# Patient Record
Sex: FEMALE | Race: WHITE | NOT HISPANIC OR LATINO | Employment: OTHER | URBAN - METROPOLITAN AREA
[De-identification: names, ages, dates, MRNs, and addresses within clinical notes are randomized per-mention and may not be internally consistent; named-entity substitution may affect disease eponyms.]

---

## 2017-01-03 ENCOUNTER — APPOINTMENT (OUTPATIENT)
Dept: RADIATION ONCOLOGY | Facility: CLINIC | Age: 67
End: 2017-01-03
Attending: RADIOLOGY
Payer: COMMERCIAL

## 2017-01-03 PROCEDURE — 77386 HB NTSTY MODUL RAD TX DLVR CPLX: CPT | Performed by: RADIOLOGY

## 2017-01-03 RX ORDER — SODIUM CHLORIDE 9 MG/ML
20 INJECTION, SOLUTION INTRAVENOUS CONTINUOUS
Status: DISCONTINUED | OUTPATIENT
Start: 2017-01-04 | End: 2017-01-07 | Stop reason: HOSPADM

## 2017-01-04 ENCOUNTER — HOSPITAL ENCOUNTER (OUTPATIENT)
Dept: INFUSION CENTER | Facility: CLINIC | Age: 67
Discharge: HOME | End: 2017-01-04
Payer: COMMERCIAL

## 2017-01-04 VITALS
HEART RATE: 84 BPM | HEIGHT: 66 IN | TEMPERATURE: 98.5 F | DIASTOLIC BLOOD PRESSURE: 69 MMHG | SYSTOLIC BLOOD PRESSURE: 146 MMHG | BODY MASS INDEX: 24.25 KG/M2 | RESPIRATION RATE: 20 BRPM | WEIGHT: 150.91 LBS | OXYGEN SATURATION: 99 %

## 2017-01-04 LAB
ALBUMIN SERPL BCP-MCNC: 3.8 G/DL (ref 3.5–5)
ALP SERPL-CCNC: 63 U/L (ref 46–116)
ALT SERPL W P-5'-P-CCNC: <6 U/L (ref 12–78)
ANION GAP SERPL CALCULATED.3IONS-SCNC: 8 MMOL/L (ref 4–13)
ANISOCYTOSIS BLD QL SMEAR: PRESENT
AST SERPL W P-5'-P-CCNC: <5 U/L (ref 5–45)
BASOPHILS # BLD AUTO: 0.05 THOUSAND/UL (ref 0–0.1)
BASOPHILS NFR MAR MANUAL: 1 % (ref 0–1)
BILIRUB SERPL-MCNC: 0.6 MG/DL (ref 0.2–1)
BUN SERPL-MCNC: 12 MG/DL (ref 5–25)
CALCIUM SERPL-MCNC: 9.9 MG/DL (ref 8.3–10.1)
CHLORIDE SERPL-SCNC: 106 MMOL/L (ref 100–108)
CO2 SERPL-SCNC: 25 MMOL/L (ref 21–32)
CREAT SERPL-MCNC: 1.17 MG/DL (ref 0.6–1.3)
DACRYOCYTES BLD QL SMEAR: PRESENT
EOSINOPHIL # BLD AUTO: 0.18 THOUSAND/UL (ref 0–0.61)
EOSINOPHIL NFR BLD MANUAL: 4 % (ref 0–6)
ERYTHROCYTE [DISTWIDTH] IN BLOOD BY AUTOMATED COUNT: 17.6 % (ref 11.6–15.1)
GFR SERPL CREATININE-BSD FRML MDRD: 46.6 ML/MIN/1.73SQ M
GLUCOSE SERPL-MCNC: 106 MG/DL (ref 65–140)
HCT VFR BLD AUTO: 30.4 % (ref 34.8–46.1)
HGB BLD-MCNC: 10 G/DL (ref 11.5–15.4)
LYMPHOCYTES # BLD AUTO: 0.32 THOUSAND/UL (ref 0.6–4.47)
LYMPHOCYTES # BLD AUTO: 7 % (ref 14–44)
MCH RBC QN AUTO: 26.9 PG (ref 26.8–34.3)
MCHC RBC AUTO-ENTMCNC: 33 G/DL (ref 31.4–37.4)
MCV RBC AUTO: 82 FL (ref 82–98)
MONOCYTES # BLD AUTO: 0.09 THOUSAND/UL (ref 0–1.22)
MONOCYTES NFR BLD AUTO: 2 % (ref 4–12)
NEUTS BAND NFR BLD MANUAL: 8 % (ref 0–8)
NEUTS SEG # BLD: 3.96 THOUSAND/UL (ref 1.81–6.82)
NEUTS SEG NFR BLD AUTO: 78 % (ref 43–75)
OVALOCYTES BLD QL SMEAR: PRESENT
PLATELET # BLD AUTO: 151 THOUSANDS/UL (ref 149–390)
PLATELET BLD QL SMEAR: ADEQUATE
PMV BLD AUTO: 6.6 FL (ref 8.9–12.7)
POTASSIUM SERPL-SCNC: 4.4 MMOL/L (ref 3.5–5.3)
PROT SERPL-MCNC: 7.8 G/DL (ref 6.4–8.2)
RBC # BLD AUTO: 3.72 MILLION/UL (ref 3.81–5.12)
SODIUM SERPL-SCNC: 139 MMOL/L (ref 136–145)
TOTAL CELLS COUNTED SPEC: 100
WBC # BLD AUTO: 4.6 THOUSAND/UL (ref 4.31–10.16)
WBC NRBC COR # BLD: 4.6 THOUSAND/UL (ref 4.31–10.16)

## 2017-01-04 PROCEDURE — 77336 RADIATION PHYSICS CONSULT: CPT | Performed by: RADIOLOGY

## 2017-01-04 PROCEDURE — 96361 HYDRATE IV INFUSION ADD-ON: CPT

## 2017-01-04 PROCEDURE — 77386 HB NTSTY MODUL RAD TX DLVR CPLX: CPT | Performed by: RADIOLOGY

## 2017-01-04 PROCEDURE — 96413 CHEMO IV INFUSION 1 HR: CPT

## 2017-01-04 PROCEDURE — 96375 TX/PRO/DX INJ NEW DRUG ADDON: CPT

## 2017-01-04 PROCEDURE — 80053 COMPREHEN METABOLIC PANEL: CPT | Performed by: INTERNAL MEDICINE

## 2017-01-04 PROCEDURE — 85007 BL SMEAR W/DIFF WBC COUNT: CPT | Performed by: INTERNAL MEDICINE

## 2017-01-04 PROCEDURE — 96367 TX/PROPH/DG ADDL SEQ IV INF: CPT

## 2017-01-04 PROCEDURE — 96417 CHEMO IV INFUS EACH ADDL SEQ: CPT

## 2017-01-04 PROCEDURE — 85027 COMPLETE CBC AUTOMATED: CPT | Performed by: INTERNAL MEDICINE

## 2017-01-04 RX ORDER — DIPHENHYDRAMINE HYDROCHLORIDE 50 MG/ML
25 INJECTION INTRAMUSCULAR; INTRAVENOUS ONCE
Status: COMPLETED | OUTPATIENT
Start: 2017-01-04 | End: 2017-01-04

## 2017-01-04 RX ORDER — DIPHENHYDRAMINE HYDROCHLORIDE 50 MG/ML
25 INJECTION INTRAMUSCULAR; INTRAVENOUS EVERY 6 HOURS PRN
Status: DISCONTINUED | OUTPATIENT
Start: 2017-01-04 | End: 2017-01-07 | Stop reason: HOSPADM

## 2017-01-04 RX ADMIN — ONDANSETRON HYDROCHLORIDE: 2 INJECTION, SOLUTION INTRAMUSCULAR; INTRAVENOUS at 09:12

## 2017-01-04 RX ADMIN — HYDROCORTISONE SODIUM SUCCINATE 50 MG: 100 INJECTION, POWDER, FOR SOLUTION INTRAMUSCULAR; INTRAVENOUS at 09:50

## 2017-01-04 RX ADMIN — DIPHENHYDRAMINE HYDROCHLORIDE 25 MG: 50 INJECTION, SOLUTION INTRAMUSCULAR; INTRAVENOUS at 09:48

## 2017-01-04 RX ADMIN — SODIUM CHLORIDE 500 ML: 0.9 INJECTION, SOLUTION INTRAVENOUS at 10:05

## 2017-01-04 RX ADMIN — DOCETAXEL 45 MG: 20 INJECTION, SOLUTION, CONCENTRATE INTRAVENOUS at 09:39

## 2017-01-04 RX ADMIN — CARBOPLATIN 117 MG: 10 INJECTION, SOLUTION INTRAVENOUS at 12:46

## 2017-01-04 RX ADMIN — RANITIDINE HYDROCHLORIDE 50 MG: 25 INJECTION, SOLUTION INTRAMUSCULAR; INTRAVENOUS at 09:55

## 2017-01-04 RX ADMIN — SODIUM CHLORIDE 20 ML/HR: 0.9 INJECTION, SOLUTION INTRAVENOUS at 09:12

## 2017-01-04 NOTE — PROGRESS NOTES
Eight minutes into the patient's taxotere infusion she  Began to cough  She c/o a tickle in her throat, chest tightness and sudden back pain  The chemotherapy reaction protocol was followed  Patient's symptoms resolved quickly  Within 15 minutes she denied back pain and chest pain  Dr Hayden Baumann was made aware  Orders were sent to re-challenge the taxotere

## 2017-01-04 NOTE — PLAN OF CARE
Problem: SAFETY ADULT  Goal: Patient will remain free of falls  INTERVENTIONS:  - Assess patient frequently for physical needs  - Identify cognitive and physical deficits and behaviors that affect risk of falls    - [place] fall precautions as indicated by assessment   - Educate patient/family on patient safety including physical limitations  - Instruct patient to call for assistance with activity based on assessment  - Modify environment to reduce risk of injury  - Consider OT/PT consult to assist with strengthening/mobility  Outcome: Progressing

## 2017-01-05 PROCEDURE — 77417 THER RADIOLOGY PORT IMAGE(S): CPT | Performed by: RADIOLOGY

## 2017-01-05 PROCEDURE — 77386 HB NTSTY MODUL RAD TX DLVR CPLX: CPT | Performed by: RADIOLOGY

## 2017-01-06 PROCEDURE — 77386 HB NTSTY MODUL RAD TX DLVR CPLX: CPT | Performed by: RADIOLOGY

## 2017-01-09 ENCOUNTER — APPOINTMENT (OUTPATIENT)
Dept: LAB | Facility: CLINIC | Age: 67
End: 2017-01-09
Payer: COMMERCIAL

## 2017-01-09 DIAGNOSIS — C34.90 MALIGNANT NEOPLASM OF UNSPECIFIED PART OF UNSPECIFIED BRONCHUS OR LUNG (HCC): ICD-10-CM

## 2017-01-09 LAB
ALBUMIN SERPL BCP-MCNC: 3.6 G/DL (ref 3.5–5)
ALP SERPL-CCNC: 61 U/L (ref 46–116)
ALT SERPL W P-5'-P-CCNC: <6 U/L (ref 12–78)
ANION GAP SERPL CALCULATED.3IONS-SCNC: 8 MMOL/L (ref 4–13)
ANISOCYTOSIS BLD QL SMEAR: PRESENT
AST SERPL W P-5'-P-CCNC: <5 U/L (ref 5–45)
BASOPHILS # BLD AUTO: 0 THOUSAND/UL (ref 0–0.1)
BASOPHILS NFR MAR MANUAL: 0 % (ref 0–1)
BILIRUB SERPL-MCNC: 0.5 MG/DL (ref 0.2–1)
BUN SERPL-MCNC: 13 MG/DL (ref 5–25)
CALCIUM SERPL-MCNC: 9.8 MG/DL (ref 8.3–10.1)
CHLORIDE SERPL-SCNC: 107 MMOL/L (ref 100–108)
CO2 SERPL-SCNC: 28 MMOL/L (ref 21–32)
CREAT SERPL-MCNC: 1.08 MG/DL (ref 0.6–1.3)
EOSINOPHIL # BLD AUTO: 0.18 THOUSAND/UL (ref 0–0.61)
EOSINOPHIL NFR BLD MANUAL: 3 % (ref 0–6)
ERYTHROCYTE [DISTWIDTH] IN BLOOD BY AUTOMATED COUNT: 18.4 % (ref 11.6–15.1)
GFR SERPL CREATININE-BSD FRML MDRD: 51.1 ML/MIN/1.73SQ M
GLUCOSE SERPL-MCNC: 115 MG/DL (ref 65–140)
HCT VFR BLD AUTO: 30.7 % (ref 34.8–46.1)
HGB BLD-MCNC: 10 G/DL (ref 11.5–15.4)
LG PLATELETS BLD QL SMEAR: PRESENT
LYMPHOCYTES # BLD AUTO: 0.41 THOUSAND/UL (ref 0.6–4.47)
LYMPHOCYTES # BLD AUTO: 7 % (ref 14–44)
MCH RBC QN AUTO: 26.7 PG (ref 26.8–34.3)
MCHC RBC AUTO-ENTMCNC: 32.6 G/DL (ref 31.4–37.4)
MCV RBC AUTO: 82 FL (ref 82–98)
MONOCYTES # BLD AUTO: 0.18 THOUSAND/UL (ref 0–1.22)
MONOCYTES NFR BLD AUTO: 3 % (ref 4–12)
NEUTS BAND NFR BLD MANUAL: 6 % (ref 0–8)
NEUTS SEG # BLD: 5.13 THOUSAND/UL (ref 1.81–6.82)
NEUTS SEG NFR BLD AUTO: 81 % (ref 43–75)
OVALOCYTES BLD QL SMEAR: PRESENT
PLATELET # BLD AUTO: 147 THOUSANDS/UL (ref 149–390)
PLATELET BLD QL SMEAR: ABNORMAL
PMV BLD AUTO: 6.5 FL (ref 8.9–12.7)
POTASSIUM SERPL-SCNC: 4.4 MMOL/L (ref 3.5–5.3)
PROT SERPL-MCNC: 7.5 G/DL (ref 6.4–8.2)
RBC # BLD AUTO: 3.74 MILLION/UL (ref 3.81–5.12)
SODIUM SERPL-SCNC: 143 MMOL/L (ref 136–145)
TOTAL CELLS COUNTED SPEC: 100
WBC # BLD AUTO: 5.9 THOUSAND/UL (ref 4.31–10.16)
WBC NRBC COR # BLD: 5.9 THOUSAND/UL (ref 4.31–10.16)

## 2017-01-09 PROCEDURE — 77386 HB NTSTY MODUL RAD TX DLVR CPLX: CPT | Performed by: RADIOLOGY

## 2017-01-09 PROCEDURE — 36415 COLL VENOUS BLD VENIPUNCTURE: CPT

## 2017-01-09 PROCEDURE — 85007 BL SMEAR W/DIFF WBC COUNT: CPT

## 2017-01-09 PROCEDURE — 80053 COMPREHEN METABOLIC PANEL: CPT

## 2017-01-09 PROCEDURE — 85027 COMPLETE CBC AUTOMATED: CPT

## 2017-01-10 ENCOUNTER — ALLSCRIPTS OFFICE VISIT (OUTPATIENT)
Dept: OTHER | Facility: OTHER | Age: 67
End: 2017-01-10

## 2017-01-10 PROCEDURE — 77386 HB NTSTY MODUL RAD TX DLVR CPLX: CPT | Performed by: RADIOLOGY

## 2017-01-10 RX ORDER — SODIUM CHLORIDE 9 MG/ML
20 INJECTION, SOLUTION INTRAVENOUS CONTINUOUS
Status: DISCONTINUED | OUTPATIENT
Start: 2017-01-11 | End: 2017-01-14 | Stop reason: HOSPADM

## 2017-01-11 ENCOUNTER — HOSPITAL ENCOUNTER (OUTPATIENT)
Dept: INFUSION CENTER | Facility: CLINIC | Age: 67
Discharge: HOME | End: 2017-01-11
Payer: COMMERCIAL

## 2017-01-11 VITALS
TEMPERATURE: 97.3 F | DIASTOLIC BLOOD PRESSURE: 61 MMHG | WEIGHT: 149.91 LBS | HEIGHT: 66 IN | HEART RATE: 87 BPM | BODY MASS INDEX: 24.09 KG/M2 | SYSTOLIC BLOOD PRESSURE: 126 MMHG | RESPIRATION RATE: 16 BRPM

## 2017-01-11 PROCEDURE — 96413 CHEMO IV INFUSION 1 HR: CPT

## 2017-01-11 PROCEDURE — 96367 TX/PROPH/DG ADDL SEQ IV INF: CPT

## 2017-01-11 PROCEDURE — 96375 TX/PRO/DX INJ NEW DRUG ADDON: CPT

## 2017-01-11 PROCEDURE — 96417 CHEMO IV INFUS EACH ADDL SEQ: CPT

## 2017-01-11 PROCEDURE — 77386 HB NTSTY MODUL RAD TX DLVR CPLX: CPT | Performed by: RADIOLOGY

## 2017-01-11 PROCEDURE — 77336 RADIATION PHYSICS CONSULT: CPT | Performed by: RADIOLOGY

## 2017-01-11 RX ADMIN — DIPHENHYDRAMINE HYDROCHLORIDE 25 MG: 50 INJECTION, SOLUTION INTRAMUSCULAR; INTRAVENOUS at 09:06

## 2017-01-11 RX ADMIN — CARBOPLATIN 117 MG: 10 INJECTION, SOLUTION INTRAVENOUS at 11:23

## 2017-01-11 RX ADMIN — HYDROCORTISONE SODIUM SUCCINATE 100 MG: 100 INJECTION, POWDER, FOR SOLUTION INTRAMUSCULAR; INTRAVENOUS at 09:03

## 2017-01-11 RX ADMIN — SODIUM CHLORIDE 20 ML/HR: 0.9 INJECTION, SOLUTION INTRAVENOUS at 08:45

## 2017-01-11 RX ADMIN — DOCETAXEL 45 MG: 20 INJECTION, SOLUTION, CONCENTRATE INTRAVENOUS at 09:53

## 2017-01-11 RX ADMIN — ONDANSETRON HYDROCHLORIDE: 2 INJECTION, SOLUTION INTRAMUSCULAR; INTRAVENOUS at 08:46

## 2017-01-11 NOTE — PLAN OF CARE
Problem: Knowledge Deficit  Goal: Patient/family/caregiver demonstrates understanding of disease process, treatment plan, medications, and discharge instructions  Complete learning assessment and assess knowledge base    Interventions:  - Provide teaching at level of understanding  - Provide teaching via preferred learning methods   Outcome: Progressing

## 2017-01-11 NOTE — PROGRESS NOTES
Pt tolerated Taxotere and Carboplatin infusions today without any adverse reaction  Taxotere was administered using titration guidelines for Taxane infusions

## 2017-01-12 PROCEDURE — 77417 THER RADIOLOGY PORT IMAGE(S): CPT | Performed by: RADIOLOGY

## 2017-01-12 PROCEDURE — 77386 HB NTSTY MODUL RAD TX DLVR CPLX: CPT | Performed by: RADIOLOGY

## 2017-01-13 PROCEDURE — 77386 HB NTSTY MODUL RAD TX DLVR CPLX: CPT | Performed by: RADIOLOGY

## 2017-01-16 ENCOUNTER — APPOINTMENT (OUTPATIENT)
Dept: LAB | Facility: CLINIC | Age: 67
End: 2017-01-16
Payer: COMMERCIAL

## 2017-01-16 DIAGNOSIS — C34.90 MALIGNANT NEOPLASM OF UNSPECIFIED PART OF UNSPECIFIED BRONCHUS OR LUNG (HCC): ICD-10-CM

## 2017-01-16 LAB
ALBUMIN SERPL BCP-MCNC: 3.8 G/DL (ref 3.5–5)
ALP SERPL-CCNC: 62 U/L (ref 46–116)
ALT SERPL W P-5'-P-CCNC: <6 U/L (ref 12–78)
ANION GAP SERPL CALCULATED.3IONS-SCNC: 9 MMOL/L (ref 4–13)
ANISOCYTOSIS BLD QL SMEAR: PRESENT
AST SERPL W P-5'-P-CCNC: <5 U/L (ref 5–45)
BASOPHILS # BLD AUTO: 0.04 THOUSAND/UL (ref 0–0.1)
BASOPHILS NFR MAR MANUAL: 1 % (ref 0–1)
BILIRUB SERPL-MCNC: 0.4 MG/DL (ref 0.2–1)
BUN SERPL-MCNC: 14 MG/DL (ref 5–25)
CALCIUM SERPL-MCNC: 10 MG/DL (ref 8.3–10.1)
CHLORIDE SERPL-SCNC: 105 MMOL/L (ref 100–108)
CO2 SERPL-SCNC: 26 MMOL/L (ref 21–32)
CREAT SERPL-MCNC: 1.14 MG/DL (ref 0.6–1.3)
EOSINOPHIL # BLD AUTO: 0.04 THOUSAND/UL (ref 0–0.61)
EOSINOPHIL NFR BLD MANUAL: 1 % (ref 0–6)
ERYTHROCYTE [DISTWIDTH] IN BLOOD BY AUTOMATED COUNT: 18.3 % (ref 11.6–15.1)
GFR SERPL CREATININE-BSD FRML MDRD: 48 ML/MIN/1.73SQ M
GLUCOSE SERPL-MCNC: 116 MG/DL (ref 65–140)
HCT VFR BLD AUTO: 30.1 % (ref 34.8–46.1)
HGB BLD-MCNC: 9.9 G/DL (ref 11.5–15.4)
LYMPHOCYTES # BLD AUTO: 0.24 THOUSAND/UL (ref 0.6–4.47)
LYMPHOCYTES # BLD AUTO: 6 % (ref 14–44)
MCH RBC QN AUTO: 27 PG (ref 26.8–34.3)
MCHC RBC AUTO-ENTMCNC: 32.9 G/DL (ref 31.4–37.4)
MCV RBC AUTO: 82 FL (ref 82–98)
MONOCYTES # BLD AUTO: 0.16 THOUSAND/UL (ref 0–1.22)
MONOCYTES NFR BLD AUTO: 4 % (ref 4–12)
NEUTS BAND NFR BLD MANUAL: 2 % (ref 0–8)
NEUTS SEG # BLD: 3.52 THOUSAND/UL (ref 1.81–6.82)
NEUTS SEG NFR BLD AUTO: 86 % (ref 43–75)
OVALOCYTES BLD QL SMEAR: PRESENT
PLATELET # BLD AUTO: 109 THOUSANDS/UL (ref 149–390)
PLATELET BLD QL SMEAR: ABNORMAL
PMV BLD AUTO: 6.9 FL (ref 8.9–12.7)
POTASSIUM SERPL-SCNC: 4.7 MMOL/L (ref 3.5–5.3)
PROT SERPL-MCNC: 7.5 G/DL (ref 6.4–8.2)
RBC # BLD AUTO: 3.67 MILLION/UL (ref 3.81–5.12)
SODIUM SERPL-SCNC: 140 MMOL/L (ref 136–145)
TOTAL CELLS COUNTED SPEC: 100
WBC # BLD AUTO: 4 THOUSAND/UL (ref 4.31–10.16)
WBC NRBC COR # BLD: 4 THOUSAND/UL (ref 4.31–10.16)

## 2017-01-16 PROCEDURE — 85007 BL SMEAR W/DIFF WBC COUNT: CPT

## 2017-01-16 PROCEDURE — 80053 COMPREHEN METABOLIC PANEL: CPT

## 2017-01-16 PROCEDURE — 36415 COLL VENOUS BLD VENIPUNCTURE: CPT

## 2017-01-16 PROCEDURE — 85027 COMPLETE CBC AUTOMATED: CPT

## 2017-01-16 PROCEDURE — 77386 HB NTSTY MODUL RAD TX DLVR CPLX: CPT | Performed by: RADIOLOGY

## 2017-01-17 PROCEDURE — 77386 HB NTSTY MODUL RAD TX DLVR CPLX: CPT | Performed by: RADIOLOGY

## 2017-01-17 PROCEDURE — 77417 THER RADIOLOGY PORT IMAGE(S): CPT | Performed by: RADIOLOGY

## 2017-01-17 RX ORDER — SODIUM CHLORIDE 9 MG/ML
20 INJECTION, SOLUTION INTRAVENOUS CONTINUOUS
Status: DISCONTINUED | OUTPATIENT
Start: 2017-01-18 | End: 2017-01-21 | Stop reason: HOSPADM

## 2017-01-17 RX ORDER — SODIUM CHLORIDE 9 MG/ML
20 INJECTION, SOLUTION INTRAVENOUS CONTINUOUS
Status: DISCONTINUED | OUTPATIENT
Start: 2017-01-18 | End: 2017-01-17 | Stop reason: SDUPTHER

## 2017-01-18 ENCOUNTER — HOSPITAL ENCOUNTER (OUTPATIENT)
Dept: INFUSION CENTER | Facility: CLINIC | Age: 67
Discharge: HOME | End: 2017-01-18
Payer: COMMERCIAL

## 2017-01-18 VITALS
HEIGHT: 67 IN | BODY MASS INDEX: 23.53 KG/M2 | TEMPERATURE: 96.3 F | DIASTOLIC BLOOD PRESSURE: 67 MMHG | WEIGHT: 149.91 LBS | RESPIRATION RATE: 16 BRPM | SYSTOLIC BLOOD PRESSURE: 127 MMHG | HEART RATE: 106 BPM

## 2017-01-18 PROCEDURE — 96413 CHEMO IV INFUSION 1 HR: CPT

## 2017-01-18 PROCEDURE — 77336 RADIATION PHYSICS CONSULT: CPT | Performed by: RADIOLOGY

## 2017-01-18 PROCEDURE — 96367 TX/PROPH/DG ADDL SEQ IV INF: CPT

## 2017-01-18 PROCEDURE — 96417 CHEMO IV INFUS EACH ADDL SEQ: CPT

## 2017-01-18 PROCEDURE — 96375 TX/PRO/DX INJ NEW DRUG ADDON: CPT

## 2017-01-18 PROCEDURE — 77386 HB NTSTY MODUL RAD TX DLVR CPLX: CPT | Performed by: RADIOLOGY

## 2017-01-18 RX ADMIN — DIPHENHYDRAMINE HYDROCHLORIDE 25 MG: 50 INJECTION, SOLUTION INTRAMUSCULAR; INTRAVENOUS at 08:35

## 2017-01-18 RX ADMIN — DOCETAXEL 45 MG: 20 INJECTION, SOLUTION, CONCENTRATE INTRAVENOUS at 09:54

## 2017-01-18 RX ADMIN — SODIUM CHLORIDE 20 ML/HR: 0.9 INJECTION, SOLUTION INTRAVENOUS at 08:35

## 2017-01-18 RX ADMIN — ONDANSETRON HYDROCHLORIDE: 2 INJECTION, SOLUTION INTRAMUSCULAR; INTRAVENOUS at 08:57

## 2017-01-18 RX ADMIN — CARBOPLATIN 119 MG: 10 INJECTION, SOLUTION INTRAVENOUS at 11:18

## 2017-01-18 RX ADMIN — HYDROCORTISONE SODIUM SUCCINATE 100 MG: 100 INJECTION, POWDER, FOR SOLUTION INTRAMUSCULAR; INTRAVENOUS at 09:20

## 2017-01-18 NOTE — PLAN OF CARE
Problem: Potential for Falls  Goal: Patient will remain free of falls  INTERVENTIONS:  - Assess patient frequently for physical needs  - Identify cognitive and physical deficits and behaviors that affect risk of falls    - [place] fall precautions as indicated by assessment   - Educate patient/family on patient safety including physical limitations  - Instruct patient to call for assistance with activity based on assessment  - Modify environment to reduce risk of injury  - Consider OT/PT consult to assist with strengthening/mobility   Outcome: Progressing

## 2017-01-19 PROCEDURE — 77386 HB NTSTY MODUL RAD TX DLVR CPLX: CPT | Performed by: RADIOLOGY

## 2017-01-20 PROCEDURE — 77386 HB NTSTY MODUL RAD TX DLVR CPLX: CPT | Performed by: RADIOLOGY

## 2017-01-23 ENCOUNTER — TRANSCRIBE ORDERS (OUTPATIENT)
Dept: LAB | Facility: CLINIC | Age: 67
End: 2017-01-23

## 2017-01-23 ENCOUNTER — APPOINTMENT (OUTPATIENT)
Dept: LAB | Facility: CLINIC | Age: 67
End: 2017-01-23
Payer: COMMERCIAL

## 2017-01-23 DIAGNOSIS — C34.90 MALIGNANT NEOPLASM OF UNSPECIFIED PART OF UNSPECIFIED BRONCHUS OR LUNG (HCC): ICD-10-CM

## 2017-01-23 LAB
ALBUMIN SERPL BCP-MCNC: 3.1 G/DL (ref 3.5–5)
ALP SERPL-CCNC: 54 U/L (ref 46–116)
ALT SERPL W P-5'-P-CCNC: 14 U/L (ref 12–78)
ANION GAP SERPL CALCULATED.3IONS-SCNC: 10 MMOL/L (ref 4–13)
ANISOCYTOSIS BLD QL SMEAR: PRESENT
AST SERPL W P-5'-P-CCNC: 13 U/L (ref 5–45)
BASOPHILS # BLD AUTO: 0 THOUSAND/UL (ref 0–0.1)
BASOPHILS NFR MAR MANUAL: 0 % (ref 0–1)
BILIRUB SERPL-MCNC: 0.32 MG/DL (ref 0.2–1)
BUN SERPL-MCNC: 14 MG/DL (ref 5–25)
CALCIUM SERPL-MCNC: 9.2 MG/DL (ref 8.3–10.1)
CHLORIDE SERPL-SCNC: 106 MMOL/L (ref 100–108)
CO2 SERPL-SCNC: 26 MMOL/L (ref 21–32)
CREAT SERPL-MCNC: 1.11 MG/DL (ref 0.6–1.3)
EOSINOPHIL # BLD AUTO: 0 THOUSAND/UL (ref 0–0.61)
EOSINOPHIL NFR BLD MANUAL: 0 % (ref 0–6)
ERYTHROCYTE [DISTWIDTH] IN BLOOD BY AUTOMATED COUNT: 19.4 % (ref 11.6–15.1)
GFR SERPL CREATININE-BSD FRML MDRD: 49.5 ML/MIN/1.73SQ M
GLUCOSE SERPL-MCNC: 111 MG/DL (ref 65–140)
HCT VFR BLD AUTO: 26.8 % (ref 34.8–46.1)
HGB BLD-MCNC: 8.7 G/DL (ref 11.5–15.4)
LG PLATELETS BLD QL SMEAR: PRESENT
LYMPHOCYTES # BLD AUTO: 0.17 THOUSAND/UL (ref 0.6–4.47)
LYMPHOCYTES # BLD AUTO: 7 % (ref 14–44)
MCH RBC QN AUTO: 26.8 PG (ref 26.8–34.3)
MCHC RBC AUTO-ENTMCNC: 32.5 G/DL (ref 31.4–37.4)
MCV RBC AUTO: 83 FL (ref 82–98)
MONOCYTES # BLD AUTO: 0.02 THOUSAND/UL (ref 0–1.22)
MONOCYTES NFR BLD AUTO: 1 % (ref 4–12)
NEUTS BAND NFR BLD MANUAL: 3 % (ref 0–8)
NEUTS SEG # BLD: 2.21 THOUSAND/UL (ref 1.81–6.82)
NEUTS SEG NFR BLD AUTO: 89 % (ref 43–75)
OVALOCYTES BLD QL SMEAR: PRESENT
PLATELET # BLD AUTO: 85 THOUSANDS/UL (ref 149–390)
PMV BLD AUTO: 6.8 FL (ref 8.9–12.7)
POTASSIUM SERPL-SCNC: 4.7 MMOL/L (ref 3.5–5.3)
PROT SERPL-MCNC: 6.9 G/DL (ref 6.4–8.2)
RBC # BLD AUTO: 3.24 MILLION/UL (ref 3.81–5.12)
SODIUM SERPL-SCNC: 142 MMOL/L (ref 136–145)
TOTAL CELLS COUNTED SPEC: 100
WBC # BLD AUTO: 2.4 THOUSAND/UL (ref 4.31–10.16)
WBC NRBC COR # BLD: 2.4 THOUSAND/UL (ref 4.31–10.16)

## 2017-01-23 PROCEDURE — 80053 COMPREHEN METABOLIC PANEL: CPT

## 2017-01-23 PROCEDURE — 85007 BL SMEAR W/DIFF WBC COUNT: CPT

## 2017-01-23 PROCEDURE — 85027 COMPLETE CBC AUTOMATED: CPT

## 2017-01-23 PROCEDURE — 77386 HB NTSTY MODUL RAD TX DLVR CPLX: CPT | Performed by: RADIOLOGY

## 2017-01-23 PROCEDURE — 36415 COLL VENOUS BLD VENIPUNCTURE: CPT

## 2017-01-24 ENCOUNTER — ALLSCRIPTS OFFICE VISIT (OUTPATIENT)
Dept: OTHER | Facility: OTHER | Age: 67
End: 2017-01-24

## 2017-01-24 PROCEDURE — 77417 THER RADIOLOGY PORT IMAGE(S): CPT | Performed by: RADIOLOGY

## 2017-01-24 PROCEDURE — 77386 HB NTSTY MODUL RAD TX DLVR CPLX: CPT | Performed by: RADIOLOGY

## 2017-01-24 RX ORDER — SODIUM CHLORIDE 9 MG/ML
20 INJECTION, SOLUTION INTRAVENOUS CONTINUOUS
Status: DISCONTINUED | OUTPATIENT
Start: 2017-01-25 | End: 2017-01-24 | Stop reason: SDUPTHER

## 2017-01-24 RX ORDER — SODIUM CHLORIDE 9 MG/ML
20 INJECTION, SOLUTION INTRAVENOUS CONTINUOUS
Status: DISCONTINUED | OUTPATIENT
Start: 2017-01-25 | End: 2017-01-28 | Stop reason: HOSPADM

## 2017-01-25 ENCOUNTER — HOSPITAL ENCOUNTER (OUTPATIENT)
Dept: INFUSION CENTER | Facility: CLINIC | Age: 67
Discharge: HOME | End: 2017-01-25
Payer: COMMERCIAL

## 2017-01-25 VITALS
RESPIRATION RATE: 16 BRPM | BODY MASS INDEX: 24.15 KG/M2 | TEMPERATURE: 98.2 F | WEIGHT: 150.24 LBS | DIASTOLIC BLOOD PRESSURE: 60 MMHG | HEART RATE: 83 BPM | SYSTOLIC BLOOD PRESSURE: 125 MMHG | HEIGHT: 66 IN

## 2017-01-25 PROCEDURE — 96367 TX/PROPH/DG ADDL SEQ IV INF: CPT

## 2017-01-25 PROCEDURE — 96417 CHEMO IV INFUS EACH ADDL SEQ: CPT

## 2017-01-25 PROCEDURE — 96413 CHEMO IV INFUSION 1 HR: CPT

## 2017-01-25 PROCEDURE — 77386 HB NTSTY MODUL RAD TX DLVR CPLX: CPT | Performed by: RADIOLOGY

## 2017-01-25 PROCEDURE — 96375 TX/PRO/DX INJ NEW DRUG ADDON: CPT

## 2017-01-25 PROCEDURE — 77336 RADIATION PHYSICS CONSULT: CPT | Performed by: RADIOLOGY

## 2017-01-25 RX ADMIN — SODIUM CHLORIDE 20 ML/HR: 0.9 INJECTION, SOLUTION INTRAVENOUS at 09:00

## 2017-01-25 RX ADMIN — DEXAMETHASONE SODIUM PHOSPHATE: 10 INJECTION, SOLUTION INTRAMUSCULAR; INTRAVENOUS at 09:15

## 2017-01-25 RX ADMIN — HYDROCORTISONE SODIUM SUCCINATE 100 MG: 100 INJECTION, POWDER, FOR SOLUTION INTRAMUSCULAR; INTRAVENOUS at 09:55

## 2017-01-25 RX ADMIN — DOCETAXEL 44 MG: 20 INJECTION, SOLUTION, CONCENTRATE INTRAVENOUS at 10:30

## 2017-01-25 RX ADMIN — DIPHENHYDRAMINE HYDROCHLORIDE 25 MG: 50 INJECTION, SOLUTION INTRAMUSCULAR; INTRAVENOUS at 09:39

## 2017-01-25 RX ADMIN — CARBOPLATIN 119 MG: 10 INJECTION, SOLUTION INTRAVENOUS at 11:39

## 2017-01-25 NOTE — PROGRESS NOTES
Pt tolerated both Taxotere and Carboplatin without adverse event  Appointments reviewed  AVS provided

## 2017-01-25 NOTE — PLAN OF CARE
Problem: PAIN - ADULT  Goal: Verbalizes/displays adequate comfort level or baseline comfort level  Interventions:  - Encourage patient to monitor pain and request assistance  - Assess pain using appropriate pain scale  - Administer analgesics based on type and severity of pain and evaluate response  - Implement non-pharmacological measures as appropriate and evaluate response  - Consider cultural and social influences on pain and pain management  - Notify physician/advanced practitioner if interventions unsuccessful or patient reports new pain  Outcome: Progressing    Problem: INFECTION - ADULT  Goal: Absence or prevention of progression during hospitalization  INTERVENTIONS:  - Assess and monitor for signs and symptoms of infection  - Monitor lab/diagnostic results  - Monitor all insertion sites, i e  indwelling lines, tubes, and drains  - Monitor endotracheal (as able) and nasal secretions for changes in amount and color  - Hood appropriate cooling/warming therapies per order  - Administer medications as ordered  - Instruct and encourage patient and family to use good hand hygiene technique  - Identify and instruct in appropriate isolation precautions for identified infection/condition  Outcome: Progressing  Goal: Absence of fever/infection during neutropenic period  INTERVENTIONS:  - Monitor WBC  - Implement neutropenic guidelines  Outcome: Progressing    Problem: Knowledge Deficit  Goal: Patient/family/caregiver demonstrates understanding of disease process, treatment plan, medications, and discharge instructions  Complete learning assessment and assess knowledge base    Interventions:  - Provide teaching at level of understanding  - Provide teaching via preferred learning methods  Outcome: Progressing

## 2017-01-25 NOTE — PROGRESS NOTES
Labs reviewed and order on chart, "Ok for Chemotherapy with Platelets of 50,213 per Dr Cehry Stauffer  Pt  With laryngitis; both Oncologist and Radiation Oncologist aware

## 2017-01-26 PROCEDURE — 77386 HB NTSTY MODUL RAD TX DLVR CPLX: CPT | Performed by: RADIOLOGY

## 2017-01-27 PROCEDURE — 77386 HB NTSTY MODUL RAD TX DLVR CPLX: CPT | Performed by: RADIOLOGY

## 2017-01-30 ENCOUNTER — APPOINTMENT (OUTPATIENT)
Dept: LAB | Facility: CLINIC | Age: 67
End: 2017-01-30
Payer: COMMERCIAL

## 2017-01-30 DIAGNOSIS — C34.90 MALIGNANT NEOPLASM OF UNSPECIFIED PART OF UNSPECIFIED BRONCHUS OR LUNG (HCC): ICD-10-CM

## 2017-01-30 LAB
ALBUMIN SERPL BCP-MCNC: 3.5 G/DL (ref 3.5–5)
ALP SERPL-CCNC: 41 U/L (ref 46–116)
ALT SERPL W P-5'-P-CCNC: <6 U/L (ref 12–78)
ANION GAP SERPL CALCULATED.3IONS-SCNC: 10 MMOL/L (ref 4–13)
ANISOCYTOSIS BLD QL SMEAR: PRESENT
AST SERPL W P-5'-P-CCNC: <5 U/L (ref 5–45)
BASOPHILS # BLD AUTO: 0 THOUSAND/UL (ref 0–0.1)
BASOPHILS NFR MAR MANUAL: 0 % (ref 0–1)
BILIRUB SERPL-MCNC: 0.6 MG/DL (ref 0.2–1)
BUN SERPL-MCNC: 12 MG/DL (ref 5–25)
CALCIUM SERPL-MCNC: 9.1 MG/DL (ref 8.3–10.1)
CHLORIDE SERPL-SCNC: 108 MMOL/L (ref 100–108)
CO2 SERPL-SCNC: 23 MMOL/L (ref 21–32)
CREAT SERPL-MCNC: 1 MG/DL (ref 0.6–1.3)
EOSINOPHIL # BLD AUTO: 0 THOUSAND/UL (ref 0–0.61)
EOSINOPHIL NFR BLD MANUAL: 0 % (ref 0–6)
ERYTHROCYTE [DISTWIDTH] IN BLOOD BY AUTOMATED COUNT: 21.1 % (ref 11.6–15.1)
GFR SERPL CREATININE-BSD FRML MDRD: 55.8 ML/MIN/1.73SQ M
GLUCOSE SERPL-MCNC: 105 MG/DL (ref 65–140)
HCT VFR BLD AUTO: 24.5 % (ref 34.8–46.1)
HGB BLD-MCNC: 7.9 G/DL (ref 11.5–15.4)
LYMPHOCYTES # BLD AUTO: 0.16 THOUSAND/UL (ref 0.6–4.47)
LYMPHOCYTES # BLD AUTO: 10 % (ref 14–44)
MCH RBC QN AUTO: 26.7 PG (ref 26.8–34.3)
MCHC RBC AUTO-ENTMCNC: 32.2 G/DL (ref 31.4–37.4)
MCV RBC AUTO: 83 FL (ref 82–98)
MONOCYTES # BLD AUTO: 0.06 THOUSAND/UL (ref 0–1.22)
MONOCYTES NFR BLD AUTO: 4 % (ref 4–12)
NEUTS BAND NFR BLD MANUAL: 0 % (ref 0–8)
NEUTS SEG # BLD: 1.38 THOUSAND/UL (ref 1.81–6.82)
NEUTS SEG NFR BLD AUTO: 86 % (ref 43–75)
OVALOCYTES BLD QL SMEAR: PRESENT
PLATELET # BLD AUTO: 55 THOUSANDS/UL (ref 149–390)
PLATELET BLD QL SMEAR: ABNORMAL
PMV BLD AUTO: 7.3 FL (ref 8.9–12.7)
POTASSIUM SERPL-SCNC: 4.3 MMOL/L (ref 3.5–5.3)
PROT SERPL-MCNC: 6.4 G/DL (ref 6.4–8.2)
RBC # BLD AUTO: 2.95 MILLION/UL (ref 3.81–5.12)
SODIUM SERPL-SCNC: 141 MMOL/L (ref 136–145)
TOTAL CELLS COUNTED SPEC: 100
WBC # BLD AUTO: 1.6 THOUSAND/UL (ref 4.31–10.16)
WBC NRBC COR # BLD: 1.6 THOUSAND/UL (ref 4.31–10.16)

## 2017-01-30 PROCEDURE — 36415 COLL VENOUS BLD VENIPUNCTURE: CPT

## 2017-01-30 PROCEDURE — 80053 COMPREHEN METABOLIC PANEL: CPT

## 2017-01-30 PROCEDURE — 85007 BL SMEAR W/DIFF WBC COUNT: CPT

## 2017-01-30 PROCEDURE — 77386 HB NTSTY MODUL RAD TX DLVR CPLX: CPT | Performed by: RADIOLOGY

## 2017-01-30 PROCEDURE — 85027 COMPLETE CBC AUTOMATED: CPT

## 2017-01-31 DIAGNOSIS — C34.90 MALIGNANT NEOPLASM OF UNSPECIFIED PART OF UNSPECIFIED BRONCHUS OR LUNG (HCC): ICD-10-CM

## 2017-01-31 DIAGNOSIS — C34.32 MALIGNANT NEOPLASM OF LOWER LOBE, LEFT BRONCHUS OR LUNG (HCC): ICD-10-CM

## 2017-01-31 RX ORDER — SODIUM CHLORIDE 9 MG/ML
20 INJECTION, SOLUTION INTRAVENOUS CONTINUOUS
Status: DISCONTINUED | OUTPATIENT
Start: 2017-02-01 | End: 2017-02-04 | Stop reason: HOSPADM

## 2017-02-01 ENCOUNTER — APPOINTMENT (OUTPATIENT)
Dept: RADIATION ONCOLOGY | Facility: CLINIC | Age: 67
End: 2017-02-01
Attending: RADIOLOGY
Payer: COMMERCIAL

## 2017-02-01 ENCOUNTER — HOSPITAL ENCOUNTER (OUTPATIENT)
Dept: INFUSION CENTER | Facility: CLINIC | Age: 67
Discharge: HOME | End: 2017-02-01
Payer: COMMERCIAL

## 2017-02-01 VITALS
TEMPERATURE: 98.2 F | WEIGHT: 149.91 LBS | HEIGHT: 66 IN | HEART RATE: 102 BPM | SYSTOLIC BLOOD PRESSURE: 139 MMHG | BODY MASS INDEX: 24.09 KG/M2 | DIASTOLIC BLOOD PRESSURE: 79 MMHG | RESPIRATION RATE: 18 BRPM

## 2017-02-01 PROCEDURE — 96375 TX/PRO/DX INJ NEW DRUG ADDON: CPT

## 2017-02-01 PROCEDURE — 96413 CHEMO IV INFUSION 1 HR: CPT

## 2017-02-01 PROCEDURE — 96417 CHEMO IV INFUS EACH ADDL SEQ: CPT

## 2017-02-01 PROCEDURE — 96367 TX/PROPH/DG ADDL SEQ IV INF: CPT

## 2017-02-01 RX ADMIN — HYDROCORTISONE SODIUM SUCCINATE 100 MG: 100 INJECTION, POWDER, FOR SOLUTION INTRAMUSCULAR; INTRAVENOUS at 09:44

## 2017-02-01 RX ADMIN — CARBOPLATIN 87 MG: 10 INJECTION, SOLUTION INTRAVENOUS at 11:24

## 2017-02-01 RX ADMIN — DEXAMETHASONE SODIUM PHOSPHATE: 10 INJECTION, SOLUTION INTRAMUSCULAR; INTRAVENOUS at 08:57

## 2017-02-01 RX ADMIN — DIPHENHYDRAMINE HYDROCHLORIDE 25 MG: 50 INJECTION, SOLUTION INTRAMUSCULAR; INTRAVENOUS at 09:24

## 2017-02-01 RX ADMIN — SODIUM CHLORIDE 20 ML/HR: 0.9 INJECTION, SOLUTION INTRAVENOUS at 08:46

## 2017-02-01 RX ADMIN — DOCETAXEL 44 MG: 20 INJECTION, SOLUTION, CONCENTRATE INTRAVENOUS at 10:11

## 2017-02-02 ENCOUNTER — TRANSCRIBE ORDERS (OUTPATIENT)
Dept: LAB | Facility: CLINIC | Age: 67
End: 2017-02-02

## 2017-02-02 ENCOUNTER — APPOINTMENT (OUTPATIENT)
Dept: LAB | Facility: CLINIC | Age: 67
End: 2017-02-02
Payer: COMMERCIAL

## 2017-02-02 DIAGNOSIS — C34.90 MALIGNANT NEOPLASM OF LUNG, UNSPECIFIED LATERALITY, UNSPECIFIED PART OF LUNG (HCC): ICD-10-CM

## 2017-02-02 DIAGNOSIS — C34.90 MALIGNANT NEOPLASM OF LUNG, UNSPECIFIED LATERALITY, UNSPECIFIED PART OF LUNG (HCC): Primary | ICD-10-CM

## 2017-02-02 LAB
ALBUMIN SERPL BCP-MCNC: 3.5 G/DL (ref 3.5–5)
ALP SERPL-CCNC: 46 U/L (ref 46–116)
ALT SERPL W P-5'-P-CCNC: <6 U/L (ref 12–78)
ANION GAP SERPL CALCULATED.3IONS-SCNC: 5 MMOL/L (ref 4–13)
ANISOCYTOSIS BLD QL SMEAR: PRESENT
AST SERPL W P-5'-P-CCNC: <5 U/L (ref 5–45)
BASOPHILS # BLD AUTO: 0.01 THOUSAND/UL (ref 0–0.1)
BASOPHILS NFR MAR MANUAL: 1 % (ref 0–1)
BILIRUB SERPL-MCNC: 0.5 MG/DL (ref 0.2–1)
BUN SERPL-MCNC: 8 MG/DL (ref 5–25)
CALCIUM SERPL-MCNC: 8.5 MG/DL (ref 8.3–10.1)
CHLORIDE SERPL-SCNC: 110 MMOL/L (ref 100–108)
CO2 SERPL-SCNC: 27 MMOL/L (ref 21–32)
CREAT SERPL-MCNC: 0.98 MG/DL (ref 0.6–1.3)
EOSINOPHIL # BLD AUTO: 0.01 THOUSAND/UL (ref 0–0.61)
EOSINOPHIL NFR BLD MANUAL: 1 % (ref 0–6)
ERYTHROCYTE [DISTWIDTH] IN BLOOD BY AUTOMATED COUNT: 21.5 % (ref 11.6–15.1)
GFR SERPL CREATININE-BSD FRML MDRD: 57.1 ML/MIN/1.73SQ M
GLUCOSE SERPL-MCNC: 96 MG/DL (ref 65–140)
HCT VFR BLD AUTO: 22.4 % (ref 34.8–46.1)
HGB BLD-MCNC: 7.4 G/DL (ref 11.5–15.4)
LYMPHOCYTES # BLD AUTO: 0.07 THOUSAND/UL (ref 0.6–4.47)
LYMPHOCYTES # BLD AUTO: 5 % (ref 14–44)
MCH RBC QN AUTO: 27.8 PG (ref 26.8–34.3)
MCHC RBC AUTO-ENTMCNC: 33.2 G/DL (ref 31.4–37.4)
MCV RBC AUTO: 84 FL (ref 82–98)
MONOCYTES # BLD AUTO: 0 THOUSAND/UL (ref 0–1.22)
MONOCYTES NFR BLD AUTO: 0 % (ref 4–12)
NEUTS BAND NFR BLD MANUAL: 10 % (ref 0–8)
NEUTS SEG # BLD: 1.3 THOUSAND/UL (ref 1.81–6.82)
NEUTS SEG NFR BLD AUTO: 83 % (ref 43–75)
OVALOCYTES BLD QL SMEAR: PRESENT
PLATELET # BLD AUTO: 46 THOUSANDS/UL (ref 149–390)
PMV BLD AUTO: 6.9 FL (ref 8.9–12.7)
POTASSIUM SERPL-SCNC: 3.9 MMOL/L (ref 3.5–5.3)
PROT SERPL-MCNC: 6.3 G/DL (ref 6.4–8.2)
RBC # BLD AUTO: 2.68 MILLION/UL (ref 3.81–5.12)
SODIUM SERPL-SCNC: 142 MMOL/L (ref 136–145)
TOTAL CELLS COUNTED SPEC: 100
WBC # BLD AUTO: 1.4 THOUSAND/UL (ref 4.31–10.16)
WBC NRBC COR # BLD: 1.4 THOUSAND/UL (ref 4.31–10.16)

## 2017-02-02 PROCEDURE — 85007 BL SMEAR W/DIFF WBC COUNT: CPT

## 2017-02-02 PROCEDURE — 80053 COMPREHEN METABOLIC PANEL: CPT

## 2017-02-02 PROCEDURE — 85027 COMPLETE CBC AUTOMATED: CPT

## 2017-02-02 PROCEDURE — 36415 COLL VENOUS BLD VENIPUNCTURE: CPT

## 2017-02-06 ENCOUNTER — TRANSCRIBE ORDERS (OUTPATIENT)
Dept: RADIATION ONCOLOGY | Facility: CLINIC | Age: 67
End: 2017-02-06

## 2017-02-06 ENCOUNTER — APPOINTMENT (OUTPATIENT)
Dept: LAB | Facility: CLINIC | Age: 67
End: 2017-02-06
Payer: COMMERCIAL

## 2017-02-06 DIAGNOSIS — C34.90 MALIGNANT NEOPLASM OF UNSPECIFIED PART OF UNSPECIFIED BRONCHUS OR LUNG (HCC): ICD-10-CM

## 2017-02-06 LAB
ALBUMIN SERPL BCP-MCNC: 3.5 G/DL (ref 3.5–5)
ALP SERPL-CCNC: 50 U/L (ref 46–116)
ALT SERPL W P-5'-P-CCNC: <6 U/L (ref 12–78)
ANION GAP SERPL CALCULATED.3IONS-SCNC: 8 MMOL/L (ref 4–13)
ANISOCYTOSIS BLD QL SMEAR: PRESENT
AST SERPL W P-5'-P-CCNC: <5 U/L (ref 5–45)
BASOPHILS # BLD AUTO: 0 THOUSAND/UL (ref 0–0.1)
BASOPHILS NFR MAR MANUAL: 0 % (ref 0–1)
BILIRUB SERPL-MCNC: 0.9 MG/DL (ref 0.2–1)
BUN SERPL-MCNC: 9 MG/DL (ref 5–25)
CALCIUM SERPL-MCNC: 9.1 MG/DL (ref 8.3–10.1)
CHLORIDE SERPL-SCNC: 107 MMOL/L (ref 100–108)
CO2 SERPL-SCNC: 24 MMOL/L (ref 21–32)
CREAT SERPL-MCNC: 1.02 MG/DL (ref 0.6–1.3)
EOSINOPHIL # BLD AUTO: 0.02 THOUSAND/UL (ref 0–0.61)
EOSINOPHIL NFR BLD MANUAL: 1 % (ref 0–6)
ERYTHROCYTE [DISTWIDTH] IN BLOOD BY AUTOMATED COUNT: 22 % (ref 11.6–15.1)
GFR SERPL CREATININE-BSD FRML MDRD: 54.6 ML/MIN/1.73SQ M
GLUCOSE SERPL-MCNC: 94 MG/DL (ref 65–140)
HCT VFR BLD AUTO: 26.6 % (ref 34.8–46.1)
HGB BLD-MCNC: 8.8 G/DL (ref 11.5–15.4)
LG PLATELETS BLD QL SMEAR: PRESENT
LYMPHOCYTES # BLD AUTO: 0.23 THOUSAND/UL (ref 0.6–4.47)
LYMPHOCYTES # BLD AUTO: 10 % (ref 14–44)
MCH RBC QN AUTO: 27.6 PG (ref 26.8–34.3)
MCHC RBC AUTO-ENTMCNC: 33 G/DL (ref 31.4–37.4)
MCV RBC AUTO: 84 FL (ref 82–98)
METAMYELOCYTES NFR BLD MANUAL: 1 % (ref 0–1)
MONOCYTES # BLD AUTO: 0.05 THOUSAND/UL (ref 0–1.22)
MONOCYTES NFR BLD AUTO: 2 % (ref 4–12)
NEUTS BAND NFR BLD MANUAL: 6 % (ref 0–8)
NEUTS SEG # BLD: 1.98 THOUSAND/UL (ref 1.81–6.82)
NEUTS SEG NFR BLD AUTO: 80 % (ref 43–75)
OVALOCYTES BLD QL SMEAR: PRESENT
PLATELET # BLD AUTO: 64 THOUSANDS/UL (ref 149–390)
PLATELET BLD QL SMEAR: ABNORMAL
PMV BLD AUTO: 6.9 FL (ref 8.9–12.7)
POIKILOCYTOSIS BLD QL SMEAR: PRESENT
POTASSIUM SERPL-SCNC: 4 MMOL/L (ref 3.5–5.3)
PROT SERPL-MCNC: 6.7 G/DL (ref 6.4–8.2)
RBC # BLD AUTO: 3.19 MILLION/UL (ref 3.81–5.12)
SODIUM SERPL-SCNC: 139 MMOL/L (ref 136–145)
TOTAL CELLS COUNTED SPEC: 100
WBC # BLD AUTO: 2.3 THOUSAND/UL (ref 4.31–10.16)
WBC NRBC COR # BLD: 2.3 THOUSAND/UL (ref 4.31–10.16)

## 2017-02-06 PROCEDURE — 80053 COMPREHEN METABOLIC PANEL: CPT

## 2017-02-06 PROCEDURE — 85027 COMPLETE CBC AUTOMATED: CPT

## 2017-02-06 PROCEDURE — 85007 BL SMEAR W/DIFF WBC COUNT: CPT

## 2017-02-06 PROCEDURE — 77386 HB NTSTY MODUL RAD TX DLVR CPLX: CPT | Performed by: RADIOLOGY

## 2017-02-06 PROCEDURE — 36415 COLL VENOUS BLD VENIPUNCTURE: CPT

## 2017-02-07 PROCEDURE — 77386 HB NTSTY MODUL RAD TX DLVR CPLX: CPT | Performed by: RADIOLOGY

## 2017-02-07 PROCEDURE — 77336 RADIATION PHYSICS CONSULT: CPT | Performed by: RADIOLOGY

## 2017-02-07 RX ORDER — SODIUM CHLORIDE 9 MG/ML
20 INJECTION, SOLUTION INTRAVENOUS CONTINUOUS
Status: DISCONTINUED | OUTPATIENT
Start: 2017-02-08 | End: 2017-02-11 | Stop reason: HOSPADM

## 2017-02-08 ENCOUNTER — HOSPITAL ENCOUNTER (OUTPATIENT)
Dept: INFUSION CENTER | Facility: CLINIC | Age: 67
Discharge: HOME | End: 2017-02-08
Payer: COMMERCIAL

## 2017-02-08 VITALS
TEMPERATURE: 99.8 F | BODY MASS INDEX: 23.38 KG/M2 | HEIGHT: 66 IN | HEART RATE: 109 BPM | WEIGHT: 145.5 LBS | SYSTOLIC BLOOD PRESSURE: 110 MMHG | RESPIRATION RATE: 16 BRPM | DIASTOLIC BLOOD PRESSURE: 69 MMHG

## 2017-02-08 PROCEDURE — 77386 HB NTSTY MODUL RAD TX DLVR CPLX: CPT | Performed by: RADIOLOGY

## 2017-02-08 PROCEDURE — 96375 TX/PRO/DX INJ NEW DRUG ADDON: CPT

## 2017-02-08 PROCEDURE — 96413 CHEMO IV INFUSION 1 HR: CPT

## 2017-02-08 PROCEDURE — 96367 TX/PROPH/DG ADDL SEQ IV INF: CPT

## 2017-02-08 PROCEDURE — 96417 CHEMO IV INFUS EACH ADDL SEQ: CPT

## 2017-02-08 RX ADMIN — DEXAMETHASONE SODIUM PHOSPHATE: 10 INJECTION, SOLUTION INTRAMUSCULAR; INTRAVENOUS at 08:55

## 2017-02-08 RX ADMIN — SODIUM CHLORIDE 20 ML/HR: 0.9 INJECTION, SOLUTION INTRAVENOUS at 08:55

## 2017-02-08 RX ADMIN — DOCETAXEL 44 MG: 20 INJECTION, SOLUTION, CONCENTRATE INTRAVENOUS at 09:55

## 2017-02-08 RX ADMIN — HYDROCORTISONE SODIUM SUCCINATE 100 MG: 100 INJECTION, POWDER, FOR SOLUTION INTRAMUSCULAR; INTRAVENOUS at 09:24

## 2017-02-08 RX ADMIN — DIPHENHYDRAMINE HYDROCHLORIDE 25 MG: 50 INJECTION, SOLUTION INTRAMUSCULAR; INTRAVENOUS at 09:27

## 2017-02-08 RX ADMIN — CARBOPLATIN 85 MG: 10 INJECTION, SOLUTION INTRAVENOUS at 11:13

## 2017-02-08 NOTE — PROGRESS NOTES
Pt arrived to unit this AM without complaint other than continued on and off laryngitis and also some difficulty with swallowing  Pt states she can tolerate soft foods and liquids  Pt states Dr Brianna Vazquez (rad onc) and Mari Sanchez PA-C are both aware of these symptoms  Pt's CBCD/CMP reviewed and within parameters on pt's orders for chemo today  Pt tolerating Taxotere presently without adverse reaction

## 2017-02-10 PROCEDURE — 77386 HB NTSTY MODUL RAD TX DLVR CPLX: CPT | Performed by: RADIOLOGY

## 2017-02-13 PROCEDURE — 77386 HB NTSTY MODUL RAD TX DLVR CPLX: CPT | Performed by: RADIOLOGY

## 2017-02-14 PROCEDURE — 77386 HB NTSTY MODUL RAD TX DLVR CPLX: CPT | Performed by: RADIOLOGY

## 2017-02-15 PROCEDURE — 77386 HB NTSTY MODUL RAD TX DLVR CPLX: CPT | Performed by: RADIOLOGY

## 2017-02-15 PROCEDURE — 77336 RADIATION PHYSICS CONSULT: CPT | Performed by: RADIOLOGY

## 2017-02-21 ENCOUNTER — TRANSCRIBE ORDERS (OUTPATIENT)
Dept: ADMINISTRATIVE | Facility: HOSPITAL | Age: 67
End: 2017-02-21

## 2017-02-21 ENCOUNTER — ALLSCRIPTS OFFICE VISIT (OUTPATIENT)
Dept: OTHER | Facility: OTHER | Age: 67
End: 2017-02-21

## 2017-02-21 DIAGNOSIS — C34.90 MALIGNANT NEOPLASM OF LUNG, UNSPECIFIED LATERALITY, UNSPECIFIED PART OF LUNG (HCC): Primary | ICD-10-CM

## 2017-02-27 ENCOUNTER — HOSPITAL ENCOUNTER (OUTPATIENT)
Dept: NON INVASIVE DIAGNOSTICS | Facility: CLINIC | Age: 67
Discharge: HOME | End: 2017-02-27
Payer: COMMERCIAL

## 2017-02-27 DIAGNOSIS — I71.4 ABDOMINAL AORTIC ANEURYSM WITHOUT RUPTURE (HCC): ICD-10-CM

## 2017-02-27 PROCEDURE — 93978 VASCULAR STUDY: CPT

## 2017-03-02 ENCOUNTER — ALLSCRIPTS OFFICE VISIT (OUTPATIENT)
Dept: OTHER | Facility: OTHER | Age: 67
End: 2017-03-02

## 2017-03-21 ENCOUNTER — APPOINTMENT (OUTPATIENT)
Dept: LAB | Facility: HOSPITAL | Age: 67
End: 2017-03-21
Payer: COMMERCIAL

## 2017-03-21 ENCOUNTER — TRANSCRIBE ORDERS (OUTPATIENT)
Dept: ADMINISTRATIVE | Facility: HOSPITAL | Age: 67
End: 2017-03-21

## 2017-03-21 ENCOUNTER — HOSPITAL ENCOUNTER (OUTPATIENT)
Dept: RADIOLOGY | Facility: HOSPITAL | Age: 67
Discharge: HOME | End: 2017-03-21
Payer: COMMERCIAL

## 2017-03-21 DIAGNOSIS — C34.90 MALIGNANT NEOPLASM OF LUNG, UNSPECIFIED LATERALITY, UNSPECIFIED PART OF LUNG (HCC): ICD-10-CM

## 2017-03-21 DIAGNOSIS — C34.90 MALIGNANT NEOPLASM OF LUNG, UNSPECIFIED LATERALITY, UNSPECIFIED PART OF LUNG (HCC): Primary | ICD-10-CM

## 2017-03-21 DIAGNOSIS — D64.9 ANEMIA, UNSPECIFIED: ICD-10-CM

## 2017-03-21 LAB
ALBUMIN SERPL BCP-MCNC: 2.3 G/DL (ref 3.5–5)
ALP SERPL-CCNC: 65 U/L (ref 46–116)
ALT SERPL W P-5'-P-CCNC: 12 U/L (ref 12–78)
ANION GAP SERPL CALCULATED.3IONS-SCNC: 9 MMOL/L (ref 4–13)
AST SERPL W P-5'-P-CCNC: 16 U/L (ref 5–45)
BASOPHILS # BLD AUTO: 0.01 THOUSANDS/ΜL (ref 0–0.1)
BASOPHILS NFR BLD AUTO: 0 % (ref 0–1)
BILIRUB SERPL-MCNC: 0.36 MG/DL (ref 0.2–1)
BUN SERPL-MCNC: 10 MG/DL (ref 5–25)
CALCIUM SERPL-MCNC: 8.7 MG/DL (ref 8.3–10.1)
CHLORIDE SERPL-SCNC: 102 MMOL/L (ref 100–108)
CO2 SERPL-SCNC: 27 MMOL/L (ref 21–32)
CREAT SERPL-MCNC: 1.19 MG/DL (ref 0.6–1.3)
EOSINOPHIL # BLD AUTO: 0.13 THOUSAND/ΜL (ref 0–0.61)
EOSINOPHIL NFR BLD AUTO: 3 % (ref 0–6)
ERYTHROCYTE [DISTWIDTH] IN BLOOD BY AUTOMATED COUNT: 22.4 % (ref 11.6–15.1)
GFR SERPL CREATININE-BSD FRML MDRD: 45.7 ML/MIN/1.73SQ M
GLUCOSE P FAST SERPL-MCNC: 96 MG/DL (ref 65–99)
HCT VFR BLD AUTO: 25 % (ref 34.8–46.1)
HGB BLD-MCNC: 7.5 G/DL (ref 11.5–15.4)
LYMPHOCYTES # BLD AUTO: 1.03 THOUSANDS/ΜL (ref 0.6–4.47)
LYMPHOCYTES NFR BLD AUTO: 22 % (ref 14–44)
MCH RBC QN AUTO: 30.5 PG (ref 26.8–34.3)
MCHC RBC AUTO-ENTMCNC: 30 G/DL (ref 31.4–37.4)
MCV RBC AUTO: 102 FL (ref 82–98)
MONOCYTES # BLD AUTO: 0.3 THOUSAND/ΜL (ref 0.17–1.22)
MONOCYTES NFR BLD AUTO: 6 % (ref 4–12)
NEUTROPHILS # BLD AUTO: 3.28 THOUSANDS/ΜL (ref 1.85–7.62)
NEUTS SEG NFR BLD AUTO: 69 % (ref 43–75)
PLATELET # BLD AUTO: 171 THOUSANDS/UL (ref 149–390)
PMV BLD AUTO: 9 FL (ref 8.9–12.7)
POTASSIUM SERPL-SCNC: 3.6 MMOL/L (ref 3.5–5.3)
PROT SERPL-MCNC: 7.4 G/DL (ref 6.4–8.2)
RBC # BLD AUTO: 2.46 MILLION/UL (ref 3.81–5.12)
SODIUM SERPL-SCNC: 138 MMOL/L (ref 136–145)
WBC # BLD AUTO: 4.75 THOUSAND/UL (ref 4.31–10.16)

## 2017-03-21 PROCEDURE — 36415 COLL VENOUS BLD VENIPUNCTURE: CPT

## 2017-03-21 PROCEDURE — 71020 HB CHEST X-RAY 2VW FRONTAL&LATL: CPT

## 2017-03-21 PROCEDURE — 80053 COMPREHEN METABOLIC PANEL: CPT

## 2017-03-21 PROCEDURE — 85025 COMPLETE CBC W/AUTO DIFF WBC: CPT

## 2017-03-22 ENCOUNTER — GENERIC CONVERSION - ENCOUNTER (OUTPATIENT)
Dept: OTHER | Facility: OTHER | Age: 67
End: 2017-03-22

## 2017-03-22 ENCOUNTER — APPOINTMENT (OUTPATIENT)
Dept: RADIATION ONCOLOGY | Facility: CLINIC | Age: 67
End: 2017-03-22
Attending: RADIOLOGY
Payer: COMMERCIAL

## 2017-03-22 PROCEDURE — 99213 OFFICE O/P EST LOW 20 MIN: CPT | Performed by: RADIOLOGY

## 2017-03-25 ENCOUNTER — LAB (OUTPATIENT)
Dept: LAB | Facility: HOSPITAL | Age: 67
End: 2017-03-25
Attending: INTERNAL MEDICINE
Payer: COMMERCIAL

## 2017-03-25 DIAGNOSIS — D64.9 ANEMIA: ICD-10-CM

## 2017-03-25 LAB
ABO GROUP BLD: NORMAL
BLD GP AB SCN SERPL QL: NEGATIVE
RH BLD: POSITIVE

## 2017-03-25 PROCEDURE — 86920 COMPATIBILITY TEST SPIN: CPT

## 2017-03-25 PROCEDURE — 86850 RBC ANTIBODY SCREEN: CPT

## 2017-03-25 PROCEDURE — 86901 BLOOD TYPING SEROLOGIC RH(D): CPT

## 2017-03-25 PROCEDURE — 86900 BLOOD TYPING SEROLOGIC ABO: CPT

## 2017-03-27 ENCOUNTER — HOSPITAL ENCOUNTER (OUTPATIENT)
Dept: INFUSION CENTER | Facility: CLINIC | Age: 67
Discharge: HOME | End: 2017-03-27
Payer: COMMERCIAL

## 2017-03-27 VITALS
SYSTOLIC BLOOD PRESSURE: 120 MMHG | TEMPERATURE: 100.5 F | HEART RATE: 102 BPM | RESPIRATION RATE: 18 BRPM | DIASTOLIC BLOOD PRESSURE: 57 MMHG

## 2017-03-27 PROCEDURE — P9040 RBC LEUKOREDUCED IRRADIATED: HCPCS

## 2017-03-27 PROCEDURE — 36430 TRANSFUSION BLD/BLD COMPNT: CPT

## 2017-03-27 PROCEDURE — P9021 RED BLOOD CELLS UNIT: HCPCS

## 2017-03-27 NOTE — PROGRESS NOTES
Patient tolerated blood [id number] today without complications  Patient states she feels good  Instructed patient to check tempurature when she gets home and if she is above 100 4 to take a tylenol  Patient verbalized understanding

## 2017-03-28 ENCOUNTER — HOSPITAL ENCOUNTER (OUTPATIENT)
Dept: RADIOLOGY | Age: 67
Discharge: HOME | End: 2017-03-28
Payer: COMMERCIAL

## 2017-03-28 DIAGNOSIS — C34.90 MALIGNANT NEOPLASM OF LUNG, UNSPECIFIED LATERALITY, UNSPECIFIED PART OF LUNG (HCC): ICD-10-CM

## 2017-03-28 LAB
ABO GROUP BLD BPU: NORMAL
BPU ID: NORMAL
CROSSMATCH: NORMAL
GLUCOSE SERPL-MCNC: 107 MG/DL (ref 65–140)
UNIT DISPENSE STATUS: NORMAL
UNIT PRODUCT CODE: NORMAL
UNIT RH: NORMAL

## 2017-03-28 PROCEDURE — 78815 PET IMAGE W/CT SKULL-THIGH: CPT

## 2017-03-28 PROCEDURE — 82948 REAGENT STRIP/BLOOD GLUCOSE: CPT

## 2017-03-28 PROCEDURE — A9552 F18 FDG: HCPCS

## 2017-03-28 RX ADMIN — IOHEXOL 5 ML: 240 INJECTION, SOLUTION INTRATHECAL; INTRAVASCULAR; INTRAVENOUS; ORAL at 07:15

## 2017-04-04 ENCOUNTER — ALLSCRIPTS OFFICE VISIT (OUTPATIENT)
Dept: OTHER | Facility: OTHER | Age: 67
End: 2017-04-04

## 2017-04-04 DIAGNOSIS — C34.90 MALIGNANT NEOPLASM OF UNSPECIFIED PART OF UNSPECIFIED BRONCHUS OR LUNG (HCC): ICD-10-CM

## 2017-04-04 DIAGNOSIS — Z12.31 ENCOUNTER FOR SCREENING MAMMOGRAM FOR MALIGNANT NEOPLASM OF BREAST: ICD-10-CM

## 2017-04-05 ENCOUNTER — APPOINTMENT (OUTPATIENT)
Dept: LAB | Facility: HOSPITAL | Age: 67
End: 2017-04-05
Payer: COMMERCIAL

## 2017-04-05 ENCOUNTER — TRANSCRIBE ORDERS (OUTPATIENT)
Dept: ADMINISTRATIVE | Facility: HOSPITAL | Age: 67
End: 2017-04-05

## 2017-04-05 ENCOUNTER — HOSPITAL ENCOUNTER (OUTPATIENT)
Dept: RADIOLOGY | Facility: HOSPITAL | Age: 67
Discharge: HOME | End: 2017-04-05
Payer: COMMERCIAL

## 2017-04-05 DIAGNOSIS — Z85.118 HISTORY OF LUNG CANCER: ICD-10-CM

## 2017-04-05 DIAGNOSIS — C34.91 PRIMARY LUNG CANCER WITH METASTASIS FROM LUNG TO OTHER SITE, RIGHT (HCC): ICD-10-CM

## 2017-04-05 DIAGNOSIS — J18.9 PNEUMONIA, ORGANISM UNSPECIFIED(486): Primary | ICD-10-CM

## 2017-04-05 DIAGNOSIS — R60.9 EDEMA, UNSPECIFIED TYPE: Primary | ICD-10-CM

## 2017-04-05 DIAGNOSIS — R07.81 RIB PAIN ON RIGHT SIDE: ICD-10-CM

## 2017-04-05 DIAGNOSIS — R60.9 EDEMA, UNSPECIFIED TYPE: ICD-10-CM

## 2017-04-05 DIAGNOSIS — J18.9 PNEUMONIA, ORGANISM UNSPECIFIED(486): ICD-10-CM

## 2017-04-05 LAB
ANION GAP SERPL CALCULATED.3IONS-SCNC: 9 MMOL/L (ref 4–13)
BUN SERPL-MCNC: 18 MG/DL (ref 5–25)
CALCIUM SERPL-MCNC: 9 MG/DL (ref 8.3–10.1)
CHLORIDE SERPL-SCNC: 97 MMOL/L (ref 100–108)
CO2 SERPL-SCNC: 30 MMOL/L (ref 21–32)
CREAT SERPL-MCNC: 1.45 MG/DL (ref 0.6–1.3)
GFR SERPL CREATININE-BSD FRML MDRD: 36.4 ML/MIN/1.73SQ M
GLUCOSE SERPL-MCNC: 126 MG/DL (ref 65–140)
POTASSIUM SERPL-SCNC: 3.8 MMOL/L (ref 3.5–5.3)
SODIUM SERPL-SCNC: 136 MMOL/L (ref 136–145)

## 2017-04-05 PROCEDURE — 71100 X-RAY EXAM RIBS UNI 2 VIEWS: CPT

## 2017-04-05 PROCEDURE — 36415 COLL VENOUS BLD VENIPUNCTURE: CPT

## 2017-04-05 PROCEDURE — 71020 HB CHEST X-RAY 2VW FRONTAL&LATL: CPT

## 2017-04-05 PROCEDURE — 80048 BASIC METABOLIC PNL TOTAL CA: CPT

## 2017-04-10 ENCOUNTER — HOSPITAL ENCOUNTER (INPATIENT)
Facility: HOSPITAL | Age: 67
LOS: 3 days | Discharge: HOME | DRG: 206 | End: 2017-04-13
Attending: EMERGENCY MEDICINE | Admitting: INTERNAL MEDICINE
Payer: COMMERCIAL

## 2017-04-10 ENCOUNTER — APPOINTMENT (EMERGENCY)
Dept: CT IMAGING | Facility: HOSPITAL | Age: 67
DRG: 206 | End: 2017-04-10
Payer: COMMERCIAL

## 2017-04-10 ENCOUNTER — APPOINTMENT (EMERGENCY)
Dept: RADIOLOGY | Facility: HOSPITAL | Age: 67
DRG: 206 | End: 2017-04-10
Payer: COMMERCIAL

## 2017-04-10 DIAGNOSIS — C34.90 SQUAMOUS CELL LUNG CANCER (HCC): ICD-10-CM

## 2017-04-10 DIAGNOSIS — J18.9 PNEUMONIA: ICD-10-CM

## 2017-04-10 DIAGNOSIS — R06.89 RESPIRATORY INSUFFICIENCY: Primary | ICD-10-CM

## 2017-04-10 DIAGNOSIS — J96.01 ACUTE HYPOXEMIC RESPIRATORY FAILURE (HCC): ICD-10-CM

## 2017-04-10 LAB
ALBUMIN SERPL BCP-MCNC: 2 G/DL (ref 3.5–5)
ALP SERPL-CCNC: 77 U/L (ref 46–116)
ALT SERPL W P-5'-P-CCNC: 32 U/L (ref 12–78)
ANION GAP SERPL CALCULATED.3IONS-SCNC: 9 MMOL/L (ref 4–13)
AST SERPL W P-5'-P-CCNC: 38 U/L (ref 5–45)
ATRIAL RATE: 83 BPM
BASOPHILS # BLD AUTO: 0.01 THOUSANDS/ΜL (ref 0–0.1)
BASOPHILS NFR BLD AUTO: 0 % (ref 0–1)
BILIRUB SERPL-MCNC: 0.46 MG/DL (ref 0.2–1)
BUN SERPL-MCNC: 20 MG/DL (ref 5–25)
CALCIUM SERPL-MCNC: 9 MG/DL (ref 8.3–10.1)
CHLORIDE SERPL-SCNC: 99 MMOL/L (ref 100–108)
CO2 SERPL-SCNC: 30 MMOL/L (ref 21–32)
CREAT SERPL-MCNC: 1.32 MG/DL (ref 0.6–1.3)
EOSINOPHIL # BLD AUTO: 0.07 THOUSAND/ΜL (ref 0–0.61)
EOSINOPHIL NFR BLD AUTO: 2 % (ref 0–6)
ERYTHROCYTE [DISTWIDTH] IN BLOOD BY AUTOMATED COUNT: 17 % (ref 11.6–15.1)
GFR SERPL CREATININE-BSD FRML MDRD: 40.5 ML/MIN/1.73SQ M
GLUCOSE SERPL-MCNC: 110 MG/DL (ref 65–140)
HCT VFR BLD AUTO: 28.6 % (ref 34.8–46.1)
HGB BLD-MCNC: 8.8 G/DL (ref 11.5–15.4)
LYMPHOCYTES # BLD AUTO: 0.59 THOUSANDS/ΜL (ref 0.6–4.47)
LYMPHOCYTES NFR BLD AUTO: 15 % (ref 14–44)
MCH RBC QN AUTO: 31.3 PG (ref 26.8–34.3)
MCHC RBC AUTO-ENTMCNC: 30.8 G/DL (ref 31.4–37.4)
MCV RBC AUTO: 102 FL (ref 82–98)
MONOCYTES # BLD AUTO: 0.14 THOUSAND/ΜL (ref 0.17–1.22)
MONOCYTES NFR BLD AUTO: 4 % (ref 4–12)
NEUTROPHILS # BLD AUTO: 3.24 THOUSANDS/ΜL (ref 1.85–7.62)
NEUTS SEG NFR BLD AUTO: 79 % (ref 43–75)
NRBC BLD AUTO-RTO: 0 /100 WBCS
NT-PROBNP SERPL-MCNC: 1213 PG/ML
P AXIS: 69 DEGREES
PLATELET # BLD AUTO: 164 THOUSANDS/UL (ref 149–390)
PMV BLD AUTO: 9.6 FL (ref 8.9–12.7)
POTASSIUM SERPL-SCNC: 3 MMOL/L (ref 3.5–5.3)
PR INTERVAL: 130 MS
PROT SERPL-MCNC: 8.3 G/DL (ref 6.4–8.2)
QRS AXIS: 62 DEGREES
QRSD INTERVAL: 78 MS
QT INTERVAL: 344 MS
QTC INTERVAL: 404 MS
RBC # BLD AUTO: 2.81 MILLION/UL (ref 3.81–5.12)
SODIUM SERPL-SCNC: 138 MMOL/L (ref 136–145)
T WAVE AXIS: 42 DEGREES
TROPONIN I SERPL-MCNC: <0.02 NG/ML
VENTRICULAR RATE: 83 BPM
WBC # BLD AUTO: 4.05 THOUSAND/UL (ref 4.31–10.16)

## 2017-04-10 PROCEDURE — 71275 CT ANGIOGRAPHY CHEST: CPT

## 2017-04-10 PROCEDURE — 80053 COMPREHEN METABOLIC PANEL: CPT | Performed by: PODIATRIST

## 2017-04-10 PROCEDURE — 87040 BLOOD CULTURE FOR BACTERIA: CPT | Performed by: PHYSICIAN ASSISTANT

## 2017-04-10 PROCEDURE — 93005 ELECTROCARDIOGRAM TRACING: CPT

## 2017-04-10 PROCEDURE — 99285 EMERGENCY DEPT VISIT HI MDM: CPT

## 2017-04-10 PROCEDURE — 96360 HYDRATION IV INFUSION INIT: CPT

## 2017-04-10 PROCEDURE — 84484 ASSAY OF TROPONIN QUANT: CPT | Performed by: PODIATRIST

## 2017-04-10 PROCEDURE — 30233N1 TRANSFUSION OF NONAUTOLOGOUS RED BLOOD CELLS INTO PERIPHERAL VEIN, PERCUTANEOUS APPROACH: ICD-10-PCS | Performed by: INTERNAL MEDICINE

## 2017-04-10 PROCEDURE — 83880 ASSAY OF NATRIURETIC PEPTIDE: CPT | Performed by: PODIATRIST

## 2017-04-10 PROCEDURE — 36415 COLL VENOUS BLD VENIPUNCTURE: CPT | Performed by: PODIATRIST

## 2017-04-10 PROCEDURE — 71020 HB CHEST X-RAY 2VW FRONTAL&LATL: CPT

## 2017-04-10 PROCEDURE — 85025 COMPLETE CBC W/AUTO DIFF WBC: CPT | Performed by: PODIATRIST

## 2017-04-10 RX ORDER — MAGNESIUM HYDROXIDE/ALUMINUM HYDROXICE/SIMETHICONE 120; 1200; 1200 MG/30ML; MG/30ML; MG/30ML
30 SUSPENSION ORAL EVERY 6 HOURS PRN
Status: DISCONTINUED | OUTPATIENT
Start: 2017-04-10 | End: 2017-04-13 | Stop reason: HOSPADM

## 2017-04-10 RX ORDER — ECHINACEA 400 MG
1 CAPSULE ORAL DAILY
Status: DISCONTINUED | OUTPATIENT
Start: 2017-04-11 | End: 2017-04-10 | Stop reason: RX

## 2017-04-10 RX ORDER — FERROUS SULFATE 325(65) MG
325 TABLET ORAL
Status: DISCONTINUED | OUTPATIENT
Start: 2017-04-11 | End: 2017-04-13 | Stop reason: HOSPADM

## 2017-04-10 RX ORDER — AZITHROMYCIN 250 MG/1
500 TABLET, FILM COATED ORAL EVERY 24 HOURS
Status: DISCONTINUED | OUTPATIENT
Start: 2017-04-10 | End: 2017-04-11

## 2017-04-10 RX ORDER — SODIUM CHLORIDE 9 MG/ML
125 INJECTION, SOLUTION INTRAVENOUS CONTINUOUS
Status: DISCONTINUED | OUTPATIENT
Start: 2017-04-10 | End: 2017-04-10

## 2017-04-10 RX ORDER — PROPRANOLOL/HYDROCHLOROTHIAZID 40 MG-25MG
500 TABLET ORAL DAILY
Status: DISCONTINUED | OUTPATIENT
Start: 2017-04-11 | End: 2017-04-10 | Stop reason: RX

## 2017-04-10 RX ORDER — KRILL/OM-3/DHA/EPA/PHOSPHO/AST 500MG-86MG
500 CAPSULE ORAL DAILY
Status: DISCONTINUED | OUTPATIENT
Start: 2017-04-11 | End: 2017-04-10 | Stop reason: RX

## 2017-04-10 RX ORDER — ONDANSETRON 2 MG/ML
4 INJECTION INTRAMUSCULAR; INTRAVENOUS EVERY 6 HOURS PRN
Status: DISCONTINUED | OUTPATIENT
Start: 2017-04-10 | End: 2017-04-13 | Stop reason: HOSPADM

## 2017-04-10 RX ORDER — FERROUS SULFATE 325(65) MG
325 TABLET ORAL
COMMUNITY

## 2017-04-10 RX ORDER — HEPARIN SODIUM 5000 [USP'U]/ML
5000 INJECTION, SOLUTION INTRAVENOUS; SUBCUTANEOUS EVERY 8 HOURS SCHEDULED
Status: DISCONTINUED | OUTPATIENT
Start: 2017-04-10 | End: 2017-04-13 | Stop reason: HOSPADM

## 2017-04-10 RX ORDER — POTASSIUM CHLORIDE 20 MEQ/1
40 TABLET, EXTENDED RELEASE ORAL ONCE
Status: COMPLETED | OUTPATIENT
Start: 2017-04-10 | End: 2017-04-10

## 2017-04-10 RX ORDER — PERPHENAZINE/AMITRIPTYLINE HCL 4MG-10MG
1 TABLET ORAL DAILY
Status: DISCONTINUED | OUTPATIENT
Start: 2017-04-11 | End: 2017-04-10 | Stop reason: RX

## 2017-04-10 RX ORDER — CALCIUM CARBONATE 500(1250)
1 TABLET ORAL
Status: DISCONTINUED | OUTPATIENT
Start: 2017-04-11 | End: 2017-04-13 | Stop reason: HOSPADM

## 2017-04-10 RX ORDER — FUROSEMIDE 40 MG/1
40 TABLET ORAL DAILY
Status: DISCONTINUED | OUTPATIENT
Start: 2017-04-11 | End: 2017-04-13 | Stop reason: HOSPADM

## 2017-04-10 RX ORDER — LEVALBUTEROL 1.25 MG/.5ML
1.25 SOLUTION, CONCENTRATE RESPIRATORY (INHALATION) EVERY 6 HOURS PRN
Status: DISCONTINUED | OUTPATIENT
Start: 2017-04-10 | End: 2017-04-13 | Stop reason: HOSPADM

## 2017-04-10 RX ORDER — FUROSEMIDE 20 MG/1
40 TABLET ORAL DAILY
COMMUNITY
End: 2017-05-12 | Stop reason: ALTCHOICE

## 2017-04-10 RX ORDER — BUDESONIDE AND FORMOTEROL FUMARATE DIHYDRATE 160; 4.5 UG/1; UG/1
2 AEROSOL RESPIRATORY (INHALATION) 2 TIMES DAILY
Status: DISCONTINUED | OUTPATIENT
Start: 2017-04-10 | End: 2017-04-13 | Stop reason: HOSPADM

## 2017-04-10 RX ORDER — GUAIFENESIN 600 MG
600 TABLET, EXTENDED RELEASE 12 HR ORAL 2 TIMES DAILY
Status: DISCONTINUED | OUTPATIENT
Start: 2017-04-10 | End: 2017-04-13 | Stop reason: HOSPADM

## 2017-04-10 RX ADMIN — SODIUM CHLORIDE 125 ML/HR: 0.9 INJECTION, SOLUTION INTRAVENOUS at 16:03

## 2017-04-10 RX ADMIN — CEFEPIME HYDROCHLORIDE 2000 MG: 2 INJECTION, POWDER, FOR SOLUTION INTRAVENOUS at 21:31

## 2017-04-10 RX ADMIN — GUAIFENESIN 600 MG: 600 TABLET, EXTENDED RELEASE ORAL at 20:44

## 2017-04-10 RX ADMIN — HEPARIN SODIUM 5000 UNITS: 5000 INJECTION, SOLUTION INTRAVENOUS; SUBCUTANEOUS at 21:56

## 2017-04-10 RX ADMIN — BUDESONIDE AND FORMOTEROL FUMARATE DIHYDRATE 2 PUFF: 160; 4.5 AEROSOL RESPIRATORY (INHALATION) at 21:00

## 2017-04-10 RX ADMIN — AZITHROMYCIN MONOHYDRATE 500 MG: 250 TABLET ORAL at 20:45

## 2017-04-10 RX ADMIN — IODIXANOL 85 ML: 320 INJECTION, SOLUTION INTRAVASCULAR at 16:43

## 2017-04-10 RX ADMIN — POTASSIUM CHLORIDE 40 MEQ: 1500 TABLET, EXTENDED RELEASE ORAL at 20:44

## 2017-04-10 NOTE — H&P
History and Physical - [facility]    Patient Information: Jose Miguel Garzon 61 y o  female MRN: [id number]  Unit/Bed#: ED 14 Encounter: [id number]  Admitting Physician: Roslyn Farias PA-C  PCP: Dima Reina MD  Date of Admission:  04/10/17    Assessment/Plan  Patient is a very pleasant 72-year-old female with squamous cell lung cancer currently undergoing radiation and chemotherapy  The patient presents with worsening shortness of breath at ×3-4 days  The patient says that she has a pulse oximeter at home and she checked her oxygen levels and noted it was in the 70s and she came to the ER for evaluation  In the ER the patient was noted to be hypoxic with oxygen level at 86%  She was initially afebrile but she did spike a temperature to 100 5  The patient had a mildly decreased white blood cell count of 4 05, hemoglobin of 8 8  Other notable labs were a creatinine of 1 32 and a potassium of 3 0  Patient proceeded to get a chest x-ray showed similar extensive interstitial and aVL are densities in the right lung  The patient proceeded to get a CT of the chest given the hypoxia and history of lung cancer which fortunately did not show any pulmonary embolism but did show significant new areas of right lung consolidation representing pneumonia in the upper lobe  Of note there was also a slightly increased size as the right pleural effusion and decreased size in the cavitary lesion      Hospital Problem List:     Principal Problem:    Acute hypoxemic respiratory failure  Active Problems:    Pneumonia    Tobacco abuse    COPD (chronic obstructive pulmonary disease)    Abdominal aortic aneurysm (AAA) greater than 39 mm in diameter    Squamous cell lung cancer    Renal artery stenosis      Plan for the Primary Problem(s):  1   Acute hypoxic respiratory failure secondary to pneumonia/concern for pseudomonasupon further questioning the patient tells me that she did get an x-ray approximately 10 days ago by her primary care doctor  It was noted that there was worsening opacification of the right hemithorax and her primary care doctor did place her on an antibiotic  She does not know the name of it she claims that she was on it for 10 days  Presumably failed outpatient therapy and or concern for pseudomonas given her immunocompromised state and currently getting chemotherapy  Will check the usual respiratory antigens  Check blood cultures prior to the start of antibiotics of cefepime and Zithromax  Continue oxygen and initiate respiratory protocol and add Mucinex  The patient is known to Dr Leila Canales of [place] [address] [facility]  We will kindly asked pulmonology to see the patient in consultation  Plan for Additional Problems:   2  COPD/emphysemano acute exacerbation  The patient is on Symbicort  Please see #1   3  Squamous cell lung cancerwith decrease in the mass  Patient follows with Dr Asenath Claude as well as Dr Vasquez Green of radiation oncology  4  Renal insufficiencythe patient's creatinine is 1 3 her baseline is approximately one  The patient is on Lasix  Given the pleural effusion  , continue the home dose of Lasix  Give consideration to possible IV diuresis  Monitor creatinine  5  Hypokalemia3 0  We'll provide K Dur 40 mEq  6  Anemia likely secondary to cancer, patient is stable  She is also taking iron supplementation  Monitor    VTE Prophylaxis: Heparin   Code Status: Full code    Anticipated Length of Stay:  Patient will be admitted on an Inpatient basis with an anticipated length of stay of  Greater than 2 midnights  Total Time for Visit, including Counseling / Coordination of Care: 45 minutes  Greater than 50% of this total time spent on direct patient counseling and coordination of care  Chief Complaint:     Short of breath    History of Present Illness:    Millicent Aquino is a 61 y o  female who presents with worsening shortness of breath over the past 4 days   The patient checked her pulse oximeter and noted it was in the 70s  She has been coughing more frequently with mucus production  She denies any fevers or chills  She tells me approximately 10 days ago she was diagnosed with possible pneumonia and completed antibiotics  She him has not been feeling better  She has been compliant with her other medications and inhalers  She denies any substernal chest pain palpitations  No abdominal pain nausea vomiting diarrhea or constipation  She further denies any dysuria or urinary retention    Review of Systems:  A 12-point review of systems was done  Please see the HPI for the full details  All other systems negative  Past Medical and Surgical History:     Past Medical History:   Diagnosis Date    Asthma     COPD (chronic obstructive pulmonary disease)     Juarez's neuroma     Renal artery stenosis     Left    Squamous cell lung cancer     lung cancer    Tobacco abuse        Past Surgical History:   Procedure Laterality Date    BRONCHOSCOPY N/A 10/13/2016    Procedure: Isabella Betancur;  Surgeon: Jess Olmstead MD;  [facility]: [facility];  Service:     FOOT SURGERY Bilateral     MS AAA REPAIR,MODULR BIFUR PROSTH,2-DOCK N/A 10/26/2016    Procedure: REPAIR ANEURYSM ENDOVASCULAR ABDOMINAL AORTIC  (EVAR);   Surgeon: Ely Rebolledo MD;  [facility];  Service: Vascular    MS BRONCHOSCOPY NEEDLE BX TRACHEA MAIN STEM&/BRON N/A 10/13/2016    Procedure: EBUS; FROZEN SECTION ;  Surgeon: Jess Olmstead MD;  Location[facility];  Service: Thoracic    TUBAL LIGATION         Meds/Allergies:    Current Facility-Administered Medications   Medication Dose Route Frequency Provider Last Rate Last Dose    sodium chloride 0 9 % infusion  125 mL/hr Intravenous Continuous Payel Filiberto 125 mL/hr at 04/10/17 1603 125 mL/hr at 04/10/17 1603     Current Outpatient Prescriptions   Medication Sig Dispense Refill    budesonide-formoterol (SYMBICORT) 160-4 5 mcg/act inhaler Inhale 2 puffs 2 (two) times a day  CALCIUM-MAGNESIUM-ZINC PO Take 1 tablet by mouth daily   Cholecalciferol (VITAMIN D PO) Take 2 tablets by mouth daily   Cinnamon 500 MG TABS Take 1 capsule by mouth daily   Coconut Oil 1000 MG CAPS Take 1 capsule by mouth daily   ferrous sulfate 325 (65 Fe) mg tablet Take 325 mg by mouth daily with breakfast      Flaxseed, Linseed, (FLAXSEED OIL) 1000 MG CAPS Take 1 capsule by mouth daily   furosemide (LASIX) 20 mg tablet Take 40 mg by mouth daily      KRILL OIL PO Take 500 mg by mouth daily   Multiple Vitamin (MULTIVITAMIN) tablet Take 1 tablet by mouth daily   Multiple Vitamins-Minerals (OCUVITE PO) Take 1 tablet by mouth daily   Plant Sterols and Stanols (CHOLEST OFF PO) Take 450 mg by mouth daily        TURMERIC PO Take 500 mg by mouth daily           Allergies   Allergen Reactions    Paclitaxel Shortness Of Breath     Pt became flushed, experienced back pain, dizziness and chest tightness    Other Itching     Environmental ( trees, grass, flowers)     Codeine Hives, Itching and Rash     Other reaction(s): Hives, Itching, Rash       Social History:     Marital Status: /Civil Union     Substance Use History:   History   Alcohol Use    Yes     Comment: rarely     History   Smoking Status    Former Smoker    Packs/day: 2 00    Years: 35 00    Quit date: 12/3/2007   Smokeless Tobacco    Not on file     History   Drug Use No       Family History:    Family History   Problem Relation Age of Onset    Brain cancer Mother     Kidney failure Father        Physical Exam:   Vitals:   Blood Pressure: 108/56 (04/10/17 1713)  Pulse: 88 (04/10/17 1713)  Temperature: 97 9 °F (36 6 °C) (04/10/17 1216)  Temp Source: Temporal (04/10/17 1216)  Respirations: 20 (04/10/17 1713)  Weight - Scale: 58 3 kg (128 lb 9 6 oz) (04/10/17 1216)  SpO2: 99 % (04/10/17 1713)    General Appearance:    Alert, cooperative, no distress, appears older than stated age   [de-identified]: Atraumatic, EOMs intact, Mucous membranes moist without   exudates   Neck:   Supple, symmetrical, trachea midline, no adenopathy;     thyroid:  no enlargement/tenderness/nodules; no carotid    bruit or JVD   Lungs:     poor air exchange on the right hemithorax with think crackles throughout  Better breath sounds on the left but still coarse  No wheezes appreciated bilaterally  No respiratory distress    Chest Wall:    No tenderness or deformity    Heart:    Regular rate and rhythm, S1 and S2 normal, no murmur, rub    or gallop   Abdomen:     Soft, non-tender, bowel sounds active all four quadrants,     no masses, no organomegaly   Extremities:   Extremities normal, atraumatic, no cyanosis or edema   Pulses:   2+ and symmetric all extremities   Skin:   Skin color, texture, turgor normal, no rashes or lesions   Lymph nodes:   Cervical, supraclavicular, and axillary nodes normal   Neurologic:   CNII-XII intact, normal strength, sensation and reflexes     throughout       Lab Results: I have personally reviewed pertinent reports  Results from last 7 days  Lab Units 04/10/17  1441   WBC Thousand/uL 4 05*   HEMOGLOBIN g/dL 8 8*   HEMATOCRIT % 28 6*   PLATELETS Thousands/uL 164   NEUTROS PCT % 79*   LYMPHS PCT % 15   MONOS PCT % 4   EOS PCT % 2       Results from last 7 days  Lab Units 04/10/17  1441   SODIUM mmol/L 138   POTASSIUM mmol/L 3 0*   CHLORIDE mmol/L 99*   CO2 mmol/L 30   BUN mg/dL 20   CREATININE mg/dL 1 32*   CALCIUM mg/dL 9 0   TOTAL PROTEIN g/dL 8 3*   BILIRUBIN TOTAL mg/dL 0 46   ALK PHOS U/L 77   ALT U/L 32   AST U/L 38   GLUCOSE RANDOM mg/dL 110           Imaging: I have personally reviewed pertinent reports  Xr Chest 2 Views    Result Date: 4/10/2017  Narrative: CHEST - DUAL ENERGY INDICATION:  Shortness of breath  COMPARISON:  April 5, 2017 VIEWS:  PA (including soft tissue/bone algorithms) and lateral projections IMAGES:  4 FINDINGS:     Cardiomediastinal silhouette appears unremarkable  There is extensive consolidative opacity in the right lung with some areas of scarring  Cavitary lesion right lung base again noted  Visualized osseous structures appear within normal limits for the patient's age  Impression: Similar extensive interstitial and alveolar densities in the right lung particularly in the mid lung  There is a cavitary lesion at the right lung base again noted as well  Workstation performed: [id number]       Xr Ribs 2 Vw Right    Result Date: 4/6/2017  Narrative: RIGHT RIBS INDICATION:  Right mid and axillary rib pain with cough  No necrotic right basilar lung mass  COMPARISON:  None VIEWS:  3 views right hemithorax IMAGES:  4 FINDINGS: There is no fracture or pathologic bone lesion  Soft tissues are unremarkable  The visualized right hemithorax demonstrates a cystic mass of the right lung base similar to prior study with surrounding opacity for which infectious or inflammatory changes not excluded  Bronchoalveolar infiltration of tumor is also a possibility  Increased opacity right midlung zone laterally could reflect worsening metastatic disease versus underlying infectious/inflammatory process  Similar increased opacity noted at the right apex as well  Prominent interstitial markings left lung base as before  Small right basilar loculated pleural effusion  Aortic stent graft noted  There is no pneumothorax  Impression: 1  No fracture  2   Persistent cystic right basilar lung mass consistent with known necrotic tumor  Worsening right hemithorax opacification could reflect worsening metastases with underlying infectious or inflammatory process also a possibility  3   Stable right basilar pleural effusion  Workstation performed: [id number]       Cta Ed Chest Pe Study    Result Date: 4/10/2017  Narrative: CTA - CHEST WITH IV CONTRAST - PULMONARY ANGIOGRAM INDICATION: Chest pain  Shortness of breath    Lung cancer patient on chemotherapy COMPARISON: PET CT scan from 3/28/2017 TECHNIQUE: CTA examination of the chest was performed using angiographic technique according to a protocol specifically tailored to evaluate for pulmonary embolism  Reformatted images were created in axial, sagittal, and coronal planes  In addition, coronal 3D MIP postprocessing was performed on the acquisition scanner  Radiation dose length product (DLP) for this visit:  185 mGy-cm   This examination, like all CT scans performed in the [facility], was performed utilizing techniques to minimize radiation dose exposure, including the use of iterative reconstruction and automated exposure control  IV Contrast:  85 mL of iodixanol (VISIPAQUE)           FINDINGS: PULMONARY ARTERIAL TREE:  No pulmonary embolus is seen  LUNGS:  There is severe bullous centrilobular emphysema bilaterally right greater than left  There is also significant finding of cavitated lesion in the right lung base which is similar in appearance to prior studies  Diameter of the cavity is 3 6 cm on today's study, previously up to 4 8 cm  There is an air-fluid level within the cavity  This could be necrotic tumor or an infected bulla  There is surrounding parenchymal consolidation  There is also peripheral parenchymal consolidation in the lateral aspect of the right upper lobe which is moderately significant and is new since the previous exam   There are areas of right lower lobe bronchial obstruction which might be mucous plugging  The left-sided airways appear clear  No acute findings  in the left lung  There is scarring at the apex  PLEURA:  There is a small right pleural effusion which is larger than on the prior PET scan  No left-sided effusion  HEART/AORTA:  Coronary calcification noted  Heart size normal   Aorta intact  MEDIASTINUM AND TRIPP:  There is some right hilar adenopathy  There is also borderline prominent AP window lymph node    AP window lymph node appears slightly more prominent than on the previous PET scan  Current estimated size 10 x 15 mm  CHEST WALL AND LOWER NECK:  Unremarkable  VISUALIZED STRUCTURES IN THE UPPER ABDOMEN:  The included portion of the spleen seems large  No acute upper abdominal pathology visible  A portion of an aortic stent is seen  OSSEOUS STRUCTURES:  There is degenerative arthritis of the right shoulder joint  Impression: No evidence of pulmonary emboli  Slightly increased size of right pleural effusion compared with prior PET scan and significant new areas of right lung consolidation presumably representing pneumonia in the upper lobe  Slight interval decrease in the size of the cavitary lesion at the right base with an air-fluid level noted  Severe centrilobular bullous emphysema bilaterally Stable to perhaps slightly increased size of an AP window lymph node and mild stable right hilar adenopathy  The spleen appears large ##[id number]##[id number]    Workstation performed: [id number]       EKG, Pathology, and Other Studies Reviewed on Admission:   83bpm NSR nonspecific st changes    [employer] Records Reviewed:  Yes

## 2017-04-10 NOTE — ED PROVIDER NOTES
History  Chief Complaint   Patient presents with    Breathing Difficulty     pt states ongoing for the last 4-5 days  hx of lung cancer  states checks own oxygen level at home and it has been running low  recent appt with oncologist and states it was okay  denies CP  HPI Comments: 61year old female with significant PMH of Squamous cell carcinoma of the right lung, COPD and history of Pneumonia with ARF presents with difficulty breathing  She has been short of breath for approximately 4-5 days  She does have a pulse oximeter which she checks her oxygen saturation with at home and noted it to be 73%  She feels especially bad       History provided by:  Patient and spouse   used: No        Prior to Admission Medications   Prescriptions Last Dose Informant Patient Reported? Taking? CALCIUM-MAGNESIUM-ZINC PO   Yes Yes   Sig: Take 1 tablet by mouth daily  Cholecalciferol (VITAMIN D PO)   Yes Yes   Sig: Take 2 tablets by mouth daily  Cinnamon 500 MG TABS   Yes Yes   Sig: Take 1 capsule by mouth daily  Coconut Oil 1000 MG CAPS   Yes Yes   Sig: Take 1 capsule by mouth daily  Flaxseed, Linseed, (FLAXSEED OIL) 1000 MG CAPS   Yes Yes   Sig: Take 1 capsule by mouth daily  KRILL OIL PO   Yes Yes   Sig: Take 500 mg by mouth daily  Multiple Vitamin (MULTIVITAMIN) tablet   Yes Yes   Sig: Take 1 tablet by mouth daily  Multiple Vitamins-Minerals (OCUVITE PO)   Yes Yes   Sig: Take 1 tablet by mouth daily  Plant Sterols and Stanols (CHOLEST OFF PO)   Yes Yes   Sig: Take 450 mg by mouth daily     TURMERIC PO   Yes Yes   Sig: Take 500 mg by mouth daily       budesonide-formoterol (SYMBICORT) 160-4 5 mcg/act inhaler   Yes Yes   Sig: Inhale 2 puffs 2 (two) times a day   ferrous sulfate 325 (65 Fe) mg tablet   Yes Yes   Sig: Take 325 mg by mouth daily with breakfast   furosemide (LASIX) 20 mg tablet   Yes Yes   Sig: Take 40 mg by mouth daily      Facility-Administered Medications: None Past Medical History:   Diagnosis Date    COPD (chronic obstructive pulmonary disease)     Larry's neuroma     Renal artery stenosis     Left    Squamous cell lung cancer     Stage IIB-IIIa, Completed Taxotere and carboplatin with radiation therapy in February 2017       Past Surgical History:   Procedure Laterality Date    BRONCHOSCOPY N/A 10/13/2016    Procedure: Meryle Peacemaker;  Surgeon: Ariel Slater MD;  Location: [facility];  Service:    Reshma Diones FOOT SURGERY Bilateral     LA AAA REPAIR,MODULR SMITA FOSTER,2-DOCK N/A 10/26/2016    Procedure: REPAIR ANEURYSM ENDOVASCULAR ABDOMINAL AORTIC  (EVAR); Surgeon: Morteza Mays MD;  Location: [facility];  Service: Vascular    LA BRONCHOSCOPY NEEDLE BX TRACHEA MAIN STEM&/BRON N/A 10/13/2016    Procedure: EBUS; FROZEN SECTION ;  Surgeon: Ariel Slater MD;  Location: [facility];  Service: Thoracic    TUBAL LIGATION         Family History   Problem Relation Age of Onset    Brain cancer Mother     Kidney failure Father      I have reviewed and agree with the history as documented  Social History   Substance Use Topics    Smoking status: Former Smoker     Packs/day: 2 00     Years: 35 00     Types: Cigarettes     Quit date: 12/3/2007    Smokeless tobacco: None    Alcohol use Yes      Comment: rarely        Review of Systems   Constitutional: Negative for chills and fatigue  HENT: Negative for ear pain  Eyes: Negative for pain  Respiratory: Positive for chest tightness and shortness of breath  Cardiovascular: Positive for chest pain  Gastrointestinal: Negative for abdominal pain  Genitourinary: Negative for flank pain and pelvic pain  Musculoskeletal: Negative for myalgias  Neurological: Negative for seizures and numbness  Hematological: Negative for adenopathy         Physical Exam    ED Triage Vitals   Temperature Pulse Respirations Blood Pressure SpO2   04/10/17 1216 04/10/17 1216 04/10/17 1216 04/10/17 1216 04/10/17 1216   97 9 °F (36 6 °C) 107 18 105/57 86 %      Temp Source Heart Rate Source Patient Position BP Location FiO2 (%)   04/10/17 1216 04/10/17 1442 04/10/17 1442 04/10/17 1442 --   Temporal Monitor Lying Right arm       Pain Score       04/10/17 1216       No Pain           Physical Exam   Constitutional: She is oriented to person, place, and time  HENT:   Head: Normocephalic  Eyes: EOM are normal  Pupils are equal, round, and reactive to light  Neck: Normal range of motion  Neck supple  Cardiovascular: A regularly irregular rhythm present  Tachycardia present  Pulses:       Dorsalis pedis pulses are 1+ on the right side, and 1+ on the left side  Posterior tibial pulses are 1+ on the right side, and 1+ on the left side  Abdominal: Soft  Bowel sounds are normal  She exhibits no distension  There is no tenderness  Musculoskeletal: Normal range of motion  Neurological: She is alert and oriented to person, place, and time         ED Medications  Medications   multivitamin-minerals (CENTRUM) tablet 1 tablet (1 tablet Oral Given 4/11/17 0839)   budesonide-formoterol (SYMBICORT) 160-4 5 mcg/act inhaler 2 puff (2 puffs Inhalation Given 4/11/17 0839)   ferrous sulfate tablet 325 mg (325 mg Oral Given 4/11/17 0839)   calcium carbonate (OYSTER SHELL,OSCAL) 500 mg tablet 1 tablet (1 tablet Oral Given 4/11/17 0839)   furosemide (LASIX) tablet 40 mg (40 mg Oral Given 4/11/17 0839)   ondansetron (ZOFRAN) injection 4 mg (not administered)   aluminum-magnesium hydroxide-simethicone (MYLANTA) 200-200-20 mg/5 mL oral suspension 30 mL (not administered)   heparin (porcine) subcutaneous injection 5,000 Units (5,000 Units Subcutaneous Given 4/11/17 1352)   cefepime (MAXIPIME) 2,000 mg in dextrose 5 % 50 mL IVPB (2,000 mg Intravenous New Bag 4/11/17 0840)   guaiFENesin (MUCINEX) 12 hr tablet 600 mg (600 mg Oral Given 4/11/17 0839)   levalbuterol (XOPENEX) inhalation solution 1 25 mg (not administered)   sodium chloride 0 9 % inhalation solution 3 mL (not administered)   acetaminophen (TYLENOL) tablet 650 mg (650 mg Oral Given 4/11/17 1011)   predniSONE tablet 60 mg (60 mg Oral Given 4/11/17 1222)   iodixanol (VISIPAQUE) 320 MG/ML injection 85 mL (85 mL Intravenous Given 4/10/17 1643)   potassium chloride (K-DUR,KLOR-CON) CR tablet 40 mEq (40 mEq Oral Given 4/10/17 2044)       Diagnostic Studies  Labs Reviewed   NT-BNP PRO (BRAIN NATRIURETIC PEPTIDE) - Abnormal        Result Value Ref Range Status    NT-proBNP 1213 (*) <125 pg/mL Final   COMPREHENSIVE METABOLIC PANEL - Abnormal     Potassium 3 0 (*) 3 5 - 5 3 mmol/L Final    Chloride 99 (*) 100 - 108 mmol/L Final    Creatinine 1 32 (*) 0 60 - 1 30 mg/dL Final    Comment: Standardized to IDMS reference method    Total Protein 8 3 (*) 6 4 - 8 2 g/dL Final    Albumin 2 0 (*) 3 5 - 5 0 g/dL Final    Sodium 138  136 - 145 mmol/L Final    CO2 30  21 - 32 mmol/L Final    Anion Gap 9  4 - 13 mmol/L Final    BUN 20  5 - 25 mg/dL Final    Glucose 110  65 - 140 mg/dL Final    Comment: If the patient is fasting, the ADA then defines impaired fasting glucose as > 100 mg/dL and diabetes as > or equal to 123 mg/dL  Calcium 9 0  8 3 - 10 1 mg/dL Final    AST 38  5 - 45 U/L Final    ALT 32  12 - 78 U/L Final    Alkaline Phosphatase 77  46 - 116 U/L Final    Total Bilirubin 0 46  0 20 - 1 00 mg/dL Final    eGFR 40 5  ml/min/1 73sq m Final    Narrative:     National Kidney Disease Education Program recommendations are as follows:  GFR calculation is accurate only with a steady state creatinine  Chronic Kidney disease less than 60 ml/min/1 73 sq  meters  Kidney failure less than 15 ml/min/1 73 sq  meters     CBC AND DIFFERENTIAL - Abnormal     WBC 4 05 (*) 4 31 - 10 16 Thousand/uL Final    RBC 2 81 (*) 3 81 - 5 12 Million/uL Final    Hemoglobin 8 8 (*) 11 5 - 15 4 g/dL Final    Hematocrit 28 6 (*) 34 8 - 46 1 % Final     (*) 82 - 98 fL Final    MCHC 30 8 (*) 31 4 - 37 4 g/dL Final    RDW 17 0 (*) 11 6 - 15 1 % Final    Neutrophils Relative 79 (*) 43 - 75 % Final    Lymphocytes Absolute 0 59 (*) 0 60 - 4 47 Thousands/µL Final    Monocytes Absolute 0 14 (*) 0 17 - 1 22 Thousand/µL Final    MCH 31 3  26 8 - 34 3 pg Final    MPV 9 6  8 9 - 12 7 fL Final    Platelets 443  380 - 390 Thousands/uL Final    nRBC 0  /100 WBCs Final    Lymphocytes Relative 15  14 - 44 % Final    Monocytes Relative 4  4 - 12 % Final    Eosinophils Relative 2  0 - 6 % Final    Basophils Relative 0  0 - 1 % Final    Neutrophils Absolute 3 24  1 85 - 7 62 Thousands/µL Final    Eosinophils Absolute 0 07  0 00 - 0 61 Thousand/µL Final    Basophils Absolute 0 01  0 00 - 0 10 Thousands/µL Final   BASIC METABOLIC PANEL - Abnormal     Calcium 7 9 (*) 8 3 - 10 1 mg/dL Final    Sodium 140  136 - 145 mmol/L Final    Potassium 4 0  3 5 - 5 3 mmol/L Final    Chloride 105  100 - 108 mmol/L Final    CO2 27  21 - 32 mmol/L Final    Anion Gap 8  4 - 13 mmol/L Final    BUN 16  5 - 25 mg/dL Final    Creatinine 1 09  0 60 - 1 30 mg/dL Final    Comment: Standardized to IDMS reference method    Glucose 100  65 - 140 mg/dL Final    Comment: If the patient is fasting, the ADA then defines impaired fasting glucose as > 100 mg/dL and diabetes as > or equal to 123 mg/dL  eGFR 50 5  ml/min/1 73sq m Final    Narrative:     National Kidney Disease Education Program recommendations are as follows:  GFR calculation is accurate only with a steady state creatinine  Chronic Kidney disease less than 60 ml/min/1 73 sq  meters  Kidney failure less than 15 ml/min/1 73 sq  meters     CBC AND PLATELET - Abnormal     WBC 3 89 (*) 4 31 - 10 16 Thousand/uL Final    RBC 2 43 (*) 3 81 - 5 12 Million/uL Final    Hemoglobin 7 6 (*) 11 5 - 15 4 g/dL Final    Hematocrit 24 6 (*) 34 8 - 46 1 % Final     (*) 82 - 98 fL Final    MCHC 30 9 (*) 31 4 - 37 4 g/dL Final    RDW 17 2 (*) 11 6 - 15 1 % Final    Platelets 399 (*) 340 - 390 Thousands/uL Final    MCH 31 3  26 8 - 34 3 pg Final    MPV 9 5  8 9 - 12 7 fL Final   STREP PNEUMONIAE ANTIGEN,URINE - Normal    Strep pneumoniae antigen, urine Negative  Negative Final   TROPONIN I - Normal    Troponin I <0 02  <=0 04 ng/mL Final    Comment: 3Autovalidation override    Narrative:     [employer] Chemistry analyzer 99% cutoff is > 0 04 ng/mL in network labs    o cTnI 99% cutoff is useful only when applied to patients in the clinical setting of myocardial ischemia  o cTnI 99% cutoff should be interpreted in the context of clinical history, ECG findings and possibly cardiac imaging to establish correct diagnosis  o cTnI 99% cutoff may be suggestive but clearly not indicative of a coronary event without the clinical setting of myocardial ischemia  LEGIONELLA ANTIGEN, URINE - Normal    Legionella Urinary Antigen Negative  Negative Final   BLOOD CULTURE   BLOOD CULTURE   PLATELET COUNT   HEMOGLOBIN AND HEMATOCRIT, BLOOD   PREPARE RBC    Unit Product Code O1938I10       Unit Number [id number]       Unit ABO A       Unit RH POS       Unit Dispense Status Issued      TYPE AND SCREEN    ABO Grouping AB   Final    Rh Factor Positive   Final    Antibody Screen Negative   Final       RADIOLOGY RESULTS   Final Result      CTA ED chest PE study   Final Result      No evidence of pulmonary emboli  Slightly increased size of right pleural effusion compared with prior PET scan and significant new areas of right lung consolidation presumably representing pneumonia in the upper lobe  Slight interval decrease in the size of the cavitary lesion at the right base with an air-fluid level noted  Severe centrilobular bullous emphysema bilaterally      Stable to perhaps slightly increased size of an AP window lymph node and mild stable right hilar adenopathy        The spleen appears large         ##[email or web address]##[email or web address]                        Workstation performed: [id number]         XR chest 2 views   Final Result      Similar extensive interstitial and alveolar densities in the right lung particularly in the mid lung  There is a cavitary lesion at the right lung base again noted as well  Workstation performed: [id number]             Procedures  Procedures      Phone Consults  ED Phone Contact    ED Course  ED Course         Labs ordered, reviewed  EKG reviewed  Of note elevated BNP, elevated creatinine  CTA showed no evidence of PE  CXR unchanged from prior reviewed with attending  Admit for Acute hypoxic respiratory failure to [facility]IM, discussed with medicine team for further workup  MDM  Number of Diagnoses or Management Options  Respiratory insufficiency:     CritCare Time    Disposition  Final diagnoses:   Respiratory insufficiency     ED Disposition     ED Disposition Condition Comment    Admit  Case was discussed with [facility] and the patient's admission status was agreed to be Admission Status: inpatient status to the service of Dr Hussein Michel   Follow-up Information     None        Current Discharge Medication List      CONTINUE these medications which have NOT CHANGED    Details   budesonide-formoterol (SYMBICORT) 160-4 5 mcg/act inhaler Inhale 2 puffs 2 (two) times a day      CALCIUM-MAGNESIUM-ZINC PO Take 1 tablet by mouth daily  Cholecalciferol (VITAMIN D PO) Take 2 tablets by mouth daily  Cinnamon 500 MG TABS Take 1 capsule by mouth daily  Coconut Oil 1000 MG CAPS Take 1 capsule by mouth daily  ferrous sulfate 325 (65 Fe) mg tablet Take 325 mg by mouth daily with breakfast      Flaxseed, Linseed, (FLAXSEED OIL) 1000 MG CAPS Take 1 capsule by mouth daily  furosemide (LASIX) 20 mg tablet Take 40 mg by mouth daily      KRILL OIL PO Take 500 mg by mouth daily  Multiple Vitamin (MULTIVITAMIN) tablet Take 1 tablet by mouth daily  Multiple Vitamins-Minerals (OCUVITE PO) Take 1 tablet by mouth daily          Plant Sterols and Stanols (CHOLEST OFF PO) Take 450 mg by mouth daily TURMERIC PO Take 500 mg by mouth daily  No discharge procedures on file  ED Provider  Attending physically available and evaluated Paul Tam I managed the patient along with the ED Attending      Electronically Signed by       Bianka Gambino  Resident  04/11/17 3913

## 2017-04-10 NOTE — IP AVS SNAPSHOT
[address] [place] [address]  [place], [address] [place] [address]  [phone]            After Visit Summary for:             Rupa Johns   61 yrs Female (1950)    MRN:  [id number]           About your hospitalization     You were admitted on:  April 10, 2017 You last received care in the:  [address]    You were discharged on:  April 13, 2017 Unit phone number:  [phone]         Medications     It is important that you maintain an up-to-date list of medications (prescribed and over-the-counter, as well as vitamins and mineral supplements) that you are taking  Bring your list of current medications whenever you seek treatment at a hospital or other healthcare setting  If you have any questions or concerns, contact your primary care physician's office  Your Medications      TAKE these medications     budesonide-formoterol 160-4 5 mcg/act inhaler   Inhale 2 puffs 2 (two) times a day   Last time this was given:  2 puffs on 4/13/2017  8:34 AM   Refills:  0   Commonly known as:  SYMBICORT   Next Dose Due:  4/13 at 6pm           CALCIUM-MAGNESIUM-ZINC PO   Take 1 tablet by mouth daily  Refills:  0   Next Dose Due:  4/14 at [address] OFF PO   Take 450 mg by mouth daily   Refills:  0   Next Dose Due:  4/14 at 8am           Cinnamon 500 MG Tabs   Take 1 capsule by mouth daily  Refills:  0   Next Dose Due:  4/14 at 8am           Coconut Oil 1000 MG Caps   Take 1 capsule by mouth daily  Refills:  0   Next Dose Due:  4/14 at 8am           ferrous sulfate 325 (65 Fe) mg tablet   Take 325 mg by mouth daily with breakfast   Last time this was given:  325 mg on 4/13/2017  8:34 AM   Refills:  0   Next Dose Due:  4/14 at 8am           Flaxseed Oil 1000 MG Caps   Take 1 capsule by mouth daily     Refills:  0   Next Dose Due:  4/14 at 8am           furosemide 20 mg tablet   Take 40 mg by mouth daily   Last time this was given:  40 mg on 4/13/2017  8:34 AM   Refills:  0   Commonly known as:  LASIX   Next Dose Due:  4/14 at [facility]   Take 500 mg by mouth daily  Refills:  0   Next Dose Due:  4/14 at 8am           multivitamin tablet   Take 1 tablet by mouth daily  Refills:  0   Next Dose Due:  4/14 at [facility]   Take 1 tablet by mouth daily  Last time this was given:  1 tablet on 4/13/2017  8:34 AM   Refills:  0   Next Dose Due:  4/14 at 8am           TURMERIC PO   Take 500 mg by mouth daily  Refills:  0   Next Dose Due:  4/14 at 8am           VITAMIN D PO   Take 2 tablets by mouth daily  Refills:  0   Next Dose Due:  4/14 at 8am                      Your Medications      Your Medication List           Morning Noon Evening Bedtime As Needed    budesonide-formoterol 160-4 5 mcg/act inhaler   Inhale 2 puffs 2 (two) times a day   Last time this was given:  2 puffs on 4/13/2017  8:34 AM   Commonly known as:  SYMBICORT   Next Dose Due:  4/13 at 6pm                                      CALCIUM-MAGNESIUM-ZINC PO   Take 1 tablet by mouth daily  Next Dose Due:  4/14 at [address] [place] [address] PO   Take 450 mg by mouth daily   Next Dose Due:  4/14 at 8am                                   Cinnamon 500 MG Tabs   Take 1 capsule by mouth daily  Next Dose Due:  4/14 at 8am                                   Coconut Oil 1000 MG Caps   Take 1 capsule by mouth daily  Next Dose Due:  4/14 at 8am                                   ferrous sulfate 325 (65 Fe) mg tablet   Take 325 mg by mouth daily with breakfast   Last time this was given:  325 mg on 4/13/2017  8:34 AM   Next Dose Due:  4/14 at 8am                                   Flaxseed Oil 1000 MG Caps   Take 1 capsule by mouth daily     Next Dose Due:  4/14 at 8am                                   furosemide 20 mg tablet   Take 40 mg by mouth daily   Last time this was given:  40 mg on 4/13/2017  8:34 AM   Commonly known as:  LASIX   Next Dose Due:  4/14 at [address]   Take 500 mg by mouth daily  Next Dose Due:  4/14 at 8am                                   multivitamin tablet   Take 1 tablet by mouth daily  Next Dose Due:  4/14 at [address]   Take 1 tablet by mouth daily  Last time this was given:  1 tablet on 4/13/2017  8:34 AM   Next Dose Due:  4/14 at 8am                                   TURMERIC PO   Take 500 mg by mouth daily  Next Dose Due:  4/14 at 8am                                   VITAMIN D PO   Take 2 tablets by mouth daily  Next Dose Due:  4/14 at [address] for After Discharge        Follow-up Information     Follow up with Galen Seth MD  Schedule an appointment as soon as possible for a visit in 2 week(s)      Specialties:  Pulmonary Disease, Pulmonology    Contact information:    [address]  [address]  [phone]        Pending Labs     Order Current Status    Blood culture Preliminary result    Blood culture Preliminary result      Your Allergies  Date Reviewed: 4/10/2017    Allergen Reactions    Paclitaxel Shortness Of Breath    Pt became flushed, experienced back pain, dizziness and chest tightness         Other Itching    Environmental ( trees, grass, flowers)          Codeine Hives  Itching  Rash    Other reaction(s): Hives, Itching, Rash      POLST/Adv Directive          Most Recent Value    POLST  Patient does not have POLST - would not like information    Advance Directive  Patient does not have advance directive - would not like information          Instructions for After Discharge           Summary of Your Hospitalization        Reason for Hospitalization     Your primary diagnosis was:  Acute Hypoxemic Respiratory Failure    Your diagnoses also included:  Pneumonia, Tobacco Abuse, Copd (Chronic Obstructive Pulmonary Disease), Squamous Cell Lung Cancer, Abdominal Aortic Aneurysm (Aaa) Greater Than 44 Mm In Diameter, Renal Artery Stenosis      Chief Complaint     Breathing Difficulty      Attending/Consulting Providers     Provider Service Role Specialty Primary office phone    Dena Gutierrez MD -- Attending Provider Internal Medicine [phone]    Roslyn Farias PA-C -- Advanced Practitioner  Internal Medicine  [phone]    Nicole Eddy MD Pulmonology Consulting Physician  Pulmonary Disease [phone]         Last Resulted Labs     Date/Time Component Value Reference Range Units Lab Status    04/13/17 0521 WBC 6 19 4 31 - 10 16 Thousand/uL Thousand/uL Final result    04/13/17 0521 HGB 8 3 11 5 - 15 4 g/dL g/dL Final result    04/13/17 0521 HCT 26 9 34 8 - 46 1 % % Final result    04/13/17 0521  149 - 390 Thousands/uL Thousands/uL Final result    02/06/17 1040 BANDSPCT 6 0 - 8 % % Final result    04/12/17 0458  136 - 145 mmol/L mmol/L Final result    04/12/17 0458 K 4 2 3 5 - 5 3 mmol/L mmol/L Final result    04/12/17 0458  100 - 108 mmol/L mmol/L Final result    04/12/17 0458 CO2 29 21 - 32 mmol/L mmol/L Final result    04/12/17 0458 BUN 17 5 - 25 mg/dL mg/dL Final result    04/12/17 0458 CREATININE 0 99 0 60 - 1 30 mg/dL mg/dL Final result    04/12/17 0458 GLUCOSE 146 65 - 140 mg/dL mg/dL Final result    04/10/17 1441 ALKPHOS 77 46 - 116 U/L U/L Final result    04/10/17 1441 ALT 32 12 - 78 U/L U/L Final result    04/10/17 1441 AST 38 5 - 45 U/L U/L Final result    04/10/17 1441 ALBUMIN 2 0 3 5 - 5 0 g/dL g/dL Final result    04/10/17 1441 BILITOT 0 46 0 20 - 1 00 mg/dL mg/dL Final result    04/10/17 1441 TROPONINI <0 02 <=0 04 ng/mL ng/mL Final result    12/13/16 0805 INR 1 07 0 86 - 1 16 - Final result      [employer]     To access this document and other health information online, please visit www  [employer] to login or create a patient portal account     For questions about any pending test results, you may contact the ordering physician or your primary care provider  Discharge Weight          Most Recent Value    Weight  58 5 kg (128 lb 15 5 oz) filed at 04/10/2017 1930      Information on COPD     You have Chronic Obstructive Lung Disease  Please review the following to reduce your risk of suffering from an exacerbation (flare up) of COPD, which may contribute to a quicker decline in your lung function  Discharge Medications: Follow the instructions given to you regarding discharge medications  Discontinuing steroids or other medications before recommended may lead to a re-hospitalization  Maintenance inhalers (long term) will help reduce exacerbations  Take these every day, regardless of your respiratory symptoms  Rescue inhalers (quick relief) should be used when needed for breathing difficulty, but no more than prescribed  Risk factors for  COPD exacerbations:    Tobacco cessation: Discontinuing use of ALL tobacco products and other inhaled substances is the most important aspect of reducing your risk for COPD exacerbations and other serious pulmonary & cardiovascular illnesses  If you are a tobacco user: STOP  Talk with your physician or other healthcare provider about cessation aides to help you quit  Counseling is available through the [address] [place] [address] ([phone]) or the [facility] hotline (1-204-XSDM-SYD) and is free of charge  Infection:  Receive the influenza vaccination every year  Discuss with your doctor the best time to receive the pneumococcal (pneumonia) vaccination  Avoid large crowds, especially during flu season  Wash your hands as often as possible to prevent the spread of bacteria which is more likely to cause a serious respiratory infection in patients with COPD  Inhaled irritants: Avoid strong fumes that come from cleaning products, cooking and perfumes   These may trigger tightness in the airways, causing respiratory distress  * Recovering from an exacerbation of COPD takes time--healing continues at home after discharge  Do not rush yourself or plan return to work or other daily activities too quickly, or you may suffer a relapse  Conserve energy and plan tasks so they are spaced out throughout the day  COPD Zone Tool:    Stay in the GREEN zone, where you are able to maintain normal activity   TAKE ACTION in the YELLOW zone (call your doctor or COPD coordinator)  Yellow Zone symptoms may include: Increasing shortness of breath with or without activity, increase in cough or sputum production OR change in sputum appearance (creamy, green, thicker), fever/chills   If you fall into the RED zone (symptoms unchanged with rescue inhalers, confusion or increasing drowsiness, dusky colored nail beds, inability to sleep because of breathing) call your medical doctor to be seen that day or call 911 if symptoms are too severe  Educational Information     Venous Thromboembolism (VTE) / Deep Vein Thrombosis (DVT) Prevention   VTE/DVT is a disease caused by blood clots that form in veins  Blood clots can be serious and common in patients with risk factors  VTE/DVT may occur after discharge  To decrease risks, take anticoagulation medicine if ordered by your doctor  Report any calf pain, swelling or tenderness and/or shortness of breath to your doctor immediately  Smoking Cessation   If you smoke, use tobacco or nicotine, and/or are exposed to second hand smoke, you are encouraged to stop to improve your health  If you need help quitting, please talk to your health care provider or call:  · Aj Chavis ([phone])  · [facility] [place] ([phone])   · [address] [place] [address] ([phone])      If your symptoms return or if your condition unexpectedly worsens from the time of discharge, notify your doctor or go to the nearest emergency room      If you think you are experiencing a medical emergency, call 911  Readmission Risk Score     Arin Solis is committed to ensuring a safe discharge plan that will allow you to continue your recovery at home  Your risk for readmission has been determined to be HIGH      To prevent readmission, please be sure to:   See your health care provider within 1 week of discharge   Follow your discharge instructions    Take your medication as prescribed   Call your health care provider with any questions or concerns

## 2017-04-10 NOTE — ED ATTENDING ATTESTATION
Antonio Woods MD, saw and evaluated the patient  I have discussed the patient with the resident/non-physician practitioner and agree with the resident's/non-physician practitioner's findings, Plan of Care, and MDM as documented in the resident's/non-physician practitioner's note, except where noted  All available labs and Radiology studies were reviewed  At this point I agree with the current assessment done in the Emergency Department  I have conducted an independent evaluation of this patient a history and physical is as follows:  60 yo female with COPD not on continuous oxygen, currently undergoing treatment for lung CA, S/P XRT, and previous h/o chemotherapy, c/o increasing dyspnea over the past 5 days, for which she measured SpO2 as low as 70s  She is currently under care by Dr August Baltazar who has recently seen her and is recommending another round of chemotherapy  Denies fever, chills, N/V  Denies chest pain  VS notable for hypoxemia on room at arrival, tachycardia  Differential includes complications of current cancer, CHF, COPD exacerbation contributing, and also considering PE, which may require imaging or presumptive treatment  Admit          Critical Care Time  CritCare Time

## 2017-04-11 ENCOUNTER — APPOINTMENT (INPATIENT)
Dept: OCCUPATIONAL THERAPY | Facility: HOSPITAL | Age: 67
DRG: 206 | End: 2017-04-11
Payer: COMMERCIAL

## 2017-04-11 ENCOUNTER — APPOINTMENT (INPATIENT)
Dept: PHYSICAL THERAPY | Facility: HOSPITAL | Age: 67
DRG: 206 | End: 2017-04-11
Payer: COMMERCIAL

## 2017-04-11 LAB
ABO GROUP BLD: NORMAL
ANION GAP SERPL CALCULATED.3IONS-SCNC: 8 MMOL/L (ref 4–13)
BLD GP AB SCN SERPL QL: NEGATIVE
BUN SERPL-MCNC: 16 MG/DL (ref 5–25)
CALCIUM SERPL-MCNC: 7.9 MG/DL (ref 8.3–10.1)
CHLORIDE SERPL-SCNC: 105 MMOL/L (ref 100–108)
CO2 SERPL-SCNC: 27 MMOL/L (ref 21–32)
CREAT SERPL-MCNC: 1.09 MG/DL (ref 0.6–1.3)
ERYTHROCYTE [DISTWIDTH] IN BLOOD BY AUTOMATED COUNT: 17.2 % (ref 11.6–15.1)
GFR SERPL CREATININE-BSD FRML MDRD: 50.5 ML/MIN/1.73SQ M
GLUCOSE SERPL-MCNC: 100 MG/DL (ref 65–140)
HCT VFR BLD AUTO: 24.6 % (ref 34.8–46.1)
HGB BLD-MCNC: 7.6 G/DL (ref 11.5–15.4)
L PNEUMO1 AG UR QL IA.RAPID: NEGATIVE
MCH RBC QN AUTO: 31.3 PG (ref 26.8–34.3)
MCHC RBC AUTO-ENTMCNC: 30.9 G/DL (ref 31.4–37.4)
MCV RBC AUTO: 101 FL (ref 82–98)
PLATELET # BLD AUTO: 129 THOUSANDS/UL (ref 149–390)
PMV BLD AUTO: 9.5 FL (ref 8.9–12.7)
POTASSIUM SERPL-SCNC: 4 MMOL/L (ref 3.5–5.3)
RBC # BLD AUTO: 2.43 MILLION/UL (ref 3.81–5.12)
RH BLD: POSITIVE
S PNEUM AG UR QL: NEGATIVE
SODIUM SERPL-SCNC: 140 MMOL/L (ref 136–145)
WBC # BLD AUTO: 3.89 THOUSAND/UL (ref 4.31–10.16)

## 2017-04-11 PROCEDURE — 86900 BLOOD TYPING SEROLOGIC ABO: CPT | Performed by: INTERNAL MEDICINE

## 2017-04-11 PROCEDURE — 86901 BLOOD TYPING SEROLOGIC RH(D): CPT | Performed by: INTERNAL MEDICINE

## 2017-04-11 PROCEDURE — G8988 SELF CARE GOAL STATUS: HCPCS

## 2017-04-11 PROCEDURE — P9040 RBC LEUKOREDUCED IRRADIATED: HCPCS

## 2017-04-11 PROCEDURE — 97163 PT EVAL HIGH COMPLEX 45 MIN: CPT

## 2017-04-11 PROCEDURE — 85027 COMPLETE CBC AUTOMATED: CPT | Performed by: PHYSICIAN ASSISTANT

## 2017-04-11 PROCEDURE — 86923 COMPATIBILITY TEST ELECTRIC: CPT

## 2017-04-11 PROCEDURE — G8978 MOBILITY CURRENT STATUS: HCPCS

## 2017-04-11 PROCEDURE — 86850 RBC ANTIBODY SCREEN: CPT | Performed by: INTERNAL MEDICINE

## 2017-04-11 PROCEDURE — 87449 NOS EACH ORGANISM AG IA: CPT | Performed by: PHYSICIAN ASSISTANT

## 2017-04-11 PROCEDURE — 80048 BASIC METABOLIC PNL TOTAL CA: CPT | Performed by: PHYSICIAN ASSISTANT

## 2017-04-11 PROCEDURE — 94760 N-INVAS EAR/PLS OXIMETRY 1: CPT

## 2017-04-11 PROCEDURE — G8979 MOBILITY GOAL STATUS: HCPCS

## 2017-04-11 PROCEDURE — 97166 OT EVAL MOD COMPLEX 45 MIN: CPT

## 2017-04-11 PROCEDURE — G8987 SELF CARE CURRENT STATUS: HCPCS

## 2017-04-11 RX ORDER — ACETAMINOPHEN 325 MG/1
650 TABLET ORAL EVERY 6 HOURS PRN
Status: DISCONTINUED | OUTPATIENT
Start: 2017-04-11 | End: 2017-04-13 | Stop reason: HOSPADM

## 2017-04-11 RX ORDER — PREDNISONE 20 MG/1
60 TABLET ORAL DAILY
Status: DISCONTINUED | OUTPATIENT
Start: 2017-04-11 | End: 2017-04-13 | Stop reason: HOSPADM

## 2017-04-11 RX ORDER — SODIUM CHLORIDE FOR INHALATION 0.9 %
3 VIAL, NEBULIZER (ML) INHALATION EVERY 6 HOURS PRN
Status: DISCONTINUED | OUTPATIENT
Start: 2017-04-11 | End: 2017-04-13 | Stop reason: HOSPADM

## 2017-04-11 RX ADMIN — HEPARIN SODIUM 5000 UNITS: 5000 INJECTION, SOLUTION INTRAVENOUS; SUBCUTANEOUS at 21:18

## 2017-04-11 RX ADMIN — BUDESONIDE AND FORMOTEROL FUMARATE DIHYDRATE 2 PUFF: 160; 4.5 AEROSOL RESPIRATORY (INHALATION) at 08:39

## 2017-04-11 RX ADMIN — GUAIFENESIN 600 MG: 600 TABLET, EXTENDED RELEASE ORAL at 08:39

## 2017-04-11 RX ADMIN — CEFEPIME HYDROCHLORIDE 2000 MG: 2 INJECTION, POWDER, FOR SOLUTION INTRAVENOUS at 08:40

## 2017-04-11 RX ADMIN — Medication 1 TABLET: at 08:39

## 2017-04-11 RX ADMIN — FERROUS SULFATE TAB 325 MG (65 MG ELEMENTAL FE) 325 MG: 325 (65 FE) TAB at 08:39

## 2017-04-11 RX ADMIN — HEPARIN SODIUM 5000 UNITS: 5000 INJECTION, SOLUTION INTRAVENOUS; SUBCUTANEOUS at 05:10

## 2017-04-11 RX ADMIN — FUROSEMIDE 40 MG: 40 TABLET ORAL at 08:39

## 2017-04-11 RX ADMIN — PREDNISONE 60 MG: 20 TABLET ORAL at 12:22

## 2017-04-11 RX ADMIN — ACETAMINOPHEN 650 MG: 325 TABLET, FILM COATED ORAL at 10:11

## 2017-04-11 RX ADMIN — GUAIFENESIN 600 MG: 600 TABLET, EXTENDED RELEASE ORAL at 17:15

## 2017-04-11 RX ADMIN — BUDESONIDE AND FORMOTEROL FUMARATE DIHYDRATE 2 PUFF: 160; 4.5 AEROSOL RESPIRATORY (INHALATION) at 17:15

## 2017-04-11 RX ADMIN — HEPARIN SODIUM 5000 UNITS: 5000 INJECTION, SOLUTION INTRAVENOUS; SUBCUTANEOUS at 13:52

## 2017-04-11 RX ADMIN — CEFEPIME HYDROCHLORIDE 2000 MG: 2 INJECTION, POWDER, FOR SOLUTION INTRAVENOUS at 21:10

## 2017-04-11 NOTE — PLAN OF CARE

## 2017-04-11 NOTE — PROGRESS NOTES
[facility] Progress Note  Patient: Carmine House 61 y o  female   MRN: [id number]  PCP: Eileen Tarango MD  Unit/Bed#: E4 -01 Encounter: [id number]  Date Of Visit: 17    Assessment:    Principal Problem:    Acute hypoxemic respiratory failure  Active Problems:    Pneumonia    Tobacco abuse    COPD (chronic obstructive pulmonary disease)    Abdominal aortic aneurysm (AAA) greater than 39 mm in diameter    Squamous cell lung cancer    Renal artery stenosis      Plan:    · Acute hypoxemic respiratory failure, multifactorial from combination of possible radiation pneumonitis and her significant anemia also continued tobacco abuse in setting of lung cancer  She'll probably need supplemental oxygen at discharge with home O2 eval prior  · Radiation pneumonitis will continue empiric dosage of cefepime to cover Pseudomonas as been started by pulmonary on milligram per kilogram prednisone  · Anemia in relation to her underlying chronic disease shortness of lung cancer along with chemotherapy probably contributing to her present hypoxemic respiratory failure  · Abdominal aortic aneurysm asymptomatic  · COPD continue nebulizers and oxygen, severe emphysema continue Symbicort follow with Dr Nilam Sharma  ·       VTE Pharmacologic Prophylaxis:   Pharmacologic: Heparin  Mechanical VTE Prophylaxis in Place: Yes    Discussions with Specialists or Other Care Team Provider: Yes Dr Addie Sanon for Care: 30 minutes  More than 50% of total time spent on counseling and coordination of care as described above        Subjective: Sure breath on exertion denies any chest pain denies any cough production or phlegm eyes febrile course or such as chills but was noted to have low-grade temperature earlier      Objective: Looks underweight    Vitals:   Temp (24hrs), Av 6 °F (37 6 °C), Min:97 5 °F (36 4 °C), Max:101 °F (38 3 °C)    HR:  [] 93  Resp:  [16-20] 20  BP: (100-113)/(50-63) 109/58  SpO2:  [94 %-99 %] 95 %  Body mass index is 20 82 kg/(m^2)  Input and Output Summary (last 24 hours): Intake/Output Summary (Last 24 hours) at 04/11/17 1224  Last data filed at 04/11/17 0701   Gross per 24 hour   Intake           803 75 ml   Output              400 ml   Net           403 75 ml       Physical Exam:     Physical Exam:   General appearance: alert, appears stated age and cooperative  Head: Normocephalic, without obvious abnormality, atraumatic  Lungs: clear to auscultation bilaterally  Heart: regular rate and rhythm  Abdomen: soft, non-tender; bowel sounds normal; no masses,  no organomegaly  Back: negative  Extremities: extremities normal, atraumatic, no cyanosis or edema  Neurologic: Grossly normal      Additional Data:     Labs:      Results from last 7 days  Lab Units 04/11/17  0502 04/10/17  1441   WBC Thousand/uL 3 89* 4 05*   HEMOGLOBIN g/dL 7 6* 8 8*   HEMATOCRIT % 24 6* 28 6*   PLATELETS Thousands/uL 129* 164   NEUTROS PCT %  --  79*   LYMPHS PCT %  --  15   MONOS PCT %  --  4   EOS PCT %  --  2       Results from last 7 days  Lab Units 04/11/17  0502 04/10/17  1441   SODIUM mmol/L 140 138   POTASSIUM mmol/L 4 0 3 0*   CHLORIDE mmol/L 105 99*   CO2 mmol/L 27 30   BUN mg/dL 16 20   CREATININE mg/dL 1 09 1 32*   CALCIUM mg/dL 7 9* 9 0   TOTAL PROTEIN g/dL  --  8 3*   BILIRUBIN TOTAL mg/dL  --  0 46   ALK PHOS U/L  --  77   ALT U/L  --  32   AST U/L  --  38   GLUCOSE RANDOM mg/dL 100 110           * I Have Reviewed All Lab Data Listed Above  * Additional Pertinent Lab Tests Reviewed: All Labs For Current Hospital Admission Reviewed    Imaging:  Xr Chest 2 Views    Result Date: 4/10/2017  Narrative: CHEST - DUAL ENERGY INDICATION:  Shortness of breath  COMPARISON:  April 5, 2017 VIEWS:  PA (including soft tissue/bone algorithms) and lateral projections IMAGES:  4 FINDINGS:     Cardiomediastinal silhouette appears unremarkable   There is extensive consolidative opacity in the right lung with some areas of scarring  Cavitary lesion right lung base again noted  Visualized osseous structures appear within normal limits for the patient's age  Impression: Similar extensive interstitial and alveolar densities in the right lung particularly in the mid lung  There is a cavitary lesion at the right lung base again noted as well  Workstation performed: [id number]     Xr Chest Pa & Lateral    Result Date: 4/5/2017  Narrative: CHEST INDICATION: Shortness of breath  History of lung carcinoma  COMPARISON: 3/21/2017  VIEWS:  Frontal and lateral projections; 2 images FINDINGS:    The cardiomediastinal silhouette is unremarkable  Extensive right-sided pleural-parenchymal scarring is identified  Cavitary right lung base mass again present  Osseous structures are age appropriate  Impression: Extensive right-sided pleural-parenchymal scarring again seen  Cavitary right lung base mass  Workstation performed: [id number]     Xr Chest Pa & Lateral    Result Date: 3/21/2017  Narrative: CHEST - DUAL ENERGY INDICATION: Shortness of breath  Cough  Tachycardia  Right lung cancer  COMPARISON: 12/13/2016 and prior VIEWS:  PA (including soft tissue/bone algorithms) and lateral projections; 4 images FINDINGS: The cardiomediastinal silhouette is stable  There is marked reduction in size of the right lower lobe mass/cavitary lesion  There is residual adjacent patchy opacity and there is a new small right effusion  May be partially loculated  Stable plaque-like opacity right apex  Bones are stable  Impression: Marked reduction in size of the right lower lobe mass/cavitary lesion  Residual adjacent patchy tumor and/or inflammatory disease  Cannot exclude superimposed pneumonia  New small right effusion  ##imslh##imslh Workstation performed: [id number]     Xr Ribs 2 Vw Right    Result Date: 4/6/2017  Narrative: RIGHT RIBS INDICATION:  Right mid and axillary rib pain with cough    No necrotic right basilar lung mass  COMPARISON:  None VIEWS:  3 views right hemithorax IMAGES:  4 FINDINGS: There is no fracture or pathologic bone lesion  Soft tissues are unremarkable  The visualized right hemithorax demonstrates a cystic mass of the right lung base similar to prior study with surrounding opacity for which infectious or inflammatory changes not excluded  Bronchoalveolar infiltration of tumor is also a possibility  Increased opacity right midlung zone laterally could reflect worsening metastatic disease versus underlying infectious/inflammatory process  Similar increased opacity noted at the right apex as well  Prominent interstitial markings left lung base as before  Small right basilar loculated pleural effusion  Aortic stent graft noted  There is no pneumothorax  Impression: 1  No fracture  2   Persistent cystic right basilar lung mass consistent with known necrotic tumor  Worsening right hemithorax opacification could reflect worsening metastases with underlying infectious or inflammatory process also a possibility  3   Stable right basilar pleural effusion  Workstation performed: [id number]     Nm Pet Ct Skull Base To Mid Thigh    Result Date: 3/28/2017  Narrative: PET/CT SCAN INDICATION: Lung cancer, restaging post chemoradiation, prior aortic aneurysm repair 10/2016 MODIFIER:     PS COMPARISON: 12/9/2016 CELL TYPE:  Squamous cell carcinoma, right lower lung biopsy 12/13/2016 TECHNIQUE:   8 5 mCi F-18-FDG administered IV  Multiplanar attenuation corrected and non attenuation corrected PET images are available for interpretation, and contiguous, low dose, axial CT sections were obtained from the skull base through the femurs following the administration of oral contrast material (7 5 cc Omnipaque-240 in 300 cc water)  Intravenous contrast material was not utilized  Fasting serum glucose: 107 mg/dl FINDINGS: VISUALIZED BRAIN:   No acute abnormalities are seen   HEAD/NECK:   Stable hypermetabolic left parotid gland nodule, measuring 11 x 6 mm, SUV 16 7, prior measurement 11 x 7 mm, SUV 19 8  This likely represents a primary parotid lesion such as a Warthin's tumor or pleomorphic adenoma  A metastasis is felt to be less likely  No new FDG avid cervical adenopathy is seen  CT images: Asymmetric right thyroid appears similar  CHEST:   Necrotic right lower lung mass measures approximately 4 8 x 3 7 cm in axial dimensions, with decreased FDG activity, SUV 7 4, prior SUV 19 2  This likely represents some response to therapy, however viable tumor may still remain  There are increased surrounding pleural parenchymal densities with hypermetabolism, SUV 6  This may be due to posttreatment inflammatory changes  Coexistent tumor is also not excluded  Smaller densities are noted in the right mid upper lung field  Right apical pleural-parenchymal densities demonstrate interval increased hypermetabolism, SUV 5, prior SUV 3 3  This may also be inflammatory, but may be reassessed on follow-up exam to exclude tumor involvement  Stable left apical pleural parenchymal scarring with only minimal FDG activity, SUV 1 9, prior SUV 2  Stable right hilar activity, SUV 3 1, prior study 3 3  Subcarinal activity also appears similar, SUV 3 3, prior SUV 3 3  This measures 9 mm short axis diameter, prior measurement 9 mm  No new hypermetabolic adenopathy  CT images: Severe emphysema  Coronary artery calcifications  ABDOMEN:   No FDG avid soft tissue lesions are seen  CT images: Aortic aneurysm repair with stent graft  Native aorta measures 6 5 cm, prior measurement 6 5 cm  Stable borderline splenomegaly measuring 15 cm craniocaudad dimension  PELVIS: No FDG avid soft tissue lesions are seen  CT images: Stable  OSSEOUS STRUCTURES: No FDG avid lesions are seen  CT images: Stable  Impression: 1  Decreased FDG activity of right lower lung mass, suggesting some response to therapy  However viable tumor may still remain    Increased hypermetabolic adjacent pleural parenchymal densities, likely from posttreatment changes  Tumor involvement not excluded  2   Increased hypermetabolism of right apical pleural-parenchymal densities, possibly just inflammatory, however tumor involvement is not excluded  This may be reassessed on follow-up exam  3   Right hilar and mediastinal roya activity without significant change  No new hypermetabolic adenopathy  4   No new hypermetabolic metastases in the abdomen and pelvis or neck  5   Stable hypermetabolic left parotid gland nodule  Workstation performed: [id number]     Radiology Results    Result Date: 4/11/2017  Narrative: Ordered by an unspecified provider  Cta Ed Chest Pe Study    Result Date: 4/10/2017  Narrative: CTA - CHEST WITH IV CONTRAST - PULMONARY ANGIOGRAM INDICATION: Chest pain  Shortness of breath  Lung cancer patient on chemotherapy COMPARISON: PET CT scan from 3/28/2017 TECHNIQUE: CTA examination of the chest was performed using angiographic technique according to a protocol specifically tailored to evaluate for pulmonary embolism  Reformatted images were created in axial, sagittal, and coronal planes  In addition, coronal 3D MIP postprocessing was performed on the acquisition scanner  Radiation dose length product (DLP) for this visit:  185 mGy-cm   This examination, like all CT scans performed in the [facility], was performed utilizing techniques to minimize radiation dose exposure, including the use of iterative reconstruction and automated exposure control  IV Contrast:  85 mL of iodixanol (VISIPAQUE)           FINDINGS: PULMONARY ARTERIAL TREE:  No pulmonary embolus is seen  LUNGS:  There is severe bullous centrilobular emphysema bilaterally right greater than left  There is also significant finding of cavitated lesion in the right lung base which is similar in appearance to prior studies    Diameter of the cavity is 3 6 cm on today's study, previously up to 4 8 cm  There is an air-fluid level within the cavity  This could be necrotic tumor or an infected bulla  There is surrounding parenchymal consolidation  There is also peripheral parenchymal consolidation in the lateral aspect of the right upper lobe which is moderately significant and is new since the previous exam   There are areas of right lower lobe bronchial obstruction which might be mucous plugging  The left-sided airways appear clear  No acute findings  in the left lung  There is scarring at the apex  PLEURA:  There is a small right pleural effusion which is larger than on the prior PET scan  No left-sided effusion  HEART/AORTA:  Coronary calcification noted  Heart size normal   Aorta intact  MEDIASTINUM AND TRIPP:  There is some right hilar adenopathy  There is also borderline prominent AP window lymph node  AP window lymph node appears slightly more prominent than on the previous PET scan  Current estimated size 10 x 15 mm  CHEST WALL AND LOWER NECK:  Unremarkable  VISUALIZED STRUCTURES IN THE UPPER ABDOMEN:  The included portion of the spleen seems large  No acute upper abdominal pathology visible  A portion of an aortic stent is seen  OSSEOUS STRUCTURES:  There is degenerative arthritis of the right shoulder joint  Impression: No evidence of pulmonary emboli  Slightly increased size of right pleural effusion compared with prior PET scan and significant new areas of right lung consolidation presumably representing pneumonia in the upper lobe  Slight interval decrease in the size of the cavitary lesion at the right base with an air-fluid level noted  Severe centrilobular bullous emphysema bilaterally Stable to perhaps slightly increased size of an AP window lymph node and mild stable right hilar adenopathy   The spleen appears large ##imslh##imslh    Workstation performed: [id number]     Imaging Reports Reviewed Today Include: Reviewed chest x-ray and CTA of chest  Imaging Personally Reviewed by Myself Includes:  *no  Procedure: Xr Chest 2 Views    Result Date: 4/10/2017  Narrative: CHEST - DUAL ENERGY INDICATION:  Shortness of breath  COMPARISON:  April 5, 2017 VIEWS:  PA (including soft tissue/bone algorithms) and lateral projections IMAGES:  4 FINDINGS:     Cardiomediastinal silhouette appears unremarkable  There is extensive consolidative opacity in the right lung with some areas of scarring  Cavitary lesion right lung base again noted  Visualized osseous structures appear within normal limits for the patient's age  Impression: Similar extensive interstitial and alveolar densities in the right lung particularly in the mid lung  There is a cavitary lesion at the right lung base again noted as well  Workstation performed: [id number]     Procedure: Radiology Results    Result Date: 4/11/2017  Narrative: Ordered by an unspecified provider  Procedure: Cta Ed Chest Pe Study    Result Date: 4/10/2017  Narrative: CTA - CHEST WITH IV CONTRAST - PULMONARY ANGIOGRAM INDICATION: Chest pain  Shortness of breath  Lung cancer patient on chemotherapy COMPARISON: PET CT scan from 3/28/2017 TECHNIQUE: CTA examination of the chest was performed using angiographic technique according to a protocol specifically tailored to evaluate for pulmonary embolism  Reformatted images were created in axial, sagittal, and coronal planes  In addition, coronal 3D MIP postprocessing was performed on the acquisition scanner  Radiation dose length product (DLP) for this visit:  185 mGy-cm   This examination, like all CT scans performed in the [facility], was performed utilizing techniques to minimize radiation dose exposure, including the use of iterative reconstruction and automated exposure control  IV Contrast:  85 mL of iodixanol (VISIPAQUE)           FINDINGS: PULMONARY ARTERIAL TREE:  No pulmonary embolus is seen   LUNGS:  There is severe bullous centrilobular emphysema bilaterally right greater than left  There is also significant finding of cavitated lesion in the right lung base which is similar in appearance to prior studies  Diameter of the cavity is 3 6 cm on today's study, previously up to 4 8 cm  There is an air-fluid level within the cavity  This could be necrotic tumor or an infected bulla  There is surrounding parenchymal consolidation  There is also peripheral parenchymal consolidation in the lateral aspect of the right upper lobe which is moderately significant and is new since the previous exam   There are areas of right lower lobe bronchial obstruction which might be mucous plugging  The left-sided airways appear clear  No acute findings  in the left lung  There is scarring at the apex  PLEURA:  There is a small right pleural effusion which is larger than on the prior PET scan  No left-sided effusion  HEART/AORTA:  Coronary calcification noted  Heart size normal   Aorta intact  MEDIASTINUM AND TRIPP:  There is some right hilar adenopathy  There is also borderline prominent AP window lymph node  AP window lymph node appears slightly more prominent than on the previous PET scan  Current estimated size 10 x 15 mm  CHEST WALL AND LOWER NECK:  Unremarkable  VISUALIZED STRUCTURES IN THE UPPER ABDOMEN:  The included portion of the spleen seems large  No acute upper abdominal pathology visible  A portion of an aortic stent is seen  OSSEOUS STRUCTURES:  There is degenerative arthritis of the right shoulder joint  Impression: No evidence of pulmonary emboli  Slightly increased size of right pleural effusion compared with prior PET scan and significant new areas of right lung consolidation presumably representing pneumonia in the upper lobe  Slight interval decrease in the size of the cavitary lesion at the right base with an air-fluid level noted   Severe centrilobular bullous emphysema bilaterally Stable to perhaps slightly increased size of an AP window lymph node and mild stable right hilar adenopathy  The spleen appears large ##imslh##imslh    Workstation performed: [id number]        Recent Cultures (last 7 days):       Results from last 7 days  Lab Units 04/11/17  0030   LEGIONELLA URINARY ANTIGEN  Negative       Last 24 Hours Medication List:     budesonide-formoterol 2 puff Inhalation BID   calcium carbonate 1 tablet Oral Daily With Breakfast   cefepime 2,000 mg Intravenous Q12H   ferrous sulfate 325 mg Oral Daily With Breakfast   furosemide 40 mg Oral Daily   guaiFENesin 600 mg Oral BID   heparin (porcine) 5,000 Units Subcutaneous Q8H [address]   multivitamin-minerals 1 tablet Oral Daily   predniSONE 60 mg Oral Daily        Today, Patient Was Seen By: Emilio Sanon MD    ** Please Note: Dragon 360 Dictation voice to text software may have been used in the creation of this document   **

## 2017-04-11 NOTE — PLAN OF CARE
Problem: PHYSICAL THERAPY ADULT  Goal: Performs mobility at highest level of function for planned discharge setting  See evaluation for individualized goals  Treatment/Interventions: Functional transfer training, LE strengthening/ROM, Elevations, Therapeutic exercise, Endurance training, Patient/family training, Equipment eval/education, Bed mobility, Gait training, Spoke to nursing, OT  Equipment Recommended: (TBD)      See flowsheet documentation for full assessment, interventions and recommendations  Prognosis: Good  Problem List: Decreased strength, Decreased range of motion, Decreased endurance, Impaired balance, Decreased mobility  Assessment: Pt is a 61year old female c acute hypoxemic respiratory failure 2* SOB for x3-4 days c SpO2 at 70%  Personal factors that impact mobility are: no home O2, hx of squamous cell lung cancer + currently on chemotherapy, 2 ULISES her home + full flight to main living area, amb c no AD + hx of COPD + pneumonia  Presents c decreased strength, ROM + activity tolerance as well as desaturates quickly on room air  Performed bed mobility (I)  Performed sit<>stand transfers c (S) c increased time  Pt on 2-3L of O2 throughout session  SpO2 on EOB in beginning of session on 2 L O2: 99%  Amb a total of 250' c no AD c (S) c one standing rest break at 140' c SpO2 at 91-92% on 2 L O2  Required VCs for lateral sway + breathing techniques during amb  After amb in the chair on the transfer from O2 tank > room O2 pt desat quickly to 80-83% on room air  Pt recovered in 5 mins to 90% on 2 L O2  Nsg notified + noted to raise O2 to 3L  Due to mentioned personal factors + impairments, PT evaluation of high complexity  Will benefit from skilled PT services to assist c functional mobility, endurance, + O2 line management  Recommended home (I) c assistance from family  At next session, trial [facility] for balance          Recommendation: Home independently (c assistance from family prn)     PT - OK to Discharge: Yes     See flowsheet documentation for full assessment

## 2017-04-11 NOTE — PLAN OF CARE
Problem: OCCUPATIONAL THERAPY ADULT  Goal: Performs self-care activities at highest level of function for planned discharge setting  See evaluation for individualized goals  Treatment Interventions: ADL retraining, Functional transfer training, UE strengthening/ROM, Energy conservation, Activityengagement, Equipment evaluation/education, Endurance training        See flowsheet documentation for full assessment, interventions and recommendations  Limitation: Decreased ADL status, Decreased endurance, Decreased self-care trans, Decreased high-level ADLs (increased shortness of breath)  Prognosis: Good  Assessment: Pt is a 61 y o  female seen for OT evaluation s/p admit to [facility] 4[facility]10/2017 w/ Acute hypoxemic respiratory failure, pneumonia  Comorbidities affecting pt's functional performance at time of assessment include: COPD, squamous cell lung CA, phillips's neuroma  Pt now new to 2L O2 via NC continuous  Prior to admission, pt was living w/  and son and was independent w/ ADLS, IADLs, functional transfers and mobility w/ no AD, and driving  Upon evaluation: Pt requires Supervision for functional transfers and mobility, Supervision toileting and LB ADLS, MOD I UB ADLS, independent bed mobility 2* the following deficits impacting occupational performance: decreased endurance, impaired activity tolerance, increased shortness of breath w/ activity (SPO2 on 2L at beginning of session 99%, during mobility 92%, while transitioning from tank to O2 in room 80% w/ 5 minutes and proper breathing to return to 90%; RN aware and increased to 3L O2)  Pt to benefit from continued skilled OT tx while in the hospital to address deficits as defined above and maximize level of functional independence w ADL's and functional mobility  Occupational Performance areas to address include: energy conservation education during ADLs, IADLs; training w/ O2 management during IADLs, safety education   Pt educated on pacing self during tasks and taking rest breaks and pt receptive  From OT standpoint, recommendation at time of d/c would be home w/ increased family support       Discharge Recommendation: Home with family support           Comments:   Ana Mata MS, OTR/L

## 2017-04-11 NOTE — SOCIAL WORK
CM met with patient and her   They live in a 3 story house  NO DME, No advanced directives No Hx of VNA  Patient drives; independent with ADLS  PCP Dr Ann Marie Juarez  No d/c needs known

## 2017-04-11 NOTE — OCCUPATIONAL THERAPY NOTE
[address] Evaluation     Patient Name: Troy Bird  KCOWD'H Date: 4/11/2017  Problem List  Patient Active Problem List   Diagnosis    Juarez's neuroma    Pneumonia    Acute hypoxemic respiratory failure    Acute exacerbation of chronic obstructive pulmonary disease (COPD)    Tobacco abuse    COPD (chronic obstructive pulmonary disease)    Acute respiratory insufficiency    Lung mass    Abdominal aortic aneurysm (AAA) greater than 39 mm in diameter    Squamous cell lung cancer    Renal artery stenosis     Past Medical History  Past Medical History:   Diagnosis Date    COPD (chronic obstructive pulmonary disease)     Juarez's neuroma     Renal artery stenosis     Left    Squamous cell lung cancer     Stage IIB-IIIa, Completed Taxotere and carboplatin with radiation therapy in February 2017     Past Surgical History  Past Surgical History:   Procedure Laterality Date    BRONCHOSCOPY N/A 10/13/2016    Procedure: Geroldine January;  Surgeon: Michael Acosta MD;  [facility];  Service:    Tomie Coop FOOT SURGERY Bilateral     [place] AAA REPAIR,SAVANNA FOSTER,2-DOCK N/A 10/26/2016    Procedure: REPAIR ANEURYSM ENDOVASCULAR ABDOMINAL AORTIC  (EVAR);   Surgeon: Fabiola Sullivan MD;  [facility];  Service: Vascular    RI BRONCHOSCOPY NEEDLE BX TRACHEA MAIN STEM&/BRON N/A 10/13/2016    Procedure: EBUS; FROZEN SECTION ;  Surgeon: Michael Acosta MD;  [facility];  Service: Thoracic    TUBAL LIGATION             04/11/17 0834   Note Type   Note type Eval/Treat   Restrictions/Precautions   Weight Bearing Precautions No   Other Precautions O2;Fall Risk   Pain Assessment   Pain Assessment No/denies pain   Pain Score No Pain   Home Living   Type of [facility] Multi-level;Stairs to enter with rails;Bed/bath upstairs  (2 ULISES;)   Bathroom Shower/Tub Tub/shower unit   Bathroom Toilet Standard   Bathroom Equipment (no DME)   [address] Equipment (no DME)   Additional Comments Pt ambulating w/ no AD, driving prior   Prior Function   Level of [place] Independent with ADLs and functional mobility   Lives With Spouse; Son   Devin Help From Family   ADL Assistance Independent   IADLs Independent   Falls in the last 6 months 0   Vocational Retired   Comments Pt lives w/   and son who do the heavy IADLS   Lifestyle   Autonomy independent ADLS, IADLs, driving   Reciprocal Relationships son, spouse   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   Intrinsic Gratification walking, reading, watching tv   Psychosocial   Psychosocial ([address]) WDL   ADL   Where Assessed Chair   Eating Assistance 6  Modified independent   [address] 6  Modified Independent   [address] 6  [address] 5  [address]  6  Modified independent   [facility] 5  [address]  5  [address] [facility] 5  Supervision/Setup   Additional Comments Pt supervision setup for LB ADLs; needs further education on EC   Bed Mobility   Supine to Sit 7  Independent   Additional Comments pt seated in bedside chair at end of session   Transfers   Sit to Stand 5  Supervision   Additional items Assist x 1; Increased time required   Stand to Sit 5  Supervision   Additional items Increased time required;Verbal cues  (for UE positioning)   Functional Mobility   Functional Mobility 5  Supervision   Additional Comments (unsteady at times)   Additional items (no DME)   Balance   Static Sitting Good   Dynamic Sitting Fair +   Static Standing Fair   Dynamic Standing Fair -   Ambulatory Fair -   Activity Tolerance   Activity Tolerance Patient limited by fatigue;Treatment limited secondary to medical complications (Comment)  (shortness of breath)   Nurse Made Aware appropriate to see per Usman MEDINA Assessment   RUE Assessment WFL  (4/5)   LUE Assessment LUE Assessment WFL  (4/5)   Hand Function   Gross Motor Coordination Functional   Fine Motor Coordination Functional   Sensation   Light Touch No apparent deficits   Sharp/Dull No apparent deficits   Stereognosis No apparent deficits   Proprioception   Proprioception No apparent deficits   Vision-Basic Assessment   Current Vision Wears glasses all the time   Vision - Complex Assessment   Ocular Range of Motion [facility]   Perception   Inattention/Neglect Appears intact   Cognition   Overall Cognitive Status WFL   Arousal/Participation Alert; Cooperative   Attention Within functional limits   Orientation Level Oriented X4   Memory Within functional limits   Following Commands Follows all commands and directions without difficulty   Comments Pt engages in appropriate conversations   Assessment   Limitation Decreased ADL status; Decreased endurance;Decreased self-care trans;Decreased high-level ADLs  (increased shortness of breath)   Prognosis Good   Assessment Pt is a 61 y o  female seen for OT evaluation s/p admit to [facility] on 4/10/2017 w/ Acute hypoxemic respiratory failure, pneumonia  Comorbidities affecting pt's functional performance at time of assessment include: COPD, squamous cell lung CA, phillips's neuroma  Pt now new to 2L O2 via NC continuous  Prior to admission, pt was living w/  and son and was independent w/ ADLS, IADLs, functional transfers and mobility w/ no AD, and driving  Upon evaluation: Pt requires Supervision for functional transfers and mobility, Supervision toileting and LB ADLS, MOD I UB ADLS, independent bed mobility 2* the following deficits impacting occupational performance: decreased endurance, impaired activity tolerance, increased shortness of breath w/ activity (SPO2 on 2L at beginning of session 99%, during mobility 92%, while transitioning from tank to O2 in room 80% w/ 5 minutes and proper breathing to return to 90%; RN aware and increased to 3L O2)   Pt to benefit from continued skilled OT tx while in the hospital to address deficits as defined above and maximize level of functional independence w ADL's and functional mobility  Occupational Performance areas to address include: energy conservation education during ADLs, IADLs; training w/ O2 management during IADLs, safety education  Pt educated on pacing self during tasks and taking rest breaks and pt receptive  From OT standpoint, recommendation at time of d/c would be home w/ increased family support  Goals   Patient Goals go home   LTG Time Frame 7-10   Long Term Goal please see below goals   Plan   Treatment Interventions ADL retraining;Functional transfer training;UE strengthening/ROM; Energy conservation; Activityengagement;Equipment evaluation/education; Endurance training   Goal Expiration Date 04/21/17   OT Frequency 3-5x/wk   Recommendation   Discharge Recommendation Home with family support   Barthel Index   Feeding 10   Bathing 5   Grooming Score 5   Dressing Score 5   Bladder Score 10   Bowels Score 10   Toilet Use Score 5   Transfers (Bed/Chair) Score 10   Mobility (Level Surface) Score 10   Stairs Score 0   Barthel Index Score 70   Modified Yung Scale   Modified Yung Scale 4     Occupational Therapy Goals to be met in 7-10 days:  1) Pt will improve activity tolerance to G for min 30 min txment sessions  2) Pt will complete ADLs/self care w/ mod I   3) Pt will complete toileting w/ mod I w/ G hygiene/thoroughness using DME PRN  4) Pt will improve fx'l tfers on/off all surfaces using DME PRN w/ G balance/safety including toileting w/ mod I  5) Pt will improve fx'l mobility during I/ADl/leisure tasks using DME PRN w/ g balance/safety w/ mod I  6) Pt will demonstrate 100% carryover of E C  techniques w/ mod I t/o fx'l I/ADL/leisure tasks w/o cues s/p skilled education  7)Pt will demonstrate simulated IADLs safely at MOD I level    8)Pt will demonstrate safe management of O2 tubing during functional tasks      Documentation completed by: Charles Welch MS, OTR/L

## 2017-04-11 NOTE — PHYSICAL THERAPY NOTE
Physical Therapy Evaluation    Patient's Name: Millicent Aquino    Admitting Diagnosis  Respiratory insufficiency [R06 89]  Pneumonia [J18 9]  SOB (shortness of breath) [R06 02]  Squamous cell lung cancer [C34 90]  Acute hypoxemic respiratory failure [J96 01]    Problem List  Patient Active Problem List   Diagnosis    Yohan neuroma    Pneumonia    Acute hypoxemic respiratory failure    Acute exacerbation of chronic obstructive pulmonary disease (COPD)    Tobacco abuse    COPD (chronic obstructive pulmonary disease)    Acute respiratory insufficiency    Lung mass    Abdominal aortic aneurysm (AAA) greater than 39 mm in diameter    Squamous cell lung cancer    Renal artery stenosis       Past Medical History  Past Medical History:   Diagnosis Date    COPD (chronic obstructive pulmonary disease)     Larry's neuroma     Renal artery stenosis     Left    Squamous cell lung cancer     Stage IIB-IIIa, Completed Taxotere and carboplatin with radiation therapy in February 2017       Past Surgical History  Past Surgical History:   Procedure Laterality Date    BRONCHOSCOPY N/A 10/13/2016    Procedure: Katherine Santos;  Surgeon: Basilia Ribeiro MD;  [facility];  Service:    Mavis Cousin FOOT SURGERY Bilateral     [place] AAA REPAIR,SAVANNA FOSTER,2-DOCK N/A 10/26/2016    Procedure: REPAIR ANEURYSM ENDOVASCULAR ABDOMINAL AORTIC  (EVAR); Surgeon: Daron Barcenas MD;  [facility];  Service: Vascular    [place] BRONCHOSCOPY NEEDLE BX TRACHEA MAIN STEM&/BRON N/A 10/13/2016    Procedure: EBUS; FROZEN SECTION ;  Surgeon: Basilia Ribeiro MD;  [facility];  Service: Thoracic    TUBAL LIGATION        04/11/17 0839   Note Type   Note type Eval only   Pain Assessment   Pain Assessment No/denies pain   Pain Score No Pain   Home Living   Type of Home House  (3 story home)   Home Layout Multi-level;Stairs to enter with rails; Able to live on main level with bedroom/bathroom  (2 ULISES c rail, full flight to main level)   Bathroom Equipment (No DME)   Home Equipment (No DME)   Additional Comments (+) drives, no home O2   Prior Function   Level of [place] Independent with ADLs and functional mobility   Lives With Spouse;Son  (able to assist prn)   Receives Help From Family   ADL Assistance Independent   IADLs Independent   Falls in the last 6 months 0   Vocational Retired   Restrictions/Precautions   [facility] Bearing Precautions No   Other Precautions O2;Fall Risk  (2-3L throughout session)   General   Additional Pertinent History Pt [facility] to [facility] c acute hypoxemic respiratory failure 2* SOB for x3-4 days c SpO2 70%  PT consult  Up c assistance  Family/Caregiver Present No   Cognition   Overall Cognitive Status WFL   Orientation Level Oriented X4   Following Commands Follows all commands and directions without difficulty   RUE Assessment   RUE Assessment WFL  (Grossly 4/5)   LUE Assessment   LUE Assessment WFL  (Grossly 4/5)   RLE Assessment   RLE Assessment WFL  (Grossly 4/5)   LLE Assessment   LLE Assessment WFL  (Grossly 4/5)   Coordination   Sensation WFL   Light Touch   RLE Light Touch Grossly intact   LLE Light Touch Grossly intact   Bed Mobility   Rolling L 7  Independent   Supine to Sit 7  Independent   Transfers   Sit to Stand 5  Supervision   Additional items Assist x 1; Increased time required   Stand to Sit 5  Supervision   Additional items Assist x 1; Increased time required   Ambulation/Elevation   Gait pattern Inconsistent bobby; Short stride  (Lateral sway)   Gait Assistance 5  Supervision   Additional items Assist x 1;Verbal cues  (VCs for lateral sway + breathing techniques)   Assistive Device None   Distance 140' c standing rest + 110'    Stair Management Assistance Not tested   Balance   Static Sitting Good   Dynamic Sitting Fair +   Static Standing Fair   Dynamic Standing Fair -   Ambulatory Fair -   Endurance Deficit   Endurance Deficit Yes   Endurance Deficit Description SpO2 throughout session: 80-99% on 2 L O2  SpO2 at beginning of session: 99%; SpO2 after amb 140' c no AD on 2 L O2: 91-92%, SpO2 after transfer of 2 L O2 tank > 2L of room O2: 80-83%, SpO2 after 5 mins in chair on 2 L: 90%  Nsg notified  Nsg request O2 increased to 3L + will montior SpO2 in chair  Activity Tolerance   Activity Tolerance Patient limited by fatigue   Nurse Made Aware Magy   Assessment   Prognosis Good   Problem List Decreased strength;Decreased range of motion;Decreased endurance; Impaired balance;Decreased mobility   Assessment Pt is a 61year old female c acute hypoxemic respiratory failure 2* SOB for x3-4 days c SpO2 at 70%  Personal factors that impact mobility are: no home O2, hx of squamous cell lung cancer + currently on chemotherapy, 2 ULISES her home + full flight to main living area, amb c no AD + hx of COPD + pneumonia  Presents c decreased strength, ROM + activity tolerance as well as desaturates quickly on room air  Performed bed mobility (I)  Performed sit<>stand transfers c (S) c increased time  Pt on 2-3L of O2 throughout session  SpO2 on EOB in beginning of session on 2 L O2: 99%  Amb a total of 250' c no AD c (S) c one standing rest break at 140' c SpO2 at 91-92% on 2 L O2  Required VCs for lateral sway + breathing techniques during amb  After amb in the chair on the transfer from O2 tank > room O2 pt desat quickly to 80-83% on room air  Pt recovered in 5 mins to 90% on 2 L O2  Nsg notified + noted to raise O2 to 3L  Due to mentioned personal factors + impairments, PT evaluation of high complexity  Will benefit from skilled PT services to assist c functional mobility, endurance, + O2 line management  Recommended home (I) c assistance from family  At next session, trial [facility] for balance     Goals   Patient Goals to go home   STG Expiration Date 04/16/17   Short Term Goal #1 1) increase strength by 1/2 grades to assist c functional mobility 2) perform transfers (I) 3) amb 300' c LRAD c mod(I)/(I) c no lateral sway + SpO2 > 94% 4) negotiate a full flight of stairs (I) c SpO2 > 94% 5) score a 90 on the barthel index to demonstrate improvments c mobility   Treatment Day 0   Plan   Treatment/Interventions Functional transfer training;LE strengthening/ROM; Elevations; Therapeutic exercise; Endurance training;Patient/family training;Equipment eval/education; Bed mobility;Gait training;Spoke to nursing;OT   PT Frequency 5x/wk   Recommendation   Recommendation Home independently  (c assistance from family prn)   Equipment Recommended (TBD)   PT - OK to Discharge Yes   Barthel Index   Feeding 10   Bathing 5   Grooming Score 0   Dressing Score 5   Bladder Score 10   Bowels Score 10   Toilet Use Score 5   Transfers (Bed/Chair) Score 10   Mobility (Level Surface) Score 10   Stairs Score 0   Barthel Index Score 65           Bridgette Evans, SPT

## 2017-04-11 NOTE — CASE MANAGEMENT
Initial Clinical Review    Admission: Date/Time/Statement: 4/10/17 @ 1708     Orders Placed This Encounter   Procedures    Inpatient Admission (expected length of stay for this patient is greater than two midnights)     Standing Status:   Standing     Number of Occurrences:   1     Order Specific Question:   Admitting Physician     Answer:   Skyler Dunlap [[id number]]     Order Specific Question:   Level of Care     Answer:   Med Surg [[id number]]     Order Specific Question:   Estimated length of stay     Answer:   More than 2 Midnights     Order Specific Question:   Certification     Answer:   I certify that inpatient services are medically necessary for this patient for a duration of greater than two midnights  See H&P and MD Progress Notes for additional information about the patient's course of treatment  ED: Date/Time/Mode of Arrival:   ED Arrival Information     Expected Arrival Acuity Means of Arrival Escorted By Service Admission Type    - 4/10/2017 12:11 Emergent Walk-In Self General Medicine Emergency    Arrival Complaint    Shortness of Breath           Chief Complaint:   Chief Complaint   Patient presents with    Breathing Difficulty     pt states ongoing for the last 4-5 days  hx of lung cancer  states checks own oxygen level at home and it has been running low  recent appt with oncologist and states it was okay  denies CP  History of Illness:   61year old female with significant PMH of Squamous cell carcinoma of the right lung, COPD and history of Pneumonia with ARF presents with difficulty breathing  She has been short of breath for approximately 4-5 days  She does have a pulse oximeter which she checks her oxygen saturation with at home and noted it to be 73%   She feels especially bad     ED Vital Signs:   ED Triage Vitals   Temperature Pulse Respirations Blood Pressure SpO2   04/10/17 1216 04/10/17 1216 04/10/17 1216 04/10/17 1216 04/10/17 1216   97 9 °F (36 6 °C) 107 18 105/57 86 % Temp Source Heart Rate Source Patient Position BP Location FiO2 (%)   04/10/17 1216 04/10/17 1442 04/10/17 1442 04/10/17 1442 --   Temporal Monitor Lying Right arm       Pain Score       04/10/17 1216       No Pain        Wt Readings from Last 1 Encounters:   04/10/17 58 5 kg (128 lb 15 5 oz)       Abnormal Labs/Diagnostic Test Results:  NT-proBNP 1213,   K 3 0,   Cl 99,   Cr 1 32,   T pro 8 3,   Alb 2 0,  Wbc's 4 05,   H/h 8 8 / 28 6,   Abs monos 0 14    CXR:  Similar extensive interstitial and alveolar densities in the right lung particularly in the mid lung   There is a cavitary lesion at the right lung base again noted as well  CTA Chest:  No evidence of pulmonary emboli  Slightly increased size of right pleural effusion compared with prior PET scan and significant new areas of right lung consolidation presumably representing pneumonia in the upper lobe  Slight interval decrease in the size of the cavitary lesion at the right base with an air-fluid level noted  Severe centrilobular bullous emphysema bilaterally    Stable to perhaps slightly increased size of an AP window lymph node and mild stable right hilar adenopathy  The spleen appears large    EKG:  NSR    ED Treatment:   Medication Administration from 04/10/2017 1211 to 04/10/2017 1935       Date/Time Order Dose Route Action Action by Comments     04/10/2017 1603 sodium chloride 0 9 % infusion 125 mL/hr Intravenous 2225 [facility] Alexei Garrett RN      04/10/2017 1643 iodixanol (VISIPAQUE) 320 MG/ML injection 85 mL 85 mL Intravenous Given Kaleb Hall         O2 at 2 liters  NC    Past Medical/Surgical History:    Active Ambulatory Problems     Diagnosis Date Noted    Juarez's neuroma     Pneumonia 03/03/2016    Acute hypoxemic respiratory failure 03/07/2016    Acute exacerbation of chronic obstructive pulmonary disease (COPD) 03/07/2016    Tobacco abuse     COPD (chronic obstructive pulmonary disease)     Acute respiratory insufficiency 08/09/2016    Lung mass 08/09/2016    Abdominal aortic aneurysm (AAA) greater than 39 mm in diameter 10/26/2016     Resolved Ambulatory Problems     Diagnosis Date Noted    No Resolved Ambulatory Problems     Past Medical History:   Diagnosis Date    Asthma     COPD (chronic obstructive pulmonary disease)     Juarez's neuroma     Renal artery stenosis     Squamous cell lung cancer     Tobacco abuse        Admitting Diagnosis: Respiratory insufficiency [R06 89]  Pneumonia [J18 9]  SOB (shortness of breath) [R06 02]  Squamous cell lung cancer [C34 90]  Acute hypoxemic respiratory failure [J96 01]    Age/Sex: 61 y o  female    Assessment/Plan:  Patient is a very pleasant 30-year-old female with squamous cell lung cancer currently undergoing radiation and chemotherapy  The patient presents with worsening shortness of breath at ×3-4 days  The patient says that she has a pulse oximeter at home and she checked her oxygen levels and noted it was in the 70s and she came to the ER for evaluation  In the ER the patient was noted to be hypoxic with oxygen level at 86%  She was initially afebrile but she did spike a temperature to 100 5  The patient had a mildly decreased white blood cell count of 4 05, hemoglobin of 8 8  Other notable labs were a creatinine of 1 32 and a potassium of 3 0  Patient proceeded to get a chest x-ray showed similar extensive interstitial and aVL are densities in the right lung  The patient proceeded to get a CT of the chest given the hypoxia and history of lung cancer which fortunately did not show any pulmonary embolism but did show significant new areas of right lung consolidation representing pneumonia in the upper lobe   Of note there was also a slightly increased size as the right pleural effusion and decreased size in the cavitary lesion     Hospital Problem List:   Principal Problem:  Acute hypoxemic respiratory failure  Active Problems:  Pneumonia  Tobacco abuse  COPD (chronic obstructive pulmonary disease)  Abdominal aortic aneurysm (AAA) greater than 39 mm in diameter  Squamous cell lung cancer  Renal artery stenosis    Plan for the Primary Problem(s):  1  Acute hypoxic respiratory failure secondary to pneumonia/concern for pseudomonasupon further questioning the patient tells me that she did get an x-ray approximately 10 days ago by her primary care doctor  It was noted that there was worsening opacification of the right hemithorax and her primary care doctor did place her on an antibiotic  She does not know the name of it she claims that she was on it for 10 days  Presumably failed outpatient therapy and or concern for pseudomonas given her immunocompromised state and currently getting chemotherapy  Will check the usual respiratory antigens  Check blood cultures prior to the start of antibiotics of cefepime and Zithromax  Continue oxygen and initiate respiratory protocol and add Mucinex  The patient is known to Dr Esther Duarte of [place] [address] [facility]  We will kindly asked pulmonology to see the patient in consultation      Plan for Additional Problems:   2  COPD/emphysemano acute exacerbation  The patient is on Symbicort  Please see #1   3  Squamous cell lung cancerwith decrease in the mass  Patient follows with Dr Laureen Rodriguez as well as Dr Blanquita Ellis of radiation oncology  4  Renal insufficiencythe patient's creatinine is 1 3 her baseline is approximately one  The patient is on Lasix  Given the pleural effusion  , continue the home dose of Lasix  Give consideration to possible IV diuresis  Monitor creatinine  5  Hypokalemia3 0  We'll provide K Dur 40 mEq  6  Anemia likely secondary to cancer, patient is stable  She is also taking iron supplementation  Monitor     VTE Prophylaxis: Heparin   Code Status: Full code  Anticipated Length of Stay: Patient will be admitted on an Inpatient basis with an anticipated length of stay of Greater than 2 midnights       Admission Orders:    Consult Pulm  COnsult PT / OT  BS's pending  Urine Legionella and strep pneumoniae,  CBC,  BMP    Scheduled Meds:   cefepime 2,000 mg Intravenous Q12H   And      azithromycin 500 mg Oral Q24H   budesonide-formoterol 2 puff Inhalation BID   calcium carbonate 1 tablet Oral Daily With Breakfast   ferrous sulfate 325 mg Oral Daily With Breakfast   furosemide 40 mg Oral Daily   guaiFENesin 600 mg Oral BID   heparin (porcine) 5,000 Units Subcutaneous Q8H [address]   multivitamin-minerals 1 tablet Oral Daily     Continuous Infusions:    PRN Meds:   aluminum-magnesium hydroxide-simethicone    levalbuterol    ondansetron    sodium chloride    4/11:  100 9 tymp - 93 - 20   109/58  O2 at 2 liters  95%      [facility] Utilization Review Support Staff  Main Number:  [phone] Main Fax: [phone] Cora NAVAS 's fax: [phone]  Phone UR Staff Title Assignments Facilities   Opt  1 Agustina YOUNG [place] Medical Necessity Denials All Facilities   Opt  2 Diadina F  UR Asst Mom/Baby Admits & Denials [place]   Opt  3 Opt  1 Diadina F  UR Asst PT  Last Name A-M & OBS Admits [place]   Opt  3 Opt  [address] [place] Asst PT  Last Name N-Z [facility]  4 Tete H  UR Asst Medical Patients [place]   Opt  5 Enrique Alicia  UR Asst Medical Patients [place]   Opt  [address] [facility] [facility] Asst Addt  Clinicals/ Medical Admin  Denials All Facilities   [phone] Zhang Shingles UR Asst Medical Patients [place]      Please call with any questions or concerns [phone] - Confidential Voicemail

## 2017-04-11 NOTE — CONSULTS
Consultation - Pulmonary Medicine   Nicolas Arreola 61 y o  female MRN: [id number]  Unit/Bed#: E4 -01 Encounter: [id number]      Physician Requesting Consult: Dr Collins Almendarez  Reason for Consult: Hypoxia, pneumonia, lung cancer    Assessment/Plan:  1  Acute hypoxic respiratory failure  · Continue supplemental oxygen  · Suspect she will need supplemental oxygen at discharge  2  Lung cancer status post chemotherapy and radiation, completed in February  · Followed by Dr Rivera Love  Was anticipated to undergo additional 2 cycles of Taxotere and carboplatin, currently on hold  3  Pleural effusion, suspect inflammatory, less likely malignant although she is at risk for malignant effusion  4  Severe emphysema  · Followed by Dr Melanie Pinedo  · Continue Symbicort  · Outpatient follow-up after discharge with him  5  Abnormal CAT scan, highly suspicious for Radiation pneumonitis  · Findings on imaging are more suggestive of radiation pneumonitis  I doubt clinically if there is bacterial pneumonia  She does not have fever, purulent phlegm, or other signs or symptoms of a bacterial infectious process  · Would add steroids, prednisone ordered at 1 mg/kg daily  · Reasonable to continue cefepime at this point, discontinue atypical coverage  Patient is at risk for pseudomonas given the structural lung disease  6  Anemia, mild pancytopenia, chemotherapy associated  · The anemia is likely contributing fairly significantly to the exertional shortness of breath complaints    ______________________________________________________________________    HPI:    Nicolas Arreola is a 61 y o  female who presents with increasing shortness of breath over the last several weeks  She was seen by her oncologist, radiation oncology, and Dr Melanie Pinedo within the recent past  Her shortness of breath was treated with Symbicort but did not improve very much  She was also treated with a course of antibiotics and again did not have much improvement  Respiratory shortness of breath, particularly with exertion  She did not have significant rest symptoms  She denied any chest pain  She did not have any pleurisy  He feels well  She is requiring a significant amount of supplemental oxygen however  She is afebrile  She does have a cough which has been predominantly dry but when she produces phlegm has been whitish and nonpurulent  She has not had hemoptysis  She has had significant anemia related to her chemotherapy which has also contributed to her symptoms  She has no personal history of congestive heart failure but did have some swelling in her lower extremities a couple of weeks ago which has since improved  She denies any calf tenderness  She has not had any history of pulmonary embolism  She denies headache or visual change  She has not had sore throat or postnasal drip  She denies any esophageal reflux symptoms or abdominal pain  She has not had any increasing arthralgias or myalgias  She denies dysuria  Review of Systems:    Full 14 point review of systems was performed  Aside from what was mentioned in the HPI, it is otherwise negative  Historical Information   Past Medical History:   Diagnosis Date    COPD (chronic obstructive pulmonary disease)     Juarez's neuroma     Renal artery stenosis     Left    Squamous cell lung cancer     Completed Taxotere and carboplatin with radiation therapy in February 2017     Past Surgical History:   Procedure Laterality Date    BRONCHOSCOPY N/A 10/13/2016    Procedure: Aslhey Hartley;  Surgeon: Glori Fothergill, MD;  [facility];  Service:    Leticia  FOOT SURGERY Bilateral     NM AAA REPAIR,DAPHNER SMITA FOSTER,2-DOCK N/A 10/26/2016    Procedure: REPAIR ANEURYSM ENDOVASCULAR ABDOMINAL AORTIC  (EVAR);   Surgeon: Nubia Pickard MD;  [facility];  Service: Vascular    NM BRONCHOSCOPY NEEDLE BX TRACHEA MAIN STEM&/BRON N/A 10/13/2016    Procedure: EBUS; FROZEN SECTION ;  Surgeon: Glori Fothergill, MD; Location[facility];  Service: Thoracic    TUBAL LIGATION       Social History   History   Alcohol Use    Yes     Comment: rarely     History   Smoking Status    Former Smoker    Packs/day: 2 00    Years: 35 00    Types: Cigarettes    Quit date: 12/3/2007   Smokeless Tobacco    Not on file       Family History:   Family History   Problem Relation Age of Onset    Brain cancer Mother     Kidney failure Father        Medications: The patient's active and prehospital medications were reviewed  budesonide-formoterol 2 puff Inhalation BID   calcium carbonate 1 tablet Oral Daily With Breakfast   cefepime 2,000 mg Intravenous Q12H   ferrous sulfate 325 mg Oral Daily With Breakfast   furosemide 40 mg Oral Daily   guaiFENesin 600 mg Oral BID   heparin (porcine) 5,000 Units Subcutaneous Q8H [address]   multivitamin-minerals 1 tablet Oral Daily   predniSONE 60 mg Oral Daily         PhysicalExamination:  Vitals:   Vitals:    04/11/17 0801 04/11/17 0900 04/11/17 1010 04/11/17 1049   BP: 109/58      Pulse: 93      Resp: 20      Temp: 100 5 °F (38 1 °C) (!) 100 9 °F (38 3 °C) (!) 101 °F (38 3 °C) 100 °F (37 8 °C)   TempSrc: Tympanic Tympanic Tympanic Tympanic   SpO2: 95%      Weight:       Height:         Temp  Min: 97 9 °F (36 6 °C)  Max: 101 °F (38 3 °C)  IBW: 59 3 kg    SpO2: 95 %,   SpO2 Activity: At Rest,   O2 Device: Nasal cannula    Gen: Moderate pallor  Pupils are reactive  HEENT: Pupils are equal and reactive  Oropharynx is moist   Neck: Supple, no JVD, no lymphadenopathy, trachea midline  Chest: Distant breath sounds with a few crackles at the right base  Mild dullness to percussion on the right base but hyperresonant throughout the remaining lung fields    Cardiac: Regular, no murmur  Abd: Soft, benign  Ext: Trace edema  Neuro: Awake alert and nonfocal   Skin: No rash    Diagnostic Data:  CBC:     Results from last 7 days  Lab Units 04/11/17  0502 04/10/17  1441   WBC Thousand/uL 3 89* 4 05*   HEMOGLOBIN g/dL 7  6* 8 8*   HEMATOCRIT % 24 6* 28 6*   PLATELETS Thousands/uL 129* 164       CMP:     Results from last 7 days  Lab Units 04/11/17  0502 04/10/17  1441 04/05/17  0846   SODIUM mmol/L 140 138 136   POTASSIUM mmol/L 4 0 3 0* 3 8   CHLORIDE mmol/L 105 99* 97*   CO2 mmol/L 27 30 30   BUN mg/dL 16 20 18   CREATININE mg/dL 1 09 1 32* 1 45*   CALCIUM mg/dL 7 9* 9 0 9 0   TOTAL PROTEIN g/dL  --  8 3*  --    BILIRUBIN TOTAL mg/dL  --  0 46  --    ALK PHOS U/L  --  77  --    ALT U/L  --  32  --    AST U/L  --  38  --    GLUCOSE RANDOM mg/dL 100 110 126     Microbiology:    Results from last 7 days  Lab Units 04/11/17  0030   STREP PNEUMONIAE ANTIGEN, URINE  Negative   LEGIONELLA URINARY ANTIGEN  Negative     Imaging:  Reviewed her CAT scan of the chest personally on the [facility]  There is a cavity where the primary mass lesion was  There is a small to moderate right pleural effusion  There are consolidative changes throughout the right lung field  There is significant emphysema throughout the remaining tissue  Cardiac lab/EKG/telemetry/ECHO:     Results from last 7 days  Lab Units 04/10/17  1441   TROPONIN I ng/mL <0 02   NT-PRO BNP pg/mL 1213*     Echocardiogram results from March 2016 was reviewed and showed a normal ejection fraction with likely mild/grade 1 diastolic dysfunction  Right ventricular function was normal  There is no significant valvular abnormality  Code Status: Level 1 - Full Code    Christine Haile MD    Portions of the record may have been created with voice recognition software  Occasional wrong word or "sound a like" substitutions may have occurred due to the inherent limitations of voice recognition software  Read the chart carefully and recognize, using context, where substitutions have occurred

## 2017-04-11 NOTE — PLAN OF CARE
DISCHARGE PLANNING     Discharge to home or other facility with appropriate resources Progressing        INFECTION - ADULT     Absence or prevention of progression during hospitalization Progressing     Absence of fever/infection during neutropenic period Progressing        Knowledge Deficit     Patient/family/caregiver demonstrates understanding of disease process, treatment plan, medications, and discharge instructions Progressing        Knowledge Deficit     Patient/family/caregiver demonstrates understanding of disease process, treatment plan, medications, and discharge instructions Progressing        PAIN - ADULT     Verbalizes/displays adequate comfort level or baseline comfort level Progressing        Prexisting or High Potential for Compromised Skin Integrity     Skin integrity is maintained or improved Progressing        SAFETY ADULT     Patient will remain free of falls Progressing     Maintain or return to baseline ADL function Progressing     Maintain or return mobility status to optimal level Progressing

## 2017-04-12 ENCOUNTER — APPOINTMENT (INPATIENT)
Dept: PHYSICAL THERAPY | Facility: HOSPITAL | Age: 67
DRG: 206 | End: 2017-04-12
Payer: COMMERCIAL

## 2017-04-12 LAB
ABO GROUP BLD BPU: NORMAL
ANION GAP SERPL CALCULATED.3IONS-SCNC: 6 MMOL/L (ref 4–13)
BPU ID: NORMAL
BUN SERPL-MCNC: 17 MG/DL (ref 5–25)
CALCIUM SERPL-MCNC: 8.7 MG/DL (ref 8.3–10.1)
CHLORIDE SERPL-SCNC: 105 MMOL/L (ref 100–108)
CO2 SERPL-SCNC: 29 MMOL/L (ref 21–32)
CREAT SERPL-MCNC: 0.99 MG/DL (ref 0.6–1.3)
GFR SERPL CREATININE-BSD FRML MDRD: 56.5 ML/MIN/1.73SQ M
GLUCOSE SERPL-MCNC: 146 MG/DL (ref 65–140)
HCT VFR BLD AUTO: 27.9 % (ref 34.8–46.1)
HGB BLD-MCNC: 8.8 G/DL (ref 11.5–15.4)
POTASSIUM SERPL-SCNC: 4.2 MMOL/L (ref 3.5–5.3)
SODIUM SERPL-SCNC: 140 MMOL/L (ref 136–145)
UNIT DISPENSE STATUS: NORMAL
UNIT PRODUCT CODE: NORMAL
UNIT RH: NORMAL

## 2017-04-12 PROCEDURE — 97116 GAIT TRAINING THERAPY: CPT

## 2017-04-12 PROCEDURE — 97110 THERAPEUTIC EXERCISES: CPT

## 2017-04-12 PROCEDURE — 94760 N-INVAS EAR/PLS OXIMETRY 1: CPT

## 2017-04-12 PROCEDURE — 85018 HEMOGLOBIN: CPT | Performed by: INTERNAL MEDICINE

## 2017-04-12 PROCEDURE — 85014 HEMATOCRIT: CPT | Performed by: INTERNAL MEDICINE

## 2017-04-12 PROCEDURE — 97535 SELF CARE MNGMENT TRAINING: CPT

## 2017-04-12 PROCEDURE — 97530 THERAPEUTIC ACTIVITIES: CPT

## 2017-04-12 PROCEDURE — 80048 BASIC METABOLIC PNL TOTAL CA: CPT | Performed by: INTERNAL MEDICINE

## 2017-04-12 RX ADMIN — Medication 1 TABLET: at 08:56

## 2017-04-12 RX ADMIN — BUDESONIDE AND FORMOTEROL FUMARATE DIHYDRATE 2 PUFF: 160; 4.5 AEROSOL RESPIRATORY (INHALATION) at 17:23

## 2017-04-12 RX ADMIN — PREDNISONE 60 MG: 20 TABLET ORAL at 08:56

## 2017-04-12 RX ADMIN — HEPARIN SODIUM 5000 UNITS: 5000 INJECTION, SOLUTION INTRAVENOUS; SUBCUTANEOUS at 22:46

## 2017-04-12 RX ADMIN — BUDESONIDE AND FORMOTEROL FUMARATE DIHYDRATE 2 PUFF: 160; 4.5 AEROSOL RESPIRATORY (INHALATION) at 08:57

## 2017-04-12 RX ADMIN — GUAIFENESIN 600 MG: 600 TABLET, EXTENDED RELEASE ORAL at 17:23

## 2017-04-12 RX ADMIN — Medication 1 TABLET: at 08:57

## 2017-04-12 RX ADMIN — FERROUS SULFATE TAB 325 MG (65 MG ELEMENTAL FE) 325 MG: 325 (65 FE) TAB at 08:56

## 2017-04-12 RX ADMIN — GUAIFENESIN 600 MG: 600 TABLET, EXTENDED RELEASE ORAL at 08:56

## 2017-04-12 RX ADMIN — HEPARIN SODIUM 5000 UNITS: 5000 INJECTION, SOLUTION INTRAVENOUS; SUBCUTANEOUS at 13:07

## 2017-04-12 RX ADMIN — CEFEPIME HYDROCHLORIDE 2000 MG: 2 INJECTION, POWDER, FOR SOLUTION INTRAVENOUS at 08:57

## 2017-04-12 RX ADMIN — HEPARIN SODIUM 5000 UNITS: 5000 INJECTION, SOLUTION INTRAVENOUS; SUBCUTANEOUS at 05:36

## 2017-04-12 RX ADMIN — FUROSEMIDE 40 MG: 40 TABLET ORAL at 08:56

## 2017-04-12 NOTE — SOCIAL WORK
Aria Dove from insurance call to if assistance is needed with d/c planning her number is [phone]

## 2017-04-12 NOTE — PLAN OF CARE
DISCHARGE PLANNING     Discharge to home or other facility with appropriate resources Progressing        DISCHARGE PLANNING - CARE MANAGEMENT     Discharge to post-acute care or home with appropriate resources Progressing        INFECTION - ADULT     Absence or prevention of progression during hospitalization Progressing     Absence of fever/infection during neutropenic period Progressing        Knowledge Deficit     Patient/family/caregiver demonstrates understanding of disease process, treatment plan, medications, and discharge instructions Progressing        Knowledge Deficit     Patient/family/caregiver demonstrates understanding of disease process, treatment plan, medications, and discharge instructions Progressing        PAIN - ADULT     Verbalizes/displays adequate comfort level or baseline comfort level Progressing        Prexisting or High Potential for Compromised Skin Integrity     Skin integrity is maintained or improved Progressing        SAFETY ADULT     Patient will remain free of falls Progressing     Maintain or return to baseline ADL function Progressing     Maintain or return mobility status to optimal level Progressing

## 2017-04-12 NOTE — PLAN OF CARE
Problem: OCCUPATIONAL THERAPY ADULT  Goal: Performs self-care activities at highest level of function for planned discharge setting  See evaluation for individualized goals  Treatment Interventions: ADL retraining, Functional transfer training, UE strengthening/ROM, Energy conservation, Activityengagement, Equipment evaluation/education, Endurance training        See flowsheet documentation for full assessment, interventions and recommendations  Outcome: Progressing  Limitation: Decreased ADL status, Decreased endurance, Decreased self-care trans, Decreased high-level ADLs (increased shortness of breath)  Prognosis: Good  Assessment: Pt was seen for skilled OT with focus on completion of self care tasks, review of compensatory breathing and energy conservation techs  Pt with limited incite to respiratory deficits  Pt indicated she has a pulse oximeter at home and thats why she came in because she was in the 70's  After completion of functional reach activity, Pt dropped to 88% with in 1 min of sustained useage of BUE  After review of possibly requiring AE for LE dressing and seated positioning with rest breaks for all self care, Pt stated, "I wont use equipment, I'll just be SOB'  Pt may benefit from further pulmonary rehab and a home safety evaluation due to possible need for home O2        Discharge Recommendation:  (Home safety eval/pulmonary rehab  )           Comments:   FREDDIE Courtney

## 2017-04-12 NOTE — PHYSICAL THERAPY NOTE
PHYSICAL THERAPY NOTE          Patient Name: Nicolas Arreola  [id number] Date: 4/12/2017 04/12/17 1610   Pain Assessment   Pain Assessment No/denies pain   Pain Score No Pain   Restrictions/Precautions   Other Precautions O2;Fall Risk   General   Chart Reviewed Yes   Family/Caregiver Present No   Cognition   Overall Cognitive Status WFL   Arousal/Participation Alert   Orientation Level Oriented X4   Following Commands Follows all commands and directions without difficulty   Subjective   Subjective no pain  (second attempt earlier pt walked unit c respiratory therapist  gave her a rest prior to PT session)I go now  thought you forgot me  Bed Mobility   Supine to Sit 6  Modified independent   Additional items Assist x 1   Sit to Supine 6  Modified independent   Additional items Assist x 1   Transfers   Sit to Stand 6  Modified independent   Additional items Assist x 1; Armrests; Bedrails  (number of trials 3 in all for sit rests for stair training,)   Stand to Sit 5  Supervision   Additional items Assist x 1; Armrests; Bedrails  (number of trials  line safety c turns)   Ambulation/Elevation   Gait pattern Decreased foot clearance   Gait Assistance 5  Supervision   Additional items Assist x 1;Verbal cues   Assistive Device None   Distance 250feet twice c sit rest  30 feet twice to from stairs  sit rests  returned to room   Stair Management Assistance 5  Supervision   Additional items Assist x 1;Verbal cues   Stair Management Technique Two rails; One rail R;Alternating pattern; Step to pattern; Foreward   Number of Stairs 16   Balance   Static Sitting Good   Static Standing Fair   Dynamic Standing Fair -   Ambulatory Fair -   Endurance Deficit   Endurance Deficit Yes   Endurance Deficit Description shortness of breath c ascending stairs rests provided   Activity Tolerance   Activity Tolerance Patient limited by fatigue   Nurse Made Aware raoul rn   Exercises   Hip Flexion (omit noted back issues)   Hip Abduction Sitting;15 reps;AROM; Bilateral   Hip Adduction Sitting;AROM;15 reps;Bilateral   Knee AROM Long Arc Quad Sitting;20 reps;AROM; Bilateral   Ankle Pumps Sitting;20 reps;AROM; Bilateral   Marching Sitting;20 reps;AROM; Bilateral   Assessment   Prognosis Good   Problem List Decreased strength;Decreased endurance; Impaired balance;Decreased mobility; Decreased safety awareness   Assessment pt progressed c ambulation endruance s/o device on levels no lob  noted some shortness of breath on 4liters o2  pulse ox after stair training 91 lowest otherwise 94-97%  breathing most challenged c ascending stairs,rests provided  performed le therapeutic exercise hep issued and reviweed  will d/c home c d/c  declined trial of cane   Goals   Patient Goals do therapy   STG Expiration Date 04/16/17   Treatment Day 1   Plan   Treatment/Interventions Functional transfer training;LE strengthening/ROM; Elevations; Therapeutic exercise; Endurance training;Patient/family training;Equipment eval/education; Bed mobility;Gait training;Spoke to nursing   Progress Progressing toward goals   PT Frequency 5x/wk   Recommendation   Recommendation Home independently;Home with family support  (unsure if will need home o2  now on 4liters vs 2)   PT - OK to Discharge Yes

## 2017-04-12 NOTE — OCCUPATIONAL THERAPY NOTE
Occupational Therapy Treatment Note:         04/12/17 1515   Restrictions/Precautions   Weight Bearing Precautions No   Other Precautions O2;Fall Risk   Pain Assessment   Pain Assessment No/denies pain   Pain Score No Pain   ADL   Where Assessed Edge of bed   LB Dressing Assistance 5  Supervision/Setup   LB Dressing Deficit Setup   LB Dressing Comments Pulse O2 dropped to 88% on 4 liters with functional reach activity  Toileting Assistance  5  Supervision/Setup   Toileting Deficit Setup;Supervison/safety; Increased time to complete   Toileting Comments Pt was educated on need to slow pace with activities  Functional Standing Tolerance   Time 4 mins  Activity functional mobility  Comments Pt required cues to take seated rest breaks through out activities instance  Bed Mobility   Supine to Sit 5  Supervision   Additional items Assist x 1   Sit to Supine 5  Supervision   Additional items Assist x 1   Transfers   Sit to Stand 5  Supervision   Stand to Sit 5  Supervision   Stand pivot 5  Supervision   Additional items Assist x 1;Verbal cues   Toilet transfer 5  Supervision   Additional Comments Cues for safe O2 line management required  Functional Mobility   Functional Mobility 5  Supervision   Additional Comments To for safety and to take seated rest breaks provided  Toilet Transfers   Toilet Transfer From [employer] Transfer Type To and from   Toilet Transfer to Standard bedside commode   Toilet Transfer Technique Stand pivot; Ambulating   Toilet Transfers Supervision   Toilet Transfers Comments Pt may benefit from continued use of bedside commode to ease with rise and descent      Cognition   Overall Cognitive Status WFL   Arousal/Participation Alert   Attention Within functional limits   Orientation Level Oriented X4   Memory Within functional limits   Following Commands Follows multistep commands with increased time or repetition   Comments Pt was able to return demonstration of reviewed information with good quality  Cognition Assessment Tools Other (Comment)  (functional observation  )   Activity Tolerance   Activity Tolerance Patient limited by fatigue   Medical Staff Made Aware Reported noted drop in pulse O2 with activity to RN  Pt dropped from 96% on 4 liters to 88% with functional reach activity  Assessment   Assessment Pt was seen for skilled OT with focus on completion of self care tasks, review of compensatory breathing and energy conservation techs  Pt with limited incite to respiratory deficits  Pt indicated she has a pulse oximeter at home and thats why she came in because she was in the 70's  After completion of functional reach activity, Pt dropped to 88% with in 1 min of sustained useage of BUE  After review of possibly requiring AE for LE dressing and seated positioning with rest breaks for all self care, Pt stated, "I wont use equipment, I'll just be SOB'  Pt may benefit from further pulmonary rehab and a home safety evaluation due to possible need for home O2  Plan   Treatment Interventions ADL retraining;Functional transfer training; Endurance training   Goal Expiration Date 04/21/17   Treatment Day 1   OT Frequency 3-5x/wk   Recommendation   Discharge Recommendation (Home safety eval/pulmonary rehab  )   FREDDIE Morrison

## 2017-04-12 NOTE — PLAN OF CARE
Problem: PHYSICAL THERAPY ADULT  Goal: Performs mobility at highest level of function for planned discharge setting  See evaluation for individualized goals  Treatment/Interventions: Functional transfer training, LE strengthening/ROM, Elevations, Therapeutic exercise, Endurance training, Patient/family training, Equipment eval/education, Bed mobility, Gait training, Spoke to nursing, OT  Equipment Recommended: (TBD)      See flowsheet documentation for full assessment, interventions and recommendations  Outcome: Progressing  Prognosis: Good  Problem List: Decreased strength, Decreased endurance, Impaired balance, Decreased mobility, Decreased safety awareness  Assessment: pt progressed c ambulation endruance s/o device on levels no lob  noted some shortness of breath on 4liters o2  pulse ox after stair training 91 lowest otherwise 94-97%  breathing most challenged c ascending stairs,rests provided  performed le therapeutic exercise hep issued and reviweed  will d/c home c d/c  declined trial of cane        Recommendation: Home independently, Home with family support (unsure if will need home o2  now on 4liters vs 2)     PT - OK to Discharge: Yes     See flowsheet documentation for full assessment

## 2017-04-12 NOTE — PROGRESS NOTES
[facility] Progress Note  Patient: Marco Peralta 61 y o  female   MRN: [id number]  PCP: Cony Hartley MD  Unit/Bed#: E4 -01 Encounter: [id number]  Date Of Visit: 17    Assessment:    Principal Problem:    Acute hypoxemic respiratory failure  Active Problems:    Pneumonia    Tobacco abuse    COPD (chronic obstructive pulmonary disease)    Abdominal aortic aneurysm (AAA) greater than 39 mm in diameter    Squamous cell lung cancer    Renal artery stenosis      Plan:    · Acute hypoxemic respiratory failure, multifactorial from combination of possible radiation pneumonitis and her significant anemia also continued tobacco abuse in setting of lung cancer  She'll probably need supplemental oxygen at discharge with home O2 eval prior  · Radiation pneumonitis will D/C empiric dosage of cefepime as see no signs of infection or purulence continue steroid management as per pulmonary  · Anemia in relation to her underlying chronic disease shortness of lung cancer along with chemotherapy probably contributing to her present hypoxemic respiratory failure received 1 unit of packed red cells overnight improved symptomatically although remains on 3 L of O2 will perform home O2 evaluation  · Abdominal aortic aneurysm asymptomatic  · COPD continue nebulizers and oxygen, severe emphysema continue Symbicort follow with Dr Nile Trejo  · Hypokalemia replaced and resolved      VTE Pharmacologic Prophylaxis:   Pharmacologic: Heparin  Mechanical VTE Prophylaxis in Place: Yes    Discussions with Specialists or Other Care Team Provider: Yes Dr Baldemar Mondragon for Care: 30 minutes  More than 50% of total time spent on counseling and coordination of care as described above  Subjective: Feels somewhat better since transfusion overnight denying any chest pain denies cough or purulent production    Objective: Looks underweight    Vitals:   Temp (24hrs), Av 6 °F (36 4 °C), Min:96 1 °F (35 6 °C), Max:98 6 °F (37 °C)    HR:  [85-98] 93  Resp:  [18] 18  BP: ()/(55-73) 100/73  SpO2:  [92 %-98 %] 95 %  Body mass index is 20 82 kg/(m^2)  Input and Output Summary (last 24 hours): Intake/Output Summary (Last 24 hours) at 04/12/17 1106  Last data filed at 04/12/17 0501   Gross per 24 hour   Intake              820 ml   Output              600 ml   Net              220 ml       Physical Exam:     Physical Exam:   General appearance: alert, appears stated age and cooperative  Head: Normocephalic, without obvious abnormality, atraumatic  Lungs: clear to auscultation bilaterally  Heart: regular rate and rhythm  Abdomen: soft, non-tender; bowel sounds normal; no masses,  no organomegaly  Back: negative  Extremities: extremities normal, atraumatic, no cyanosis or edema  Neurologic: Grossly normal      Additional Data:     Labs:      Results from last 7 days  Lab Units 04/11/17  0502 04/10/17  1441   WBC Thousand/uL 3 89* 4 05*   HEMOGLOBIN g/dL 7 6* 8 8*   HEMATOCRIT % 24 6* 28 6*   PLATELETS Thousands/uL 129* 164   NEUTROS PCT %  --  79*   LYMPHS PCT %  --  15   MONOS PCT %  --  4   EOS PCT %  --  2       Results from last 7 days  Lab Units 04/12/17  0458  04/10/17  1441   SODIUM mmol/L 140  < > 138   POTASSIUM mmol/L 4 2  < > 3 0*   CHLORIDE mmol/L 105  < > 99*   CO2 mmol/L 29  < > 30   BUN mg/dL 17  < > 20   CREATININE mg/dL 0 99  < > 1 32*   CALCIUM mg/dL 8 7  < > 9 0   TOTAL PROTEIN g/dL  --   --  8 3*   BILIRUBIN TOTAL mg/dL  --   --  0 46   ALK PHOS U/L  --   --  77   ALT U/L  --   --  32   AST U/L  --   --  38   GLUCOSE RANDOM mg/dL 146*  < > 110   < > = values in this interval not displayed  * I Have Reviewed All Lab Data Listed Above  * Additional Pertinent Lab Tests Reviewed: All Labs For Current Hospital Admission Reviewed    Imaging:  Xr Chest 2 Views    Result Date: 4/10/2017  Narrative: CHEST - DUAL ENERGY INDICATION:  Shortness of breath   COMPARISON:  April 5, 2017 VIEWS:  PA (including soft tissue/bone algorithms) and lateral projections IMAGES:  4 FINDINGS:     Cardiomediastinal silhouette appears unremarkable  There is extensive consolidative opacity in the right lung with some areas of scarring  Cavitary lesion right lung base again noted  Visualized osseous structures appear within normal limits for the patient's age  Impression: Similar extensive interstitial and alveolar densities in the right lung particularly in the mid lung  There is a cavitary lesion at the right lung base again noted as well  Workstation performed: [id number]     Xr Chest Pa & Lateral    Result Date: 4/5/2017  Narrative: CHEST INDICATION: Shortness of breath  History of lung carcinoma  COMPARISON: 3/21/2017  VIEWS:  Frontal and lateral projections; 2 images FINDINGS:    The cardiomediastinal silhouette is unremarkable  Extensive right-sided pleural-parenchymal scarring is identified  Cavitary right lung base mass again present  Osseous structures are age appropriate  Impression: Extensive right-sided pleural-parenchymal scarring again seen  Cavitary right lung base mass  Workstation performed: [id number]     Xr Chest Pa & Lateral    Result Date: 3/21/2017  Narrative: CHEST - DUAL ENERGY INDICATION: Shortness of breath  Cough  Tachycardia  Right lung cancer  COMPARISON: 12/13/2016 and prior VIEWS:  PA (including soft tissue/bone algorithms) and lateral projections; 4 images FINDINGS: The cardiomediastinal silhouette is stable  There is marked reduction in size of the right lower lobe mass/cavitary lesion  There is residual adjacent patchy opacity and there is a new small right effusion  May be partially loculated  Stable plaque-like opacity right apex  Bones are stable  Impression: Marked reduction in size of the right lower lobe mass/cavitary lesion  Residual adjacent patchy tumor and/or inflammatory disease  Cannot exclude superimposed pneumonia  New small right effusion   ##imslh##imslh Workstation performed: [id number]     Xr Ribs 2 Vw Right    Result Date: 4/6/2017  Narrative: RIGHT RIBS INDICATION:  Right mid and axillary rib pain with cough  No necrotic right basilar lung mass  COMPARISON:  None VIEWS:  3 views right hemithorax IMAGES:  4 FINDINGS: There is no fracture or pathologic bone lesion  Soft tissues are unremarkable  The visualized right hemithorax demonstrates a cystic mass of the right lung base similar to prior study with surrounding opacity for which infectious or inflammatory changes not excluded  Bronchoalveolar infiltration of tumor is also a possibility  Increased opacity right midlung zone laterally could reflect worsening metastatic disease versus underlying infectious/inflammatory process  Similar increased opacity noted at the right apex as well  Prominent interstitial markings left lung base as before  Small right basilar loculated pleural effusion  Aortic stent graft noted  There is no pneumothorax  Impression: 1  No fracture  2   Persistent cystic right basilar lung mass consistent with known necrotic tumor  Worsening right hemithorax opacification could reflect worsening metastases with underlying infectious or inflammatory process also a possibility  3   Stable right basilar pleural effusion  Workstation performed: [id number]     Nm Pet Ct Skull Base To Mid Thigh    Result Date: 3/28/2017  Narrative: PET/CT SCAN INDICATION: Lung cancer, restaging post chemoradiation, prior aortic aneurysm repair 10/2016 MODIFIER:     PS COMPARISON: 12/9/2016 CELL TYPE:  Squamous cell carcinoma, right lower lung biopsy 12/13/2016 TECHNIQUE:   8 5 mCi F-18-FDG administered IV  Multiplanar attenuation corrected and non attenuation corrected PET images are available for interpretation, and contiguous, low dose, axial CT sections were obtained from the skull base through the femurs following the administration of oral contrast material (7 5 cc Omnipaque-240 in 300 cc water)  Intravenous contrast material was not utilized  Fasting serum glucose: 107 mg/dl FINDINGS: VISUALIZED BRAIN:   No acute abnormalities are seen  HEAD/NECK:   Stable hypermetabolic left parotid gland nodule, measuring 11 x 6 mm, SUV 16 7, prior measurement 11 x 7 mm, SUV 19 8  This likely represents a primary parotid lesion such as a Warthin's tumor or pleomorphic adenoma  A metastasis is felt to be less likely  No new FDG avid cervical adenopathy is seen  CT images: Asymmetric right thyroid appears similar  CHEST:   Necrotic right lower lung mass measures approximately 4 8 x 3 7 cm in axial dimensions, with decreased FDG activity, SUV 7 4, prior SUV 19 2  This likely represents some response to therapy, however viable tumor may still remain  There are increased surrounding pleural parenchymal densities with hypermetabolism, SUV 6  This may be due to posttreatment inflammatory changes  Coexistent tumor is also not excluded  Smaller densities are noted in the right mid upper lung field  Right apical pleural-parenchymal densities demonstrate interval increased hypermetabolism, SUV 5, prior SUV 3 3  This may also be inflammatory, but may be reassessed on follow-up exam to exclude tumor involvement  Stable left apical pleural parenchymal scarring with only minimal FDG activity, SUV 1 9, prior SUV 2  Stable right hilar activity, SUV 3 1, prior study 3 3  Subcarinal activity also appears similar, SUV 3 3, prior SUV 3 3  This measures 9 mm short axis diameter, prior measurement 9 mm  No new hypermetabolic adenopathy  CT images: Severe emphysema  Coronary artery calcifications  ABDOMEN:   No FDG avid soft tissue lesions are seen  CT images: Aortic aneurysm repair with stent graft  Native aorta measures 6 5 cm, prior measurement 6 5 cm  Stable borderline splenomegaly measuring 15 cm craniocaudad dimension  PELVIS: No FDG avid soft tissue lesions are seen  CT images: Stable   OSSEOUS STRUCTURES: No FDG avid lesions are seen  CT images: Stable  Impression: 1  Decreased FDG activity of right lower lung mass, suggesting some response to therapy  However viable tumor may still remain  Increased hypermetabolic adjacent pleural parenchymal densities, likely from posttreatment changes  Tumor involvement not excluded  2   Increased hypermetabolism of right apical pleural-parenchymal densities, possibly just inflammatory, however tumor involvement is not excluded  This may be reassessed on follow-up exam  3   Right hilar and mediastinal roya activity without significant change  No new hypermetabolic adenopathy  4   No new hypermetabolic metastases in the abdomen and pelvis or neck  5   Stable hypermetabolic left parotid gland nodule  Workstation performed: [id number]     Radiology Results    Result Date: 4/11/2017  Narrative: Ordered by an unspecified provider  Cta Ed Chest Pe Study    Result Date: 4/10/2017  Narrative: CTA - CHEST WITH IV CONTRAST - PULMONARY ANGIOGRAM INDICATION: Chest pain  Shortness of breath  Lung cancer patient on chemotherapy COMPARISON: PET CT scan from 3/28/2017 TECHNIQUE: CTA examination of the chest was performed using angiographic technique according to a protocol specifically tailored to evaluate for pulmonary embolism  Reformatted images were created in axial, sagittal, and coronal planes  In addition, coronal 3D MIP postprocessing was performed on the acquisition scanner  Radiation dose length product (DLP) for this visit:  185 mGy-cm   This examination, like all CT scans performed in the [place], was performed utilizing techniques to minimize radiation dose exposure, including the use of iterative reconstruction and automated exposure control  IV Contrast:  85 mL of iodixanol (VISIPAQUE)           FINDINGS: PULMONARY ARTERIAL TREE:  No pulmonary embolus is seen  LUNGS:  There is severe bullous centrilobular emphysema bilaterally right greater than left  There is also significant finding of cavitated lesion in the right lung base which is similar in appearance to prior studies  Diameter of the cavity is 3 6 cm on today's study, previously up to 4 8 cm  There is an air-fluid level within the cavity  This could be necrotic tumor or an infected bulla  There is surrounding parenchymal consolidation  There is also peripheral parenchymal consolidation in the lateral aspect of the right upper lobe which is moderately significant and is new since the previous exam   There are areas of right lower lobe bronchial obstruction which might be mucous plugging  The left-sided airways appear clear  No acute findings  in the left lung  There is scarring at the apex  PLEURA:  There is a small right pleural effusion which is larger than on the prior PET scan  No left-sided effusion  HEART/AORTA:  Coronary calcification noted  Heart size normal   Aorta intact  MEDIASTINUM AND TRIPP:  There is some right hilar adenopathy  There is also borderline prominent AP window lymph node  AP window lymph node appears slightly more prominent than on the previous PET scan  Current estimated size 10 x 15 mm  CHEST WALL AND LOWER NECK:  Unremarkable  VISUALIZED STRUCTURES IN THE UPPER ABDOMEN:  The included portion of the spleen seems large  No acute upper abdominal pathology visible  A portion of an aortic stent is seen  OSSEOUS STRUCTURES:  There is degenerative arthritis of the right shoulder joint  Impression: No evidence of pulmonary emboli  Slightly increased size of right pleural effusion compared with prior PET scan and significant new areas of right lung consolidation presumably representing pneumonia in the upper lobe  Slight interval decrease in the size of the cavitary lesion at the right base with an air-fluid level noted   Severe centrilobular bullous emphysema bilaterally Stable to perhaps slightly increased size of an AP window lymph node and mild stable right hilar adenopathy  The spleen appears large ##imslh##imslh    Workstation performed: [id number]     Imaging Reports Reviewed Today Include: Reviewed chest x-ray and CTA of chest  Imaging Personally Reviewed by Myself Includes:  *no  Procedure: Xr Chest 2 Views    Result Date: 4/10/2017  Narrative: CHEST - DUAL ENERGY INDICATION:  Shortness of breath  COMPARISON:  April 5, 2017 VIEWS:  PA (including soft tissue/bone algorithms) and lateral projections IMAGES:  4 FINDINGS:     Cardiomediastinal silhouette appears unremarkable  There is extensive consolidative opacity in the right lung with some areas of scarring  Cavitary lesion right lung base again noted  Visualized osseous structures appear within normal limits for the patient's age  Impression: Similar extensive interstitial and alveolar densities in the right lung particularly in the mid lung  There is a cavitary lesion at the right lung base again noted as well  Workstation performed: [id number]     Procedure: Radiology Results    Result Date: 4/11/2017  Narrative: Ordered by an unspecified provider  Procedure: Cta Ed Chest Pe Study    Result Date: 4/10/2017  Narrative: CTA - CHEST WITH IV CONTRAST - PULMONARY ANGIOGRAM INDICATION: Chest pain  Shortness of breath  Lung cancer patient on chemotherapy COMPARISON: PET CT scan from 3/28/2017 TECHNIQUE: CTA examination of the chest was performed using angiographic technique according to a protocol specifically tailored to evaluate for pulmonary embolism  Reformatted images were created in axial, sagittal, and coronal planes  In addition, coronal 3D MIP postprocessing was performed on the acquisition scanner  Radiation dose length product (DLP) for this visit:  185 mGy-cm   This examination, like all CT scans performed in the [facility], was performed utilizing techniques to minimize radiation dose exposure, including the use of iterative reconstruction and automated exposure control   IV Contrast:  85 mL of iodixanol (VISIPAQUE)           FINDINGS: PULMONARY ARTERIAL TREE:  No pulmonary embolus is seen  LUNGS:  There is severe bullous centrilobular emphysema bilaterally right greater than left  There is also significant finding of cavitated lesion in the right lung base which is similar in appearance to prior studies  Diameter of the cavity is 3 6 cm on today's study, previously up to 4 8 cm  There is an air-fluid level within the cavity  This could be necrotic tumor or an infected bulla  There is surrounding parenchymal consolidation  There is also peripheral parenchymal consolidation in the lateral aspect of the right upper lobe which is moderately significant and is new since the previous exam   There are areas of right lower lobe bronchial obstruction which might be mucous plugging  The left-sided airways appear clear  No acute findings  in the left lung  There is scarring at the apex  PLEURA:  There is a small right pleural effusion which is larger than on the prior PET scan  No left-sided effusion  HEART/AORTA:  Coronary calcification noted  Heart size normal   Aorta intact  MEDIASTINUM AND TRIPP:  There is some right hilar adenopathy  There is also borderline prominent AP window lymph node  AP window lymph node appears slightly more prominent than on the previous PET scan  Current estimated size 10 x 15 mm  CHEST WALL AND LOWER NECK:  Unremarkable  VISUALIZED STRUCTURES IN THE UPPER ABDOMEN:  The included portion of the spleen seems large  No acute upper abdominal pathology visible  A portion of an aortic stent is seen  OSSEOUS STRUCTURES:  There is degenerative arthritis of the right shoulder joint  Impression: No evidence of pulmonary emboli  Slightly increased size of right pleural effusion compared with prior PET scan and significant new areas of right lung consolidation presumably representing pneumonia in the upper lobe   Slight interval decrease in the size of the cavitary lesion at the right base with an air-fluid level noted  Severe centrilobular bullous emphysema bilaterally Stable to perhaps slightly increased size of an AP window lymph node and mild stable right hilar adenopathy  The spleen appears large ##imslh##imslh    Workstation performed: [id number]        Recent Cultures (last 7 days):       Results from last 7 days  Lab Units 04/11/17  0030 04/10/17  1837 04/10/17  1836   BLOOD CULTURE   --  No Growth at 24 hrs  No Growth at 24 hrs  LEGIONELLA URINARY ANTIGEN  Negative  --   --        Last 24 Hours Medication List:     budesonide-formoterol 2 puff Inhalation BID   calcium carbonate 1 tablet Oral Daily With Breakfast   ferrous sulfate 325 mg Oral Daily With Breakfast   furosemide 40 mg Oral Daily   guaiFENesin 600 mg Oral BID   heparin (porcine) 5,000 Units Subcutaneous Q8H [address]   multivitamin-minerals 1 tablet Oral Daily   predniSONE 60 mg Oral Daily        Today, Patient Was Seen By: Kendra Kilpatrick MD    ** Please Note: Dragon 360 Dictation voice to text software may have been used in the creation of this document   **

## 2017-04-12 NOTE — PROGRESS NOTES
Progress Note - Pulmonary   Troy Bird 61 y o  female MRN: [id number]  Unit/Bed#: E4 -01 Encounter: [id number]    Assessment/Plan:  1  Acute hypoxic respiratory failure  · Continue supplemental oxygen  · Suspect that she will need oxygen at discharge and would perform home oxygen evaluation prior to any planned for discharge  2  Lung cancer with radiation pneumonitis  · Continue prednisone at 60 mg until discharge then educe to 40 mg at discharge and taper by 10 mg weekly  · Low suspicion clinically for pneumonia  I discussed with Dr Suszanne Bamberger and it will be reasonable to discontinue antibiotics and continue observation  She recently completed a course of antibiotics and does not report significant infection symptoms  · We will need follow-up CAT scan in 2 months  3  Severe emphysema  · She is followed by Dr Sapp Person  Would arrange outpatient follow-up with him after discharge for continuation of therapy and repeat CAT scan  · Continue outpatient Symbicort      ---------------------------------------------------------------------------------------------------------------------  HPI/Interval History:   Transfused yesterday  Feels that her breathing is better  She seems resistant to the idea of having oxygen at home    Vitals:   Blood pressure 100/73, pulse 93, temperature (!) 97 2 °F (36 2 °C), temperature source Tympanic, resp  rate 18, height 5' 6" (1 676 m), weight 58 5 kg (128 lb 15 5 oz), SpO2 95 %  ,Body mass index is 20 82 kg/(m^2)  SpO2: 95 %  SpO2 Activity: At Rest  O2 Device: Nasal cannula    Physical Exam:   Gen: Alert and without respiratory distress  HEENT: PERRL  O/P: clear  no erythema or exudate  Moderate pallor  Neck: Supple  There is no JVD,Trachea is midline  Chest: Distant breath sounds, decreased at the right base  No wheeze few coarse rales  Cardiac: Regular, no murmur  Abdomen: Soft and nontender  Bowel sounds are present    Extremities: No edema      Labs:    BMP:     Results from last 7 days  Lab Units 04/12/17  0458   SODIUM mmol/L 140   POTASSIUM mmol/L 4 2   CHLORIDE mmol/L 105   CO2 mmol/L 29   BUN mg/dL 17   CREATININE mg/dL 0 99   GLUCOSE RANDOM mg/dL 146*   CALCIUM mg/dL 8 7       Imaging studies:  No new studies    Tiffanie Orellana MD    Portions of the record may have been created with voice recognition software  Occasional wrong word or "sound a like" substitutions may have occurred due to the inherent limitations of voice recognition software  Read the chart carefully and recognize, using context, where substitutions have occurred

## 2017-04-13 ENCOUNTER — APPOINTMENT (INPATIENT)
Dept: PHYSICAL THERAPY | Facility: HOSPITAL | Age: 67
DRG: 206 | End: 2017-04-13
Payer: COMMERCIAL

## 2017-04-13 VITALS
RESPIRATION RATE: 18 BRPM | SYSTOLIC BLOOD PRESSURE: 117 MMHG | DIASTOLIC BLOOD PRESSURE: 63 MMHG | BODY MASS INDEX: 20.73 KG/M2 | HEIGHT: 66 IN | TEMPERATURE: 96.9 F | HEART RATE: 71 BPM | WEIGHT: 128.97 LBS | OXYGEN SATURATION: 97 %

## 2017-04-13 LAB
BASOPHILS # BLD AUTO: 0 THOUSANDS/ΜL (ref 0–0.1)
BASOPHILS NFR BLD AUTO: 0 % (ref 0–1)
EOSINOPHIL # BLD AUTO: 0 THOUSAND/ΜL (ref 0–0.61)
EOSINOPHIL NFR BLD AUTO: 0 % (ref 0–6)
ERYTHROCYTE [DISTWIDTH] IN BLOOD BY AUTOMATED COUNT: 16.7 % (ref 11.6–15.1)
HCT VFR BLD AUTO: 26.9 % (ref 34.8–46.1)
HGB BLD-MCNC: 8.3 G/DL (ref 11.5–15.4)
LYMPHOCYTES # BLD AUTO: 0.53 THOUSANDS/ΜL (ref 0.6–4.47)
LYMPHOCYTES NFR BLD AUTO: 9 % (ref 14–44)
MCH RBC QN AUTO: 31.2 PG (ref 26.8–34.3)
MCHC RBC AUTO-ENTMCNC: 30.9 G/DL (ref 31.4–37.4)
MCV RBC AUTO: 101 FL (ref 82–98)
MONOCYTES # BLD AUTO: 0.16 THOUSAND/ΜL (ref 0.17–1.22)
MONOCYTES NFR BLD AUTO: 3 % (ref 4–12)
NEUTROPHILS # BLD AUTO: 5.5 THOUSANDS/ΜL (ref 1.85–7.62)
NEUTS SEG NFR BLD AUTO: 88 % (ref 43–75)
PLATELET # BLD AUTO: 142 THOUSANDS/UL (ref 149–390)
PMV BLD AUTO: 9.5 FL (ref 8.9–12.7)
RBC # BLD AUTO: 2.66 MILLION/UL (ref 3.81–5.12)
WBC # BLD AUTO: 6.19 THOUSAND/UL (ref 4.31–10.16)

## 2017-04-13 PROCEDURE — 94620 HB PULMONARY STRESS TEST/SIMPLE: CPT

## 2017-04-13 PROCEDURE — 85025 COMPLETE CBC W/AUTO DIFF WBC: CPT | Performed by: INTERNAL MEDICINE

## 2017-04-13 PROCEDURE — 97116 GAIT TRAINING THERAPY: CPT

## 2017-04-13 PROCEDURE — 97110 THERAPEUTIC EXERCISES: CPT

## 2017-04-13 RX ADMIN — GUAIFENESIN 600 MG: 600 TABLET, EXTENDED RELEASE ORAL at 18:01

## 2017-04-13 RX ADMIN — Medication 1 TABLET: at 08:34

## 2017-04-13 RX ADMIN — PREDNISONE 60 MG: 20 TABLET ORAL at 08:34

## 2017-04-13 RX ADMIN — BUDESONIDE AND FORMOTEROL FUMARATE DIHYDRATE 2 PUFF: 160; 4.5 AEROSOL RESPIRATORY (INHALATION) at 08:34

## 2017-04-13 RX ADMIN — FERROUS SULFATE TAB 325 MG (65 MG ELEMENTAL FE) 325 MG: 325 (65 FE) TAB at 08:34

## 2017-04-13 RX ADMIN — HEPARIN SODIUM 5000 UNITS: 5000 INJECTION, SOLUTION INTRAVENOUS; SUBCUTANEOUS at 05:25

## 2017-04-13 RX ADMIN — GUAIFENESIN 600 MG: 600 TABLET, EXTENDED RELEASE ORAL at 08:34

## 2017-04-13 RX ADMIN — FUROSEMIDE 40 MG: 40 TABLET ORAL at 08:34

## 2017-04-13 RX ADMIN — BUDESONIDE AND FORMOTEROL FUMARATE DIHYDRATE 2 PUFF: 160; 4.5 AEROSOL RESPIRATORY (INHALATION) at 18:01

## 2017-04-13 NOTE — PLAN OF CARE
DISCHARGE PLANNING     Discharge to home or other facility with appropriate resources Adequate for Discharge        DISCHARGE PLANNING - CARE MANAGEMENT     Discharge to post-acute care or home with appropriate resources Adequate for Discharge        INFECTION - ADULT     Absence or prevention of progression during hospitalization Adequate for Discharge     Absence of fever/infection during neutropenic period Adequate for Discharge        Knowledge Deficit     Patient/family/caregiver demonstrates understanding of disease process, treatment plan, medications, and discharge instructions Adequate for Discharge        Knowledge Deficit     Patient/family/caregiver demonstrates understanding of disease process, treatment plan, medications, and discharge instructions Adequate for Discharge        PAIN - ADULT     Verbalizes/displays adequate comfort level or baseline comfort level Adequate for Discharge        Prexisting or High Potential for Compromised Skin Integrity     Skin integrity is maintained or improved Adequate for Discharge        SAFETY ADULT     Patient will remain free of falls Adequate for Discharge     Maintain or return to baseline ADL function Adequate for Discharge     Maintain or return mobility status to optimal level Adequate for Discharge

## 2017-04-13 NOTE — DISCHARGE SUMMARY
Discharge Summary - [facility] Internal Medicine    Patient Information: Lora Khan 61 y o  female MRN: [id number]  Unit/Bed#: E4 -01 Encounter: [id number]    Discharging Physician / Practitioner: Peggyann Bosworth, MD  PCP: Elia Gilford, MD  Admission Date: 4/10/2017  Discharge Date: 04/13/17    Reason for Admission: Hypoxic respiratory failure    Discharge Diagnoses: Acute hypoxic respiratory failure, radiation pneumonitis    Principal Problem:    Acute hypoxemic respiratory failure  Active Problems:    Pneumonia    Tobacco abuse    COPD (chronic obstructive pulmonary disease)    Abdominal aortic aneurysm (AAA) greater than 39 mm in diameter    Squamous cell lung cancer    Renal artery stenosis  Resolved Problems:    * No resolved hospital problems  *      Consultations During Hospital Stay:  · Pulmonary    Procedures Performed:     Xr Chest 2 Views    Result Date: 4/10/2017  Narrative: CHEST - DUAL ENERGY INDICATION:  Shortness of breath  COMPARISON:  April 5, 2017 VIEWS:  PA (including soft tissue/bone algorithms) and lateral projections IMAGES:  4 FINDINGS:     Cardiomediastinal silhouette appears unremarkable  There is extensive consolidative opacity in the right lung with some areas of scarring  Cavitary lesion right lung base again noted  Visualized osseous structures appear within normal limits for the patient's age  Impression: Similar extensive interstitial and alveolar densities in the right lung particularly in the mid lung  There is a cavitary lesion at the right lung base again noted as well  Workstation performed: [id number]     Xr Chest Pa & Lateral    Result Date: 4/5/2017  Narrative: CHEST INDICATION: Shortness of breath  History of lung carcinoma  COMPARISON: 3/21/2017  VIEWS:  Frontal and lateral projections; 2 images FINDINGS:    The cardiomediastinal silhouette is unremarkable  Extensive right-sided pleural-parenchymal scarring is identified    Cavitary right lung base mass again present  Osseous structures are age appropriate  Impression: Extensive right-sided pleural-parenchymal scarring again seen  Cavitary right lung base mass  Workstation performed: [id number]     Xr Chest Pa & Lateral    Result Date: 3/21/2017  Narrative: CHEST - DUAL ENERGY INDICATION: Shortness of breath  Cough  Tachycardia  Right lung cancer  COMPARISON: 12/13/2016 and prior VIEWS:  PA (including soft tissue/bone algorithms) and lateral projections; 4 images FINDINGS: The cardiomediastinal silhouette is stable  There is marked reduction in size of the right lower lobe mass/cavitary lesion  There is residual adjacent patchy opacity and there is a new small right effusion  May be partially loculated  Stable plaque-like opacity right apex  Bones are stable  Impression: Marked reduction in size of the right lower lobe mass/cavitary lesion  Residual adjacent patchy tumor and/or inflammatory disease  Cannot exclude superimposed pneumonia  New small right effusion  ##imslh##imslh Workstation performed: [id number]     Xr Ribs 2 Vw Right    Result Date: 4/6/2017  Narrative: RIGHT RIBS INDICATION:  Right mid and axillary rib pain with cough  No necrotic right basilar lung mass  COMPARISON:  None VIEWS:  3 views right hemithorax IMAGES:  4 FINDINGS: There is no fracture or pathologic bone lesion  Soft tissues are unremarkable  The visualized right hemithorax demonstrates a cystic mass of the right lung base similar to prior study with surrounding opacity for which infectious or inflammatory changes not excluded  Bronchoalveolar infiltration of tumor is also a possibility  Increased opacity right midlung zone laterally could reflect worsening metastatic disease versus underlying infectious/inflammatory process  Similar increased opacity noted at the right apex as well  Prominent interstitial markings left lung base as before  Small right basilar loculated pleural effusion   Aortic stent graft noted  There is no pneumothorax  Impression: 1  No fracture  2   Persistent cystic right basilar lung mass consistent with known necrotic tumor  Worsening right hemithorax opacification could reflect worsening metastases with underlying infectious or inflammatory process also a possibility  3   Stable right basilar pleural effusion  Workstation performed: [id number]     Nm Pet Ct Skull Base To Mid Thigh    Result Date: 3/28/2017  Narrative: PET/CT SCAN INDICATION: Lung cancer, restaging post chemoradiation, prior aortic aneurysm repair 10/2016 MODIFIER:     PS COMPARISON: 12/9/2016 CELL TYPE:  Squamous cell carcinoma, right lower lung biopsy 12/13/2016 TECHNIQUE:   8 5 mCi F-18-FDG administered IV  Multiplanar attenuation corrected and non attenuation corrected PET images are available for interpretation, and contiguous, low dose, axial CT sections were obtained from the skull base through the femurs following the administration of oral contrast material (7 5 cc Omnipaque-240 in 300 cc water)  Intravenous contrast material was not utilized  Fasting serum glucose: 107 mg/dl FINDINGS: VISUALIZED BRAIN:   No acute abnormalities are seen  HEAD/NECK:   Stable hypermetabolic left parotid gland nodule, measuring 11 x 6 mm, SUV 16 7, prior measurement 11 x 7 mm, SUV 19 8  This likely represents a primary parotid lesion such as a Warthin's tumor or pleomorphic adenoma  A metastasis is felt to be less likely  No new FDG avid cervical adenopathy is seen  CT images: Asymmetric right thyroid appears similar  CHEST:   Necrotic right lower lung mass measures approximately 4 8 x 3 7 cm in axial dimensions, with decreased FDG activity, SUV 7 4, prior SUV 19 2  This likely represents some response to therapy, however viable tumor may still remain  There are increased surrounding pleural parenchymal densities with hypermetabolism, SUV 6  This may be due to posttreatment inflammatory changes    Coexistent tumor is also not excluded  Smaller densities are noted in the right mid upper lung field  Right apical pleural-parenchymal densities demonstrate interval increased hypermetabolism, SUV 5, prior SUV 3 3  This may also be inflammatory, but may be reassessed on follow-up exam to exclude tumor involvement  Stable left apical pleural parenchymal scarring with only minimal FDG activity, SUV 1 9, prior SUV 2  Stable right hilar activity, SUV 3 1, prior study 3 3  Subcarinal activity also appears similar, SUV 3 3, prior SUV 3 3  This measures 9 mm short axis diameter, prior measurement 9 mm  No new hypermetabolic adenopathy  CT images: Severe emphysema  Coronary artery calcifications  ABDOMEN:   No FDG avid soft tissue lesions are seen  CT images: Aortic aneurysm repair with stent graft  Native aorta measures 6 5 cm, prior measurement 6 5 cm  Stable borderline splenomegaly measuring 15 cm craniocaudad dimension  PELVIS: No FDG avid soft tissue lesions are seen  CT images: Stable  OSSEOUS STRUCTURES: No FDG avid lesions are seen  CT images: Stable  Impression: 1  Decreased FDG activity of right lower lung mass, suggesting some response to therapy  However viable tumor may still remain  Increased hypermetabolic adjacent pleural parenchymal densities, likely from posttreatment changes  Tumor involvement not excluded  2   Increased hypermetabolism of right apical pleural-parenchymal densities, possibly just inflammatory, however tumor involvement is not excluded  This may be reassessed on follow-up exam  3   Right hilar and mediastinal roya activity without significant change  No new hypermetabolic adenopathy  4   No new hypermetabolic metastases in the abdomen and pelvis or neck  5   Stable hypermetabolic left parotid gland nodule  Workstation performed: [id number]     Radiology Results    Result Date: 4/11/2017  Narrative: Ordered by an unspecified provider      Cta Ed Chest Pe Study    Result Date: 4/10/2017  Narrative: CTA - CHEST WITH IV CONTRAST - PULMONARY ANGIOGRAM INDICATION: Chest pain  Shortness of breath  Lung cancer patient on chemotherapy COMPARISON: PET CT scan from 3/28/2017 TECHNIQUE: CTA examination of the chest was performed using angiographic technique according to a protocol specifically tailored to evaluate for pulmonary embolism  Reformatted images were created in axial, sagittal, and coronal planes  In addition, coronal 3D MIP postprocessing was performed on the acquisition scanner  Radiation dose length product (DLP) for this visit:  185 mGy-cm   This examination, like all CT scans performed in the [facility], was performed utilizing techniques to minimize radiation dose exposure, including the use of iterative reconstruction and automated exposure control  IV Contrast:  85 mL of iodixanol (VISIPAQUE)           FINDINGS: PULMONARY ARTERIAL TREE:  No pulmonary embolus is seen  LUNGS:  There is severe bullous centrilobular emphysema bilaterally right greater than left  There is also significant finding of cavitated lesion in the right lung base which is similar in appearance to prior studies  Diameter of the cavity is 3 6 cm on today's study, previously up to 4 8 cm  There is an air-fluid level within the cavity  This could be necrotic tumor or an infected bulla  There is surrounding parenchymal consolidation  There is also peripheral parenchymal consolidation in the lateral aspect of the right upper lobe which is moderately significant and is new since the previous exam   There are areas of right lower lobe bronchial obstruction which might be mucous plugging  The left-sided airways appear clear  No acute findings  in the left lung  There is scarring at the apex  PLEURA:  There is a small right pleural effusion which is larger than on the prior PET scan  No left-sided effusion  HEART/AORTA:  Coronary calcification noted  Heart size normal   Aorta intact  MEDIASTINUM AND TRIPP:  There is some right hilar adenopathy  There is also borderline prominent AP window lymph node  AP window lymph node appears slightly more prominent than on the previous PET scan  Current estimated size 10 x 15 mm  CHEST WALL AND LOWER NECK:  Unremarkable  VISUALIZED STRUCTURES IN THE UPPER ABDOMEN:  The included portion of the spleen seems large  No acute upper abdominal pathology visible  A portion of an aortic stent is seen  OSSEOUS STRUCTURES:  There is degenerative arthritis of the right shoulder joint  Impression: No evidence of pulmonary emboli  Slightly increased size of right pleural effusion compared with prior PET scan and significant new areas of right lung consolidation presumably representing pneumonia in the upper lobe  Slight interval decrease in the size of the cavitary lesion at the right base with an air-fluid level noted  Severe centrilobular bullous emphysema bilaterally Stable to perhaps slightly increased size of an AP window lymph node and mild stable right hilar adenopathy  The spleen appears large ##imslh##imslh    Workstation performed: [id number]         Significant Findings:      Marked reduction in size of the right lower lobe mass/cavitary lesion  Residual adjacent patchy tumor and/or inflammatory disease  Cannot exclude superimposed pneumonia  New small right effusion  Changes suspicious for underlying radiation pneumonitis as patient did not manifest febrile course or leukocytosis with new presentation of hypoxic respiratory failure she had a response to high-dose steroids 60 mg prednisone throughout her stay and will be discharged on 40 mg course of prednisone to be a treated weekly down by 10 mg and follow up with Dr John Cortes in approximately 2-3 weeks will need follow-up CT imaging in 2 months area most recent PET scan as noted above  She will be continued on a course of Symbicort inhaler   She underwent a home O2 evaluation that didn't note desaturation with exertion 8 for oxygen therapy for 6 L and 2 L at rest    Incidental Findings:   ·   Oxygen desaturation with exertion need for home O2  Test Results Pending at Discharge (will require follow up): · None     Outpatient Tests Requested:  · None    Complications:  None    Hospital Course:     Chastity Peters is a 61 y o  female patient who originally presented to the hospital on 4/10/2017 due to hypoxic respiratory failure after undergoing a course of radiation therapy  known squamous cell carcinoma and lung mass   Which recent PET scan imaging notes diminishing size  Marked reduction in size of the right lower lobe mass/cavitary lesion  Residual adjacent patchy tumor and/or inflammatory disease  Cannot exclude superimposed pneumonia  New small right effusion  Changes suspicious for underlying radiation pneumonitis as patient did not manifest febrile course or leukocytosis with new presentation of hypoxic respiratory failure she had a response to high-dose steroids 60 mg prednisone throughout her stay and will be discharged on 40 mg course of prednisone to be a treated weekly down by 10 mg and follow up with Dr Angeles Krishna in approximately 2-3 weeks will need follow-up CT imaging in 2 months area most recent PET scan as noted above  She will be continued on a course of Symbicort inhaler  She underwent a home O2 evaluation that didn't note desaturation with exertion 8 for oxygen therapy for 6 L and 2 L at rest        Condition at Discharge: good     Discharge Day Visit / Exam:     * Please refer to separate progress for these details *    Discharge instructions/Information to patient and family: She will receive a prescription for prednisone 40 mg initially for week 1 and then thereafter 10 mg less subsequent weeks follow-up with Dr Angeles Krishna and will need follow-up CT imaging in to 3 months  See after visit summary for information provided to patient and family        Provisions for Follow-Up Care:  See after visit summary for information related to follow-up care and any pertinent home health orders  Disposition:     Home    For Discharges to [facility] SNF:   · Not Applicable to this Patient - Not Applicable to this Patient      Discharge Statement:  I spent 40 minutes discharging the patient  This time was spent on the day of discharge  I had direct contact with the patient on the day of discharge  Greater than 50% of the total time was spent examining patient, answering all patient questions, arranging and discussing plan of care with patient as well as directly providing post-discharge instructions  Additional time then spent on discharge activities  Discharge Medications:  See after visit summary for reconciled discharge medications provided to patient and family  ** Please Note: Dragon 360 Dictation voice to text software may have been used in the creation of this document   **

## 2017-04-13 NOTE — SOCIAL WORK
CM informed patient needs Home O2; Home O2 script sent to [facility]  Cm following as needed

## 2017-04-13 NOTE — PLAN OF CARE
Problem: PHYSICAL THERAPY ADULT  Goal: Performs mobility at highest level of function for planned discharge setting  See evaluation for individualized goals  Treatment/Interventions: Functional transfer training, LE strengthening/ROM, Elevations, Therapeutic exercise, Endurance training, Patient/family training, Equipment eval/education, Bed mobility, Gait training, Spoke to nursing, OT  Equipment Recommended: (TBD)      See flowsheet documentation for full assessment, interventions and recommendations  Outcome: Progressing  Prognosis: Good  Problem List: Decreased strength, Decreased endurance, Impaired balance, Decreased mobility, Decreased safety awareness  Assessment: pt progressed c ambulation endurance +foot clearance c trial of her sneakers on, c stand rests as destats on trial of 3 liters increased back to 4 liters destated 88% recovered to 90- 91% c time each stand rest ending 88% recovered seated to 93% ending c time  did le therapeutic exercise c added standing heel raises  hs curls,small squats c ue support c counter and sba  no pain or dizziness  she returned to bed but encouraged sit t/o the day to A lung recovery  noted she does  will d/c home         Recommendation: Home independently (may need home o2)     PT - OK to Discharge: Yes     See flowsheet documentation for full assessment

## 2017-04-13 NOTE — PROGRESS NOTES
Progress Note - Pulmonary   Nicolas Arreola 61 y o  female MRN: [id number]  Unit/Bed#: E4 -01 Encounter: [id number]    Assessment/Plan:  1  Acute hypoxic respiratory failure  · Oxygen and ordered for 2 L at rest and 4 L with exertion  Conserving device evaluation was also ordered  Prescription given to case management  2  Lung cancer with radiation pneumonitis  · Please provide the patient with prednisone 40 mg at discharge and taper by 10 mg weekly  Follow-up with Dr Melanie Pinedo, prior to discontinuing prednisone  This was discussed with Dr Collins Almendarez  · She will need follow-up CAT scan in 2 months  3  Severe emphysema  · She is followed by Dr Melanie Pinedo  Would arrange outpatient follow-up with him in 2-3 weeks after discharge for continuation of therapy, reevaluation of oxygen requirement and repeat CAT scan  · Continue outpatient Symbicort      ---------------------------------------------------------------------------------------------------------------------  HPI/Interval History:   Feels well  Significant only better than at admission  Oxygen qualifying tests suggest that she does need home oxygen however  Vitals:   Blood pressure 107/63, pulse 93, temperature (!) 96 3 °F (35 7 °C), temperature source Tympanic, resp  rate 18, height 5' 6" (1 676 m), weight 58 5 kg (128 lb 15 5 oz), SpO2 95 %  ,Body mass index is 20 82 kg/(m^2)  SpO2: 95 %  SpO2 Activity: At Rest  O2 Device: Nasal cannula     Oxygen qualifying assessment was reviewed with the respiratory therapist  Patient had saturations at the 89% on room air at rest  With ambulation she did not desaturate and required up to 4 L to maintain oxygen saturations in the low 90s  Physical Exam:   Gen: Comfortable on supplemental oxygen  HEENT: PERRL  O/P: Moist  Chest: Distant breath sounds, decreased at the right base  Improving from prior exam  Cardiac: Regular, no murmur  Abdomen: Soft and nontender  Bowel sounds are present    Extremities: No edema      Labs:  No new laboratory data    Imaging studies:  No new studies    Shaun Hernandez MD    Portions of the record may have been created with voice recognition software  Occasional wrong word or "sound a like" substitutions may have occurred due to the inherent limitations of voice recognition software  Read the chart carefully and recognize, using context, where substitutions have occurred

## 2017-04-13 NOTE — SOCIAL WORK
CM received call from patient's insurance informing CM that patient's policy is not in network with [employer], the available O2 providers are [facility], and [facility]  CM contacted [facility] to begin process, agency is able to accept patient, CM faxed orders and notes to [address] [place] [address]  No other needs at present  CM following

## 2017-04-13 NOTE — PHYSICAL THERAPY NOTE
PHYSICAL THERAPY NOTE          Patient Name: Harsha Garcia  [id number] Date: 4/13/2017 04/13/17 0930   Pain Assessment   Pain Assessment No/denies pain   Pain Score No Pain   Restrictions/Precautions   Other Precautions Fall Risk;O2;Impulsive   General   Chart Reviewed Yes   Response to Previous Treatment Patient reporting fatigue but able to participate  Family/Caregiver Present No   Cognition   Overall Cognitive Status WFL   Arousal/Participation Alert   Orientation Level Oriented X4   Following Commands Follows all commands and directions without difficulty   Subjective   Subjective no pain but sometimes my knees are bad L>R  I did feel I got a work out yesterday c 2 walks adn exercises this is good I don't like to just lay around  Bed Mobility   Rolling L 7  Independent   Supine to Sit 7  Independent   Sit to Supine 7  Independent   Transfers   Sit to Stand 7  Independent   Additional items Assist x 1;Verbal cues  (line safety,3 trials t/o )   Stand to Sit 7  Independent   Additional items Assist x 1;Verbal cues  (line safety at times 3 trials t/o)   Ambulation/Elevation   Gait pattern Decreased foot clearance  (toe out more L le)   Gait Assistance 6  Modified independent   Additional items Assist x 1   Assistive Device None   Distance 130feet stand rest (trial 3 liters vs 4 liters o2 destated 85%,back on 4 L recovered in few minutes 91% con't 200 feet 89/88% recovered to 90% c time did 200 feet 88% time to recover to 90%  con't 175feet 88% time to recover to 93% seated  Balance   Static Sitting Good   Dynamic Sitting Fair +   Static Standing Fair   Ambulatory Fair   Endurance Deficit   Endurance Deficit Yes   Activity Tolerance   Activity Tolerance Patient limited by fatigue   Nurse Made Aware brian rn x2  informed after also pulse ox c ambulation 3 +4 liters   Exercises   Hamstring Stretch Standing;5 reps;Bilateral;AROM  (counter support of ue's hs curls)   Hip Abduction 20 reps; Sitting;AROM; Bilateral   Hip Adduction 20 reps; Sitting;AROM; Bilateral   Knee AROM Long Arc Quad 20 reps; Sitting;AROM; Bilateral  (sets 5)   Ankle Pumps Sitting;20 reps;AROM; Bilateral   Squat 10 reps;Standing;AROM; Bilateral  (bue support of counter sba)   Marching 20 reps; Sitting;AROM; Bilateral  (sets 5)   Balance training  standing heel raises x 10 c counter support of ue's   Assessment   Prognosis Good   Problem List Decreased strength;Decreased endurance; Impaired balance;Decreased mobility; Decreased safety awareness   Assessment pt progressed c ambulation endurance +foot clearance c trial of her sneakers on, c stand rests as destats on trial of 3 liters increased back to 4 liters destated 88% recovered to 90- 91% c time each stand rest ending 88% recovered seated to 93% ending c time  did le therapeutic exercise c added standing heel raises  hs curls,small squats c ue support c counter and sba  no pain or dizziness  she returned to bed but encouraged sit t/o the day to A lung recovery  noted she does  will d/c home    Goals   Patient Goals full from meal but agreed to PT  not like to lay around,walks in room   STG Expiration Date 04/16/17   Treatment Day 2   Plan   Treatment/Interventions Functional transfer training;LE strengthening/ROM; Elevations; Therapeutic exercise; Endurance training;Patient/family training;Equipment eval/education; Bed mobility;Gait training;Spoke to nursing   Progress Progressing toward goals   PT Frequency 5x/wk   Recommendation   Recommendation Home independently  (may need home o2)   PT - OK to Discharge Yes

## 2017-04-15 LAB
BACTERIA BLD CULT: NORMAL
BACTERIA BLD CULT: NORMAL

## 2017-04-17 ENCOUNTER — GENERIC CONVERSION - ENCOUNTER (OUTPATIENT)
Dept: OTHER | Facility: OTHER | Age: 67
End: 2017-04-17

## 2017-04-20 ENCOUNTER — ALLSCRIPTS OFFICE VISIT (OUTPATIENT)
Dept: OTHER | Facility: OTHER | Age: 67
End: 2017-04-20

## 2017-04-25 ENCOUNTER — LAB CONVERSION - ENCOUNTER (OUTPATIENT)
Dept: OTHER | Facility: OTHER | Age: 67
End: 2017-04-25

## 2017-04-25 LAB
ADDITIONAL INFORMATION (HISTORICAL): NORMAL
ADEQUACY: (HISTORICAL): NORMAL
COMMENT (HISTORICAL): NORMAL
CYTOTECHNOLOGIST: (HISTORICAL): NORMAL
INTERPRETATION (HISTORICAL): NORMAL
LMP (HISTORICAL): NORMAL
PREV. BX: (HISTORICAL): NORMAL
PREV. PAP (HISTORICAL): NORMAL
SOURCE (HISTORICAL): NORMAL

## 2017-04-26 ENCOUNTER — GENERIC CONVERSION - ENCOUNTER (OUTPATIENT)
Dept: OTHER | Facility: OTHER | Age: 67
End: 2017-04-26

## 2017-04-26 ENCOUNTER — APPOINTMENT (OUTPATIENT)
Dept: RADIATION ONCOLOGY | Facility: CLINIC | Age: 67
End: 2017-04-26
Attending: RADIOLOGY
Payer: COMMERCIAL

## 2017-04-26 PROCEDURE — 99213 OFFICE O/P EST LOW 20 MIN: CPT | Performed by: RADIOLOGY

## 2017-05-01 ENCOUNTER — ALLSCRIPTS OFFICE VISIT (OUTPATIENT)
Dept: OTHER | Facility: OTHER | Age: 67
End: 2017-05-01

## 2017-05-02 ENCOUNTER — ANESTHESIA EVENT (OUTPATIENT)
Dept: GASTROENTEROLOGY | Facility: HOSPITAL | Age: 67
End: 2017-05-02

## 2017-05-02 ENCOUNTER — ALLSCRIPTS OFFICE VISIT (OUTPATIENT)
Dept: OTHER | Facility: OTHER | Age: 67
End: 2017-05-02

## 2017-05-02 RX ORDER — SODIUM CHLORIDE 9 MG/ML
20 INJECTION, SOLUTION INTRAVENOUS CONTINUOUS
Status: DISCONTINUED | OUTPATIENT
Start: 2017-05-03 | End: 2017-05-02 | Stop reason: HOSPADM

## 2017-05-03 ENCOUNTER — ANESTHESIA (OUTPATIENT)
Dept: GASTROENTEROLOGY | Facility: HOSPITAL | Age: 67
End: 2017-05-03

## 2017-05-03 ENCOUNTER — HOSPITAL ENCOUNTER (OUTPATIENT)
Dept: INFUSION CENTER | Facility: CLINIC | Age: 67
End: 2017-05-03
Payer: COMMERCIAL

## 2017-05-03 ENCOUNTER — HOSPITAL ENCOUNTER (OUTPATIENT)
Facility: HOSPITAL | Age: 67
Setting detail: OUTPATIENT SURGERY
Discharge: HOME | End: 2017-05-03
Attending: INTERNAL MEDICINE | Admitting: INTERNAL MEDICINE
Payer: COMMERCIAL

## 2017-05-03 VITALS
TEMPERATURE: 99.3 F | RESPIRATION RATE: 16 BRPM | BODY MASS INDEX: 22.18 KG/M2 | OXYGEN SATURATION: 94 % | HEIGHT: 66 IN | SYSTOLIC BLOOD PRESSURE: 111 MMHG | HEART RATE: 120 BPM | DIASTOLIC BLOOD PRESSURE: 55 MMHG | WEIGHT: 138 LBS

## 2017-05-03 DIAGNOSIS — C34.90 MALIGNANT NEOPLASM OF LUNG, UNSPECIFIED LATERALITY, UNSPECIFIED PART OF LUNG (HCC): ICD-10-CM

## 2017-05-03 LAB
HISTIOCYTES NFR FLD: 12 %
LYMPHOCYTES NFR BLD AUTO: 11 %
MONOCYTES NFR BLD AUTO: 10 %
NEUTS SEG NFR BLD AUTO: 67 %
TOTAL CELLS COUNTED SPEC: 100
WBC # FLD MANUAL: 67 /UL

## 2017-05-03 PROCEDURE — 87015 SPECIMEN INFECT AGNT CONCNTJ: CPT | Performed by: INTERNAL MEDICINE

## 2017-05-03 PROCEDURE — 87206 SMEAR FLUORESCENT/ACID STAI: CPT | Performed by: INTERNAL MEDICINE

## 2017-05-03 PROCEDURE — 89051 BODY FLUID CELL COUNT: CPT | Performed by: INTERNAL MEDICINE

## 2017-05-03 PROCEDURE — 87116 MYCOBACTERIA CULTURE: CPT | Performed by: INTERNAL MEDICINE

## 2017-05-03 PROCEDURE — 87102 FUNGUS ISOLATION CULTURE: CPT | Performed by: INTERNAL MEDICINE

## 2017-05-03 PROCEDURE — 87070 CULTURE OTHR SPECIMN AEROBIC: CPT | Performed by: INTERNAL MEDICINE

## 2017-05-03 RX ORDER — SODIUM CHLORIDE 9 MG/ML
125 INJECTION, SOLUTION INTRAVENOUS CONTINUOUS
Status: DISCONTINUED | OUTPATIENT
Start: 2017-05-03 | End: 2017-05-03 | Stop reason: HOSPADM

## 2017-05-03 RX ORDER — MIDAZOLAM HYDROCHLORIDE 1 MG/ML
INJECTION INTRAMUSCULAR; INTRAVENOUS CODE/TRAUMA/SEDATION MEDICATION
Status: COMPLETED | OUTPATIENT
Start: 2017-05-03 | End: 2017-05-03

## 2017-05-03 RX ORDER — LIDOCAINE HYDROCHLORIDE 40 MG/ML
SOLUTION TOPICAL CODE/TRAUMA/SEDATION MEDICATION
Status: COMPLETED | OUTPATIENT
Start: 2017-05-03 | End: 2017-05-03

## 2017-05-03 RX ORDER — FENTANYL CITRATE/PF 50 MCG/ML
SYRINGE (ML) INJECTION CODE/TRAUMA/SEDATION MEDICATION
Status: COMPLETED | OUTPATIENT
Start: 2017-05-03 | End: 2017-05-03

## 2017-05-03 RX ORDER — PREDNISONE 20 MG/1
20 TABLET ORAL DAILY
COMMUNITY
End: 2017-05-12 | Stop reason: ALTCHOICE

## 2017-05-03 RX ORDER — FENTANYL CITRATE 50 UG/ML
INJECTION, SOLUTION INTRAMUSCULAR; INTRAVENOUS CODE/TRAUMA/SEDATION MEDICATION
Status: COMPLETED | OUTPATIENT
Start: 2017-05-03 | End: 2017-05-03

## 2017-05-03 RX ADMIN — FENTANYL CITRATE 50 MCG: 50 INJECTION INTRAMUSCULAR; INTRAVENOUS at 13:14

## 2017-05-03 RX ADMIN — MIDAZOLAM HYDROCHLORIDE 1 MG: 1 INJECTION, SOLUTION INTRAMUSCULAR; INTRAVENOUS at 13:06

## 2017-05-03 RX ADMIN — LIDOCAINE HYDROCHLORIDE 10 ML: 20 SOLUTION ORAL; TOPICAL at 12:55

## 2017-05-03 RX ADMIN — FENTANYL CITRATE 50 MCG: 50 INJECTION, SOLUTION INTRAMUSCULAR; INTRAVENOUS at 13:03

## 2017-05-03 RX ADMIN — LIDOCAINE HYDROCHLORIDE 5 ML: 40 SOLUTION TOPICAL at 12:59

## 2017-05-03 RX ADMIN — MIDAZOLAM HYDROCHLORIDE 2 MG: 1 INJECTION, SOLUTION INTRAMUSCULAR; INTRAVENOUS at 13:14

## 2017-05-03 RX ADMIN — MIDAZOLAM HYDROCHLORIDE 1 MG: 1 INJECTION, SOLUTION INTRAMUSCULAR; INTRAVENOUS at 13:02

## 2017-05-03 RX ADMIN — SODIUM CHLORIDE 125 ML/HR: 0.9 INJECTION, SOLUTION INTRAVENOUS at 11:33

## 2017-05-04 LAB — P JIROVECII AG SPEC QL IF: NORMAL

## 2017-05-05 LAB
BACTERIA BRONCH AEROBE CULT: NORMAL
BACTERIA BRONCH AEROBE CULT: NORMAL
GRAM STN SPEC: NORMAL

## 2017-05-12 ENCOUNTER — APPOINTMENT (INPATIENT)
Dept: CT IMAGING | Facility: HOSPITAL | Age: 67
DRG: 205 | End: 2017-05-12
Payer: COMMERCIAL

## 2017-05-12 ENCOUNTER — APPOINTMENT (EMERGENCY)
Dept: RADIOLOGY | Facility: HOSPITAL | Age: 67
DRG: 205 | End: 2017-05-12
Payer: COMMERCIAL

## 2017-05-12 ENCOUNTER — HOSPITAL ENCOUNTER (INPATIENT)
Facility: HOSPITAL | Age: 67
LOS: 5 days | Discharge: HOME | DRG: 205 | End: 2017-05-17
Attending: EMERGENCY MEDICINE | Admitting: INTERNAL MEDICINE
Payer: COMMERCIAL

## 2017-05-12 DIAGNOSIS — J18.9 PNEUMONIA: Primary | ICD-10-CM

## 2017-05-12 DIAGNOSIS — J44.9 COPD (CHRONIC OBSTRUCTIVE PULMONARY DISEASE) (HCC): ICD-10-CM

## 2017-05-12 DIAGNOSIS — C34.90 SQUAMOUS CELL LUNG CANCER (HCC): ICD-10-CM

## 2017-05-12 DIAGNOSIS — J96.11 CHRONIC RESPIRATORY FAILURE WITH HYPOXIA (HCC): ICD-10-CM

## 2017-05-12 DIAGNOSIS — R06.02 SHORTNESS OF BREATH: ICD-10-CM

## 2017-05-12 LAB
ABO GROUP BLD: NORMAL
ALBUMIN SERPL BCP-MCNC: 1.9 G/DL (ref 3.5–5)
ALP SERPL-CCNC: 65 U/L (ref 46–116)
ALT SERPL W P-5'-P-CCNC: 23 U/L (ref 12–78)
ANION GAP SERPL CALCULATED.3IONS-SCNC: 7 MMOL/L (ref 4–13)
APTT PPP: 33 SECONDS (ref 23–35)
AST SERPL W P-5'-P-CCNC: 18 U/L (ref 5–45)
ATRIAL RATE: 108 BPM
BASOPHILS # BLD AUTO: 0 THOUSANDS/ΜL (ref 0–0.1)
BASOPHILS NFR BLD AUTO: 0 % (ref 0–1)
BILIRUB SERPL-MCNC: 0.61 MG/DL (ref 0.2–1)
BLD GP AB SCN SERPL QL: NEGATIVE
BUN SERPL-MCNC: 16 MG/DL (ref 5–25)
CALCIUM SERPL-MCNC: 9.1 MG/DL (ref 8.3–10.1)
CHLORIDE SERPL-SCNC: 99 MMOL/L (ref 100–108)
CO2 SERPL-SCNC: 30 MMOL/L (ref 21–32)
CREAT SERPL-MCNC: 1.25 MG/DL (ref 0.6–1.3)
EOSINOPHIL # BLD AUTO: 0.03 THOUSAND/ΜL (ref 0–0.61)
EOSINOPHIL NFR BLD AUTO: 1 % (ref 0–6)
ERYTHROCYTE [DISTWIDTH] IN BLOOD BY AUTOMATED COUNT: 16.3 % (ref 11.6–15.1)
FUNGUS SPEC CULT: NORMAL
GFR SERPL CREATININE-BSD FRML MDRD: 43 ML/MIN/1.73SQ M
GLUCOSE SERPL-MCNC: 103 MG/DL (ref 65–140)
HCT VFR BLD AUTO: 23.6 % (ref 34.8–46.1)
HGB BLD-MCNC: 7.3 G/DL (ref 11.5–15.4)
INR PPP: 1.27 (ref 0.86–1.16)
L PNEUMO1 AG UR QL IA.RAPID: NEGATIVE
LACTATE SERPL-SCNC: 1.5 MMOL/L (ref 0.5–2)
LYMPHOCYTES # BLD AUTO: 0.37 THOUSANDS/ΜL (ref 0.6–4.47)
LYMPHOCYTES NFR BLD AUTO: 7 % (ref 14–44)
MCH RBC QN AUTO: 30.7 PG (ref 26.8–34.3)
MCHC RBC AUTO-ENTMCNC: 30.9 G/DL (ref 31.4–37.4)
MCV RBC AUTO: 99 FL (ref 82–98)
MONOCYTES # BLD AUTO: 0.17 THOUSAND/ΜL (ref 0.17–1.22)
MONOCYTES NFR BLD AUTO: 3 % (ref 4–12)
NEUTROPHILS # BLD AUTO: 4.41 THOUSANDS/ΜL (ref 1.85–7.62)
NEUTS SEG NFR BLD AUTO: 89 % (ref 43–75)
NRBC BLD AUTO-RTO: 0 /100 WBCS
P AXIS: 65 DEGREES
PLATELET # BLD AUTO: 96 THOUSANDS/UL (ref 149–390)
PMV BLD AUTO: 9.4 FL (ref 8.9–12.7)
POTASSIUM SERPL-SCNC: 4.2 MMOL/L (ref 3.5–5.3)
PR INTERVAL: 122 MS
PROT SERPL-MCNC: 7.7 G/DL (ref 6.4–8.2)
PROTHROMBIN TIME: 16 SECONDS (ref 12.1–14.4)
QRS AXIS: 57 DEGREES
QRSD INTERVAL: 76 MS
QT INTERVAL: 308 MS
QTC INTERVAL: 412 MS
RBC # BLD AUTO: 2.38 MILLION/UL (ref 3.81–5.12)
RH BLD: POSITIVE
S PNEUM AG UR QL: NEGATIVE
SODIUM SERPL-SCNC: 136 MMOL/L (ref 136–145)
SPECIMEN EXPIRATION DATE: NORMAL
SPECIMEN SOURCE: NORMAL
T WAVE AXIS: 56 DEGREES
TROPONIN I BLD-MCNC: 0.02 NG/ML (ref 0–0.08)
VENTRICULAR RATE: 108 BPM
WBC # BLD AUTO: 4.98 THOUSAND/UL (ref 4.31–10.16)

## 2017-05-12 PROCEDURE — 84484 ASSAY OF TROPONIN QUANT: CPT

## 2017-05-12 PROCEDURE — 99285 EMERGENCY DEPT VISIT HI MDM: CPT

## 2017-05-12 PROCEDURE — 85025 COMPLETE CBC W/AUTO DIFF WBC: CPT | Performed by: EMERGENCY MEDICINE

## 2017-05-12 PROCEDURE — 85610 PROTHROMBIN TIME: CPT | Performed by: EMERGENCY MEDICINE

## 2017-05-12 PROCEDURE — P9021 RED BLOOD CELLS UNIT: HCPCS

## 2017-05-12 PROCEDURE — 30233N1 TRANSFUSION OF NONAUTOLOGOUS RED BLOOD CELLS INTO PERIPHERAL VEIN, PERCUTANEOUS APPROACH: ICD-10-PCS | Performed by: INTERNAL MEDICINE

## 2017-05-12 PROCEDURE — 71020 HB CHEST X-RAY 2VW FRONTAL&LATL: CPT

## 2017-05-12 PROCEDURE — 86850 RBC ANTIBODY SCREEN: CPT | Performed by: PHYSICIAN ASSISTANT

## 2017-05-12 PROCEDURE — 36415 COLL VENOUS BLD VENIPUNCTURE: CPT | Performed by: EMERGENCY MEDICINE

## 2017-05-12 PROCEDURE — 94760 N-INVAS EAR/PLS OXIMETRY 1: CPT

## 2017-05-12 PROCEDURE — 86900 BLOOD TYPING SEROLOGIC ABO: CPT | Performed by: PHYSICIAN ASSISTANT

## 2017-05-12 PROCEDURE — 87449 NOS EACH ORGANISM AG IA: CPT | Performed by: PHYSICIAN ASSISTANT

## 2017-05-12 PROCEDURE — 86923 COMPATIBILITY TEST ELECTRIC: CPT

## 2017-05-12 PROCEDURE — 80053 COMPREHEN METABOLIC PANEL: CPT | Performed by: EMERGENCY MEDICINE

## 2017-05-12 PROCEDURE — 93005 ELECTROCARDIOGRAM TRACING: CPT | Performed by: EMERGENCY MEDICINE

## 2017-05-12 PROCEDURE — 86901 BLOOD TYPING SEROLOGIC RH(D): CPT | Performed by: PHYSICIAN ASSISTANT

## 2017-05-12 PROCEDURE — 83605 ASSAY OF LACTIC ACID: CPT | Performed by: EMERGENCY MEDICINE

## 2017-05-12 PROCEDURE — 87040 BLOOD CULTURE FOR BACTERIA: CPT | Performed by: EMERGENCY MEDICINE

## 2017-05-12 PROCEDURE — 94640 AIRWAY INHALATION TREATMENT: CPT

## 2017-05-12 PROCEDURE — 71250 CT THORAX DX C-: CPT

## 2017-05-12 PROCEDURE — 85730 THROMBOPLASTIN TIME PARTIAL: CPT | Performed by: EMERGENCY MEDICINE

## 2017-05-12 RX ORDER — VANCOMYCIN HYDROCHLORIDE 1 G/200ML
15 INJECTION, SOLUTION INTRAVENOUS EVERY 24 HOURS
Status: DISCONTINUED | OUTPATIENT
Start: 2017-05-12 | End: 2017-05-12

## 2017-05-12 RX ORDER — OMEGA-3S/DHA/EPA/FISH OIL/D3 300MG-1000
400 CAPSULE ORAL DAILY
Status: DISCONTINUED | OUTPATIENT
Start: 2017-05-12 | End: 2017-05-17 | Stop reason: HOSPADM

## 2017-05-12 RX ORDER — KRILL/OM-3/DHA/EPA/PHOSPHO/AST 500MG-86MG
500 CAPSULE ORAL DAILY
Status: DISCONTINUED | OUTPATIENT
Start: 2017-05-12 | End: 2017-05-12

## 2017-05-12 RX ORDER — LEVALBUTEROL 1.25 MG/.5ML
1.25 SOLUTION, CONCENTRATE RESPIRATORY (INHALATION) EVERY 6 HOURS PRN
Status: DISCONTINUED | OUTPATIENT
Start: 2017-05-12 | End: 2017-05-12

## 2017-05-12 RX ORDER — CALCIUM CARBONATE 200(500)MG
1000 TABLET,CHEWABLE ORAL DAILY PRN
Status: DISCONTINUED | OUTPATIENT
Start: 2017-05-12 | End: 2017-05-17 | Stop reason: HOSPADM

## 2017-05-12 RX ORDER — GUAIFENESIN 600 MG
600 TABLET, EXTENDED RELEASE 12 HR ORAL 2 TIMES DAILY
Status: DISCONTINUED | OUTPATIENT
Start: 2017-05-12 | End: 2017-05-17 | Stop reason: HOSPADM

## 2017-05-12 RX ORDER — MAGNESIUM HYDROXIDE/ALUMINUM HYDROXICE/SIMETHICONE 120; 1200; 1200 MG/30ML; MG/30ML; MG/30ML
30 SUSPENSION ORAL EVERY 6 HOURS PRN
Status: DISCONTINUED | OUTPATIENT
Start: 2017-05-12 | End: 2017-05-17 | Stop reason: HOSPADM

## 2017-05-12 RX ORDER — CALCIUM CARBONATE 500(1250)
1 TABLET ORAL
Status: DISCONTINUED | OUTPATIENT
Start: 2017-05-13 | End: 2017-05-17 | Stop reason: HOSPADM

## 2017-05-12 RX ORDER — ACETAMINOPHEN 325 MG/1
650 TABLET ORAL EVERY 6 HOURS PRN
Status: DISCONTINUED | OUTPATIENT
Start: 2017-05-12 | End: 2017-05-17 | Stop reason: HOSPADM

## 2017-05-12 RX ORDER — IPRATROPIUM BROMIDE AND ALBUTEROL SULFATE 2.5; .5 MG/3ML; MG/3ML
3 SOLUTION RESPIRATORY (INHALATION)
Status: DISCONTINUED | OUTPATIENT
Start: 2017-05-12 | End: 2017-05-17 | Stop reason: HOSPADM

## 2017-05-12 RX ORDER — BUDESONIDE AND FORMOTEROL FUMARATE DIHYDRATE 160; 4.5 UG/1; UG/1
2 AEROSOL RESPIRATORY (INHALATION) 2 TIMES DAILY
Status: DISCONTINUED | OUTPATIENT
Start: 2017-05-12 | End: 2017-05-17 | Stop reason: HOSPADM

## 2017-05-12 RX ORDER — PERPHENAZINE/AMITRIPTYLINE HCL 4MG-10MG
1 TABLET ORAL DAILY
Status: DISCONTINUED | OUTPATIENT
Start: 2017-05-12 | End: 2017-05-12

## 2017-05-12 RX ORDER — HEPARIN SODIUM 5000 [USP'U]/ML
5000 INJECTION, SOLUTION INTRAVENOUS; SUBCUTANEOUS EVERY 8 HOURS SCHEDULED
Status: DISCONTINUED | OUTPATIENT
Start: 2017-05-12 | End: 2017-05-17 | Stop reason: HOSPADM

## 2017-05-12 RX ORDER — ONDANSETRON 2 MG/ML
4 INJECTION INTRAMUSCULAR; INTRAVENOUS EVERY 6 HOURS PRN
Status: DISCONTINUED | OUTPATIENT
Start: 2017-05-12 | End: 2017-05-17 | Stop reason: HOSPADM

## 2017-05-12 RX ORDER — AZITHROMYCIN 250 MG/1
500 TABLET, FILM COATED ORAL EVERY 24 HOURS
Status: DISCONTINUED | OUTPATIENT
Start: 2017-05-12 | End: 2017-05-12

## 2017-05-12 RX ORDER — VANCOMYCIN HYDROCHLORIDE 1 G/200ML
15 INJECTION, SOLUTION INTRAVENOUS ONCE
Status: DISCONTINUED | OUTPATIENT
Start: 2017-05-12 | End: 2017-05-12

## 2017-05-12 RX ORDER — FERROUS SULFATE 325(65) MG
325 TABLET ORAL
Status: DISCONTINUED | OUTPATIENT
Start: 2017-05-13 | End: 2017-05-14

## 2017-05-12 RX ORDER — TURMERIC ROOT EXTRACT 500 MG
500 TABLET ORAL DAILY
Status: DISCONTINUED | OUTPATIENT
Start: 2017-05-12 | End: 2017-05-12

## 2017-05-12 RX ORDER — AZITHROMYCIN 200 MG/5ML
500 POWDER, FOR SUSPENSION ORAL ONCE
Status: COMPLETED | OUTPATIENT
Start: 2017-05-12 | End: 2017-05-12

## 2017-05-12 RX ORDER — SODIUM CHLORIDE FOR INHALATION 0.9 %
3 VIAL, NEBULIZER (ML) INHALATION EVERY 6 HOURS PRN
Status: DISCONTINUED | OUTPATIENT
Start: 2017-05-12 | End: 2017-05-13

## 2017-05-12 RX ORDER — ECHINACEA 400 MG
1 CAPSULE ORAL DAILY
Status: DISCONTINUED | OUTPATIENT
Start: 2017-05-12 | End: 2017-05-12

## 2017-05-12 RX ADMIN — CHOLECALCIFEROL TAB 10 MCG (400 UNIT) 400 UNITS: 10 TAB at 14:09

## 2017-05-12 RX ADMIN — BUDESONIDE AND FORMOTEROL FUMARATE DIHYDRATE 2 PUFF: 160; 4.5 AEROSOL RESPIRATORY (INHALATION) at 14:08

## 2017-05-12 RX ADMIN — IPRATROPIUM BROMIDE AND ALBUTEROL SULFATE 3 ML: .5; 3 SOLUTION RESPIRATORY (INHALATION) at 22:05

## 2017-05-12 RX ADMIN — HEPARIN SODIUM 5000 UNITS: 5000 INJECTION, SOLUTION INTRAVENOUS; SUBCUTANEOUS at 14:10

## 2017-05-12 RX ADMIN — BUDESONIDE AND FORMOTEROL FUMARATE DIHYDRATE 2 PUFF: 160; 4.5 AEROSOL RESPIRATORY (INHALATION) at 18:16

## 2017-05-12 RX ADMIN — CEFEPIME 2000 MG: 2 INJECTION, POWDER, FOR SOLUTION INTRAMUSCULAR; INTRAVENOUS at 11:24

## 2017-05-12 RX ADMIN — GUAIFENESIN 600 MG: 600 TABLET, EXTENDED RELEASE ORAL at 14:09

## 2017-05-12 RX ADMIN — AZITHROMYCIN MONOHYDRATE 500 MG: 250 TABLET ORAL at 14:10

## 2017-05-12 RX ADMIN — Medication 1 TABLET: at 14:09

## 2017-05-12 RX ADMIN — GUAIFENESIN 600 MG: 600 TABLET, EXTENDED RELEASE ORAL at 18:16

## 2017-05-12 RX ADMIN — HEPARIN SODIUM 5000 UNITS: 5000 INJECTION, SOLUTION INTRAVENOUS; SUBCUTANEOUS at 21:22

## 2017-05-12 RX ADMIN — AZITHROMYCIN 500 MG: 1200 POWDER, FOR SUSPENSION ORAL at 11:27

## 2017-05-12 RX ADMIN — ACETAMINOPHEN 650 MG: 325 TABLET, FILM COATED ORAL at 16:18

## 2017-05-13 LAB
ABO GROUP BLD BPU: NORMAL
ANION GAP SERPL CALCULATED.3IONS-SCNC: 6 MMOL/L (ref 4–13)
BPU ID: NORMAL
BUN SERPL-MCNC: 18 MG/DL (ref 5–25)
CALCIUM SERPL-MCNC: 8.4 MG/DL (ref 8.3–10.1)
CHLORIDE SERPL-SCNC: 102 MMOL/L (ref 100–108)
CO2 SERPL-SCNC: 28 MMOL/L (ref 21–32)
CREAT SERPL-MCNC: 1.06 MG/DL (ref 0.6–1.3)
ERYTHROCYTE [DISTWIDTH] IN BLOOD BY AUTOMATED COUNT: 16.4 % (ref 11.6–15.1)
GFR SERPL CREATININE-BSD FRML MDRD: 52 ML/MIN/1.73SQ M
GLUCOSE SERPL-MCNC: 122 MG/DL (ref 65–140)
HCT VFR BLD AUTO: 21.2 % (ref 34.8–46.1)
HGB BLD-MCNC: 6.5 G/DL (ref 11.5–15.4)
MCH RBC QN AUTO: 30.1 PG (ref 26.8–34.3)
MCHC RBC AUTO-ENTMCNC: 30.7 G/DL (ref 31.4–37.4)
MCV RBC AUTO: 98 FL (ref 82–98)
PLATELET # BLD AUTO: 88 THOUSANDS/UL (ref 149–390)
PMV BLD AUTO: 10 FL (ref 8.9–12.7)
POTASSIUM SERPL-SCNC: 3.9 MMOL/L (ref 3.5–5.3)
RBC # BLD AUTO: 2.16 MILLION/UL (ref 3.81–5.12)
SODIUM SERPL-SCNC: 136 MMOL/L (ref 136–145)
UNIT DISPENSE STATUS: NORMAL
UNIT PRODUCT CODE: NORMAL
UNIT RH: NORMAL
WBC # BLD AUTO: 3.08 THOUSAND/UL (ref 4.31–10.16)

## 2017-05-13 PROCEDURE — 97163 PT EVAL HIGH COMPLEX 45 MIN: CPT

## 2017-05-13 PROCEDURE — 85027 COMPLETE CBC AUTOMATED: CPT | Performed by: PHYSICIAN ASSISTANT

## 2017-05-13 PROCEDURE — G8978 MOBILITY CURRENT STATUS: HCPCS

## 2017-05-13 PROCEDURE — 94760 N-INVAS EAR/PLS OXIMETRY 1: CPT

## 2017-05-13 PROCEDURE — P9016 RBC LEUKOCYTES REDUCED: HCPCS

## 2017-05-13 PROCEDURE — 94668 MNPJ CHEST WALL SBSQ: CPT

## 2017-05-13 PROCEDURE — 94640 AIRWAY INHALATION TREATMENT: CPT

## 2017-05-13 PROCEDURE — P9040 RBC LEUKOREDUCED IRRADIATED: HCPCS

## 2017-05-13 PROCEDURE — G8979 MOBILITY GOAL STATUS: HCPCS

## 2017-05-13 PROCEDURE — 80048 BASIC METABOLIC PNL TOTAL CA: CPT | Performed by: PHYSICIAN ASSISTANT

## 2017-05-13 RX ORDER — PREDNISONE 20 MG/1
60 TABLET ORAL DAILY
Status: DISCONTINUED | OUTPATIENT
Start: 2017-05-13 | End: 2017-05-17 | Stop reason: HOSPADM

## 2017-05-13 RX ADMIN — GUAIFENESIN 600 MG: 600 TABLET, EXTENDED RELEASE ORAL at 17:32

## 2017-05-13 RX ADMIN — BUDESONIDE AND FORMOTEROL FUMARATE DIHYDRATE 2 PUFF: 160; 4.5 AEROSOL RESPIRATORY (INHALATION) at 17:35

## 2017-05-13 RX ADMIN — IPRATROPIUM BROMIDE AND ALBUTEROL SULFATE 3 ML: .5; 3 SOLUTION RESPIRATORY (INHALATION) at 08:28

## 2017-05-13 RX ADMIN — BUDESONIDE AND FORMOTEROL FUMARATE DIHYDRATE 2 PUFF: 160; 4.5 AEROSOL RESPIRATORY (INHALATION) at 08:01

## 2017-05-13 RX ADMIN — IPRATROPIUM BROMIDE AND ALBUTEROL SULFATE 3 ML: .5; 3 SOLUTION RESPIRATORY (INHALATION) at 14:14

## 2017-05-13 RX ADMIN — CEFEPIME HYDROCHLORIDE 2000 MG: 2 INJECTION, POWDER, FOR SOLUTION INTRAVENOUS at 23:45

## 2017-05-13 RX ADMIN — PREDNISONE 60 MG: 20 TABLET ORAL at 17:32

## 2017-05-13 RX ADMIN — Medication 1 TABLET: at 08:01

## 2017-05-13 RX ADMIN — HEPARIN SODIUM 5000 UNITS: 5000 INJECTION, SOLUTION INTRAVENOUS; SUBCUTANEOUS at 05:48

## 2017-05-13 RX ADMIN — Medication 1 TABLET: at 07:58

## 2017-05-13 RX ADMIN — IPRATROPIUM BROMIDE AND ALBUTEROL SULFATE 3 ML: .5; 3 SOLUTION RESPIRATORY (INHALATION) at 04:08

## 2017-05-13 RX ADMIN — CEFEPIME HYDROCHLORIDE 2000 MG: 2 INJECTION, POWDER, FOR SOLUTION INTRAVENOUS at 13:55

## 2017-05-13 RX ADMIN — FERROUS SULFATE TAB 325 MG (65 MG ELEMENTAL FE) 325 MG: 325 (65 FE) TAB at 07:58

## 2017-05-13 RX ADMIN — CHOLECALCIFEROL TAB 10 MCG (400 UNIT) 400 UNITS: 10 TAB at 08:01

## 2017-05-13 RX ADMIN — CEFEPIME HYDROCHLORIDE 2000 MG: 2 INJECTION, POWDER, FOR SOLUTION INTRAVENOUS at 00:41

## 2017-05-13 RX ADMIN — HEPARIN SODIUM 5000 UNITS: 5000 INJECTION, SOLUTION INTRAVENOUS; SUBCUTANEOUS at 15:15

## 2017-05-13 RX ADMIN — HEPARIN SODIUM 5000 UNITS: 5000 INJECTION, SOLUTION INTRAVENOUS; SUBCUTANEOUS at 21:56

## 2017-05-13 RX ADMIN — GUAIFENESIN 600 MG: 600 TABLET, EXTENDED RELEASE ORAL at 08:01

## 2017-05-13 RX ADMIN — IPRATROPIUM BROMIDE AND ALBUTEROL SULFATE 3 ML: .5; 3 SOLUTION RESPIRATORY (INHALATION) at 20:57

## 2017-05-14 LAB
ABO GROUP BLD BPU: NORMAL
ANISOCYTOSIS BLD QL SMEAR: PRESENT
BASOPHILS # BLD MANUAL: 0 THOUSAND/UL (ref 0–0.1)
BASOPHILS NFR MAR MANUAL: 0 % (ref 0–1)
BPU ID: NORMAL
EOSINOPHIL # BLD MANUAL: 0.04 THOUSAND/UL (ref 0–0.4)
EOSINOPHIL NFR BLD MANUAL: 1 % (ref 0–6)
ERYTHROCYTE [DISTWIDTH] IN BLOOD BY AUTOMATED COUNT: 19.8 % (ref 11.6–15.1)
HCT VFR BLD AUTO: 29.6 % (ref 34.8–46.1)
HGB BLD-MCNC: 9.2 G/DL (ref 11.5–15.4)
LYMPHOCYTES # BLD AUTO: 0.15 THOUSAND/UL (ref 0.6–4.47)
LYMPHOCYTES # BLD AUTO: 4 % (ref 14–44)
MCH RBC QN AUTO: 29.1 PG (ref 26.8–34.3)
MCHC RBC AUTO-ENTMCNC: 31.1 G/DL (ref 31.4–37.4)
MCV RBC AUTO: 94 FL (ref 82–98)
MONOCYTES # BLD AUTO: 0 THOUSAND/UL (ref 0–1.22)
MONOCYTES NFR BLD: 0 % (ref 4–12)
NEUTROPHILS # BLD MANUAL: 3.64 THOUSAND/UL (ref 1.85–7.62)
NEUTS BAND NFR BLD MANUAL: 2 % (ref 0–8)
NEUTS SEG NFR BLD AUTO: 93 % (ref 43–75)
PLATELET # BLD AUTO: 80 THOUSANDS/UL (ref 149–390)
PLATELET BLD QL SMEAR: ABNORMAL
PMV BLD AUTO: 9.7 FL (ref 8.9–12.7)
POLYCHROMASIA BLD QL SMEAR: PRESENT
RBC # BLD AUTO: 3.16 MILLION/UL (ref 3.81–5.12)
TOTAL CELLS COUNTED SPEC: 100
UNIT DISPENSE STATUS: NORMAL
UNIT PRODUCT CODE: NORMAL
UNIT RH: NORMAL
WBC # BLD AUTO: 3.83 THOUSAND/UL (ref 4.31–10.16)

## 2017-05-14 PROCEDURE — 94760 N-INVAS EAR/PLS OXIMETRY 1: CPT

## 2017-05-14 PROCEDURE — 94640 AIRWAY INHALATION TREATMENT: CPT

## 2017-05-14 PROCEDURE — 85027 COMPLETE CBC AUTOMATED: CPT | Performed by: INTERNAL MEDICINE

## 2017-05-14 PROCEDURE — 85007 BL SMEAR W/DIFF WBC COUNT: CPT | Performed by: INTERNAL MEDICINE

## 2017-05-14 RX ORDER — FERROUS SULFATE 325(65) MG
325 TABLET ORAL 2 TIMES DAILY WITH MEALS
Status: DISCONTINUED | OUTPATIENT
Start: 2017-05-14 | End: 2017-05-17 | Stop reason: HOSPADM

## 2017-05-14 RX ADMIN — CHOLECALCIFEROL TAB 10 MCG (400 UNIT) 400 UNITS: 10 TAB at 08:51

## 2017-05-14 RX ADMIN — HEPARIN SODIUM 5000 UNITS: 5000 INJECTION, SOLUTION INTRAVENOUS; SUBCUTANEOUS at 21:31

## 2017-05-14 RX ADMIN — FERROUS SULFATE TAB 325 MG (65 MG ELEMENTAL FE) 325 MG: 325 (65 FE) TAB at 17:11

## 2017-05-14 RX ADMIN — CEFEPIME HYDROCHLORIDE 2000 MG: 2 INJECTION, POWDER, FOR SOLUTION INTRAVENOUS at 13:14

## 2017-05-14 RX ADMIN — Medication 1 TABLET: at 08:49

## 2017-05-14 RX ADMIN — CEFEPIME HYDROCHLORIDE 2000 MG: 2 INJECTION, POWDER, FOR SOLUTION INTRAVENOUS at 23:27

## 2017-05-14 RX ADMIN — HEPARIN SODIUM 5000 UNITS: 5000 INJECTION, SOLUTION INTRAVENOUS; SUBCUTANEOUS at 13:51

## 2017-05-14 RX ADMIN — BUDESONIDE AND FORMOTEROL FUMARATE DIHYDRATE 2 PUFF: 160; 4.5 AEROSOL RESPIRATORY (INHALATION) at 17:11

## 2017-05-14 RX ADMIN — GUAIFENESIN 600 MG: 600 TABLET, EXTENDED RELEASE ORAL at 17:11

## 2017-05-14 RX ADMIN — IPRATROPIUM BROMIDE AND ALBUTEROL SULFATE 3 ML: .5; 3 SOLUTION RESPIRATORY (INHALATION) at 19:32

## 2017-05-14 RX ADMIN — GUAIFENESIN 600 MG: 600 TABLET, EXTENDED RELEASE ORAL at 08:49

## 2017-05-14 RX ADMIN — BUDESONIDE AND FORMOTEROL FUMARATE DIHYDRATE 2 PUFF: 160; 4.5 AEROSOL RESPIRATORY (INHALATION) at 08:51

## 2017-05-14 RX ADMIN — PREDNISONE 60 MG: 20 TABLET ORAL at 08:49

## 2017-05-14 RX ADMIN — IPRATROPIUM BROMIDE AND ALBUTEROL SULFATE 3 ML: .5; 3 SOLUTION RESPIRATORY (INHALATION) at 14:39

## 2017-05-14 RX ADMIN — HEPARIN SODIUM 5000 UNITS: 5000 INJECTION, SOLUTION INTRAVENOUS; SUBCUTANEOUS at 05:09

## 2017-05-14 RX ADMIN — IPRATROPIUM BROMIDE AND ALBUTEROL SULFATE 3 ML: .5; 3 SOLUTION RESPIRATORY (INHALATION) at 07:59

## 2017-05-14 RX ADMIN — IPRATROPIUM BROMIDE AND ALBUTEROL SULFATE 3 ML: .5; 3 SOLUTION RESPIRATORY (INHALATION) at 02:00

## 2017-05-14 RX ADMIN — FERROUS SULFATE TAB 325 MG (65 MG ELEMENTAL FE) 325 MG: 325 (65 FE) TAB at 08:49

## 2017-05-15 ENCOUNTER — APPOINTMENT (INPATIENT)
Dept: PHYSICAL THERAPY | Facility: HOSPITAL | Age: 67
DRG: 205 | End: 2017-05-15
Payer: COMMERCIAL

## 2017-05-15 LAB
ANISOCYTOSIS BLD QL SMEAR: PRESENT
BASOPHILS # BLD MANUAL: 0 THOUSAND/UL (ref 0–0.1)
BASOPHILS NFR MAR MANUAL: 0 % (ref 0–1)
EOSINOPHIL # BLD MANUAL: 0 THOUSAND/UL (ref 0–0.4)
EOSINOPHIL NFR BLD MANUAL: 0 % (ref 0–6)
ERYTHROCYTE [DISTWIDTH] IN BLOOD BY AUTOMATED COUNT: 19.4 % (ref 11.6–15.1)
HCT VFR BLD AUTO: 26.7 % (ref 34.8–46.1)
HGB BLD-MCNC: 8.4 G/DL (ref 11.5–15.4)
HYPERCHROMIA BLD QL SMEAR: PRESENT
LYMPHOCYTES # BLD AUTO: 0.43 THOUSAND/UL (ref 0.6–4.47)
LYMPHOCYTES # BLD AUTO: 8 % (ref 14–44)
MCH RBC QN AUTO: 29.2 PG (ref 26.8–34.3)
MCHC RBC AUTO-ENTMCNC: 31.5 G/DL (ref 31.4–37.4)
MCV RBC AUTO: 93 FL (ref 82–98)
MONOCYTES # BLD AUTO: 0.05 THOUSAND/UL (ref 0–1.22)
MONOCYTES NFR BLD: 1 % (ref 4–12)
NEUTROPHILS # BLD MANUAL: 4.92 THOUSAND/UL (ref 1.85–7.62)
NEUTS BAND NFR BLD MANUAL: 4 % (ref 0–8)
NEUTS SEG NFR BLD AUTO: 87 % (ref 43–75)
PLATELET # BLD AUTO: 83 THOUSANDS/UL (ref 149–390)
PLATELET BLD QL SMEAR: ABNORMAL
PMV BLD AUTO: 10.4 FL (ref 8.9–12.7)
RBC # BLD AUTO: 2.88 MILLION/UL (ref 3.81–5.12)
TOTAL CELLS COUNTED SPEC: 100
WBC # BLD AUTO: 5.41 THOUSAND/UL (ref 4.31–10.16)

## 2017-05-15 PROCEDURE — 97116 GAIT TRAINING THERAPY: CPT

## 2017-05-15 PROCEDURE — 97110 THERAPEUTIC EXERCISES: CPT

## 2017-05-15 PROCEDURE — 97530 THERAPEUTIC ACTIVITIES: CPT

## 2017-05-15 PROCEDURE — 85007 BL SMEAR W/DIFF WBC COUNT: CPT | Performed by: INTERNAL MEDICINE

## 2017-05-15 PROCEDURE — 94761 N-INVAS EAR/PLS OXIMETRY MLT: CPT

## 2017-05-15 PROCEDURE — 85027 COMPLETE CBC AUTOMATED: CPT | Performed by: INTERNAL MEDICINE

## 2017-05-15 PROCEDURE — 94760 N-INVAS EAR/PLS OXIMETRY 1: CPT

## 2017-05-15 PROCEDURE — 94640 AIRWAY INHALATION TREATMENT: CPT

## 2017-05-15 RX ADMIN — CEFEPIME HYDROCHLORIDE 2000 MG: 2 INJECTION, POWDER, FOR SOLUTION INTRAVENOUS at 23:24

## 2017-05-15 RX ADMIN — HEPARIN SODIUM 5000 UNITS: 5000 INJECTION, SOLUTION INTRAVENOUS; SUBCUTANEOUS at 14:37

## 2017-05-15 RX ADMIN — PREDNISONE 60 MG: 20 TABLET ORAL at 08:35

## 2017-05-15 RX ADMIN — IPRATROPIUM BROMIDE AND ALBUTEROL SULFATE 3 ML: .5; 3 SOLUTION RESPIRATORY (INHALATION) at 07:52

## 2017-05-15 RX ADMIN — Medication 1 TABLET: at 08:36

## 2017-05-15 RX ADMIN — GUAIFENESIN 600 MG: 600 TABLET, EXTENDED RELEASE ORAL at 08:35

## 2017-05-15 RX ADMIN — FERROUS SULFATE TAB 325 MG (65 MG ELEMENTAL FE) 325 MG: 325 (65 FE) TAB at 15:58

## 2017-05-15 RX ADMIN — IPRATROPIUM BROMIDE AND ALBUTEROL SULFATE 3 ML: .5; 3 SOLUTION RESPIRATORY (INHALATION) at 13:00

## 2017-05-15 RX ADMIN — CHOLECALCIFEROL TAB 10 MCG (400 UNIT) 400 UNITS: 10 TAB at 08:35

## 2017-05-15 RX ADMIN — ALUMINUM HYDROXIDE, MAGNESIUM HYDROXIDE, AND SIMETHICONE 30 ML: 200; 200; 20 SUSPENSION ORAL at 00:56

## 2017-05-15 RX ADMIN — ALUMINUM HYDROXIDE, MAGNESIUM HYDROXIDE, AND SIMETHICONE 30 ML: 200; 200; 20 SUSPENSION ORAL at 20:15

## 2017-05-15 RX ADMIN — HEPARIN SODIUM 5000 UNITS: 5000 INJECTION, SOLUTION INTRAVENOUS; SUBCUTANEOUS at 05:34

## 2017-05-15 RX ADMIN — Medication 1 TABLET: at 08:35

## 2017-05-15 RX ADMIN — BUDESONIDE AND FORMOTEROL FUMARATE DIHYDRATE 2 PUFF: 160; 4.5 AEROSOL RESPIRATORY (INHALATION) at 08:35

## 2017-05-15 RX ADMIN — IPRATROPIUM BROMIDE AND ALBUTEROL SULFATE 3 ML: .5; 3 SOLUTION RESPIRATORY (INHALATION) at 20:45

## 2017-05-15 RX ADMIN — IPRATROPIUM BROMIDE AND ALBUTEROL SULFATE 3 ML: .5; 3 SOLUTION RESPIRATORY (INHALATION) at 00:41

## 2017-05-15 RX ADMIN — HEPARIN SODIUM 5000 UNITS: 5000 INJECTION, SOLUTION INTRAVENOUS; SUBCUTANEOUS at 21:45

## 2017-05-15 RX ADMIN — CEFEPIME HYDROCHLORIDE 2000 MG: 2 INJECTION, POWDER, FOR SOLUTION INTRAVENOUS at 12:52

## 2017-05-15 RX ADMIN — GUAIFENESIN 600 MG: 600 TABLET, EXTENDED RELEASE ORAL at 18:13

## 2017-05-15 RX ADMIN — BUDESONIDE AND FORMOTEROL FUMARATE DIHYDRATE 2 PUFF: 160; 4.5 AEROSOL RESPIRATORY (INHALATION) at 18:14

## 2017-05-15 RX ADMIN — FERROUS SULFATE TAB 325 MG (65 MG ELEMENTAL FE) 325 MG: 325 (65 FE) TAB at 08:35

## 2017-05-16 ENCOUNTER — APPOINTMENT (INPATIENT)
Dept: PHYSICAL THERAPY | Facility: HOSPITAL | Age: 67
DRG: 205 | End: 2017-05-16
Payer: COMMERCIAL

## 2017-05-16 PROCEDURE — 97530 THERAPEUTIC ACTIVITIES: CPT

## 2017-05-16 PROCEDURE — 97116 GAIT TRAINING THERAPY: CPT

## 2017-05-16 PROCEDURE — 94668 MNPJ CHEST WALL SBSQ: CPT

## 2017-05-16 PROCEDURE — 94640 AIRWAY INHALATION TREATMENT: CPT

## 2017-05-16 PROCEDURE — 94760 N-INVAS EAR/PLS OXIMETRY 1: CPT

## 2017-05-16 RX ADMIN — IPRATROPIUM BROMIDE AND ALBUTEROL SULFATE 3 ML: .5; 3 SOLUTION RESPIRATORY (INHALATION) at 08:29

## 2017-05-16 RX ADMIN — CEFEPIME HYDROCHLORIDE 2000 MG: 2 INJECTION, POWDER, FOR SOLUTION INTRAVENOUS at 11:57

## 2017-05-16 RX ADMIN — FERROUS SULFATE TAB 325 MG (65 MG ELEMENTAL FE) 325 MG: 325 (65 FE) TAB at 07:54

## 2017-05-16 RX ADMIN — BUDESONIDE AND FORMOTEROL FUMARATE DIHYDRATE 2 PUFF: 160; 4.5 AEROSOL RESPIRATORY (INHALATION) at 08:52

## 2017-05-16 RX ADMIN — BUDESONIDE AND FORMOTEROL FUMARATE DIHYDRATE 2 PUFF: 160; 4.5 AEROSOL RESPIRATORY (INHALATION) at 17:11

## 2017-05-16 RX ADMIN — CEFEPIME HYDROCHLORIDE 2000 MG: 2 INJECTION, POWDER, FOR SOLUTION INTRAVENOUS at 23:17

## 2017-05-16 RX ADMIN — GUAIFENESIN 600 MG: 600 TABLET, EXTENDED RELEASE ORAL at 08:52

## 2017-05-16 RX ADMIN — Medication 1 TABLET: at 08:52

## 2017-05-16 RX ADMIN — GUAIFENESIN 600 MG: 600 TABLET, EXTENDED RELEASE ORAL at 17:11

## 2017-05-16 RX ADMIN — FERROUS SULFATE TAB 325 MG (65 MG ELEMENTAL FE) 325 MG: 325 (65 FE) TAB at 15:39

## 2017-05-16 RX ADMIN — PREDNISONE 60 MG: 20 TABLET ORAL at 08:52

## 2017-05-16 RX ADMIN — Medication 1 TABLET: at 07:54

## 2017-05-16 RX ADMIN — IPRATROPIUM BROMIDE AND ALBUTEROL SULFATE 3 ML: .5; 3 SOLUTION RESPIRATORY (INHALATION) at 14:02

## 2017-05-16 RX ADMIN — HEPARIN SODIUM 5000 UNITS: 5000 INJECTION, SOLUTION INTRAVENOUS; SUBCUTANEOUS at 05:39

## 2017-05-16 RX ADMIN — CHOLECALCIFEROL TAB 10 MCG (400 UNIT) 400 UNITS: 10 TAB at 08:52

## 2017-05-16 RX ADMIN — IPRATROPIUM BROMIDE AND ALBUTEROL SULFATE 3 ML: .5; 3 SOLUTION RESPIRATORY (INHALATION) at 20:22

## 2017-05-16 RX ADMIN — ALUMINUM HYDROXIDE, MAGNESIUM HYDROXIDE, AND SIMETHICONE 30 ML: 200; 200; 20 SUSPENSION ORAL at 02:25

## 2017-05-16 RX ADMIN — ALUMINUM HYDROXIDE, MAGNESIUM HYDROXIDE, AND SIMETHICONE 30 ML: 200; 200; 20 SUSPENSION ORAL at 10:55

## 2017-05-16 RX ADMIN — HEPARIN SODIUM 5000 UNITS: 5000 INJECTION, SOLUTION INTRAVENOUS; SUBCUTANEOUS at 14:18

## 2017-05-16 RX ADMIN — HEPARIN SODIUM 5000 UNITS: 5000 INJECTION, SOLUTION INTRAVENOUS; SUBCUTANEOUS at 21:53

## 2017-05-17 VITALS
SYSTOLIC BLOOD PRESSURE: 120 MMHG | BODY MASS INDEX: 21.26 KG/M2 | HEIGHT: 66 IN | RESPIRATION RATE: 20 BRPM | HEART RATE: 73 BPM | TEMPERATURE: 97.3 F | OXYGEN SATURATION: 93 % | WEIGHT: 132.28 LBS | DIASTOLIC BLOOD PRESSURE: 61 MMHG

## 2017-05-17 PROBLEM — J90 PLEURAL EFFUSION: Status: ACTIVE | Noted: 2017-05-17

## 2017-05-17 PROBLEM — D61.818 PANCYTOPENIA (HCC): Status: ACTIVE | Noted: 2017-05-17

## 2017-05-17 PROBLEM — J18.9 PNEUMONIA: Status: ACTIVE | Noted: 2017-05-17

## 2017-05-17 PROBLEM — D64.9 ANEMIA: Status: ACTIVE | Noted: 2017-05-17

## 2017-05-17 LAB
ANION GAP SERPL CALCULATED.3IONS-SCNC: 3 MMOL/L (ref 4–13)
BACTERIA BLD CULT: NORMAL
BACTERIA BLD CULT: NORMAL
BUN SERPL-MCNC: 23 MG/DL (ref 5–25)
CALCIUM SERPL-MCNC: 8.9 MG/DL (ref 8.3–10.1)
CHLORIDE SERPL-SCNC: 106 MMOL/L (ref 100–108)
CO2 SERPL-SCNC: 28 MMOL/L (ref 21–32)
CREAT SERPL-MCNC: 1.01 MG/DL (ref 0.6–1.3)
ERYTHROCYTE [DISTWIDTH] IN BLOOD BY AUTOMATED COUNT: 18.6 % (ref 11.6–15.1)
GFR SERPL CREATININE-BSD FRML MDRD: 55 ML/MIN/1.73SQ M
GLUCOSE SERPL-MCNC: 127 MG/DL (ref 65–140)
HCT VFR BLD AUTO: 29.5 % (ref 34.8–46.1)
HGB BLD-MCNC: 9.1 G/DL (ref 11.5–15.4)
MCH RBC QN AUTO: 28.8 PG (ref 26.8–34.3)
MCHC RBC AUTO-ENTMCNC: 30.8 G/DL (ref 31.4–37.4)
MCV RBC AUTO: 93 FL (ref 82–98)
PLATELET # BLD AUTO: 84 THOUSANDS/UL (ref 149–390)
PMV BLD AUTO: 10.4 FL (ref 8.9–12.7)
POTASSIUM SERPL-SCNC: 3.9 MMOL/L (ref 3.5–5.3)
RBC # BLD AUTO: 3.16 MILLION/UL (ref 3.81–5.12)
SODIUM SERPL-SCNC: 137 MMOL/L (ref 136–145)
WBC # BLD AUTO: 6.61 THOUSAND/UL (ref 4.31–10.16)

## 2017-05-17 PROCEDURE — 85027 COMPLETE CBC AUTOMATED: CPT | Performed by: INTERNAL MEDICINE

## 2017-05-17 PROCEDURE — 97110 THERAPEUTIC EXERCISES: CPT

## 2017-05-17 PROCEDURE — 94760 N-INVAS EAR/PLS OXIMETRY 1: CPT

## 2017-05-17 PROCEDURE — 97116 GAIT TRAINING THERAPY: CPT

## 2017-05-17 PROCEDURE — 80048 BASIC METABOLIC PNL TOTAL CA: CPT | Performed by: INTERNAL MEDICINE

## 2017-05-17 PROCEDURE — 94640 AIRWAY INHALATION TREATMENT: CPT

## 2017-05-17 RX ORDER — SULFAMETHOXAZOLE AND TRIMETHOPRIM 800; 160 MG/1; MG/1
1 TABLET ORAL 3 TIMES WEEKLY
Qty: 3 TABLET | Refills: 0 | Status: SHIPPED | OUTPATIENT
Start: 2017-05-17 | End: 2017-05-27

## 2017-05-17 RX ORDER — ALBUTEROL SULFATE 90 UG/1
2 AEROSOL, METERED RESPIRATORY (INHALATION) EVERY 4 HOURS PRN
Status: DISCONTINUED | OUTPATIENT
Start: 2017-05-17 | End: 2017-05-17 | Stop reason: HOSPADM

## 2017-05-17 RX ORDER — GUAIFENESIN 600 MG
600 TABLET, EXTENDED RELEASE 12 HR ORAL 2 TIMES DAILY
Qty: 14 TABLET | Refills: 0 | Status: SHIPPED | OUTPATIENT
Start: 2017-05-17 | End: 2017-05-24

## 2017-05-17 RX ORDER — PREDNISONE 10 MG/1
TABLET ORAL
Qty: 250 TABLET | Refills: 1 | Status: SHIPPED | OUTPATIENT
Start: 2017-05-17 | End: 2017-05-17

## 2017-05-17 RX ORDER — ALBUTEROL SULFATE 2.5 MG/3ML
2.5 SOLUTION RESPIRATORY (INHALATION) EVERY 6 HOURS PRN
Qty: 75 ML | Refills: 0 | Status: SHIPPED | OUTPATIENT
Start: 2017-05-17 | End: 2017-05-17

## 2017-05-17 RX ORDER — PREDNISONE 10 MG/1
TABLET ORAL
Qty: 250 TABLET | Refills: 0 | Status: SHIPPED | OUTPATIENT
Start: 2017-05-17 | End: 2017-06-23 | Stop reason: HOSPADM

## 2017-05-17 RX ORDER — SULFAMETHOXAZOLE AND TRIMETHOPRIM 800; 160 MG/1; MG/1
1 TABLET ORAL 3 TIMES WEEKLY
Qty: 120 TABLET | Refills: 0 | Status: SHIPPED | OUTPATIENT
Start: 2017-05-17 | End: 2017-05-17

## 2017-05-17 RX ORDER — CEFDINIR 300 MG/1
300 CAPSULE ORAL EVERY 12 HOURS
Qty: 5 CAPSULE | Refills: 0 | Status: SHIPPED | OUTPATIENT
Start: 2017-05-17 | End: 2017-05-20

## 2017-05-17 RX ORDER — CEFDINIR 300 MG/1
300 CAPSULE ORAL EVERY 12 HOURS
Qty: 5 CAPSULE | Refills: 0 | Status: SHIPPED | OUTPATIENT
Start: 2017-05-17 | End: 2017-05-17

## 2017-05-17 RX ORDER — ALBUTEROL SULFATE 2.5 MG/3ML
2.5 SOLUTION RESPIRATORY (INHALATION) EVERY 6 HOURS PRN
Qty: 75 ML | Refills: 0 | Status: SHIPPED | OUTPATIENT
Start: 2017-05-17 | End: 2017-06-23 | Stop reason: HOSPADM

## 2017-05-17 RX ADMIN — HEPARIN SODIUM 5000 UNITS: 5000 INJECTION, SOLUTION INTRAVENOUS; SUBCUTANEOUS at 05:54

## 2017-05-17 RX ADMIN — GUAIFENESIN 600 MG: 600 TABLET, EXTENDED RELEASE ORAL at 08:47

## 2017-05-17 RX ADMIN — CHOLECALCIFEROL TAB 10 MCG (400 UNIT) 400 UNITS: 10 TAB at 08:47

## 2017-05-17 RX ADMIN — PREDNISONE 60 MG: 20 TABLET ORAL at 08:47

## 2017-05-17 RX ADMIN — HEPARIN SODIUM 5000 UNITS: 5000 INJECTION, SOLUTION INTRAVENOUS; SUBCUTANEOUS at 13:59

## 2017-05-17 RX ADMIN — IPRATROPIUM BROMIDE AND ALBUTEROL SULFATE 3 ML: .5; 3 SOLUTION RESPIRATORY (INHALATION) at 08:46

## 2017-05-17 RX ADMIN — IPRATROPIUM BROMIDE AND ALBUTEROL SULFATE 3 ML: .5; 3 SOLUTION RESPIRATORY (INHALATION) at 13:51

## 2017-05-17 RX ADMIN — CEFEPIME HYDROCHLORIDE 2000 MG: 2 INJECTION, POWDER, FOR SOLUTION INTRAVENOUS at 11:10

## 2017-05-17 RX ADMIN — IPRATROPIUM BROMIDE AND ALBUTEROL SULFATE 3 ML: .5; 3 SOLUTION RESPIRATORY (INHALATION) at 00:15

## 2017-05-17 RX ADMIN — Medication 1 TABLET: at 08:47

## 2017-05-17 RX ADMIN — FERROUS SULFATE TAB 325 MG (65 MG ELEMENTAL FE) 325 MG: 325 (65 FE) TAB at 07:04

## 2017-05-17 RX ADMIN — Medication 1 TABLET: at 07:04

## 2017-05-17 RX ADMIN — BUDESONIDE AND FORMOTEROL FUMARATE DIHYDRATE 2 PUFF: 160; 4.5 AEROSOL RESPIRATORY (INHALATION) at 08:50

## 2017-05-24 ENCOUNTER — ALLSCRIPTS OFFICE VISIT (OUTPATIENT)
Dept: OTHER | Facility: OTHER | Age: 67
End: 2017-05-24

## 2017-05-31 ENCOUNTER — ALLSCRIPTS OFFICE VISIT (OUTPATIENT)
Dept: OTHER | Facility: OTHER | Age: 67
End: 2017-05-31

## 2017-05-31 ENCOUNTER — GENERIC CONVERSION - ENCOUNTER (OUTPATIENT)
Dept: OTHER | Facility: OTHER | Age: 67
End: 2017-05-31

## 2017-05-31 ENCOUNTER — TRANSCRIBE ORDERS (OUTPATIENT)
Dept: ADMINISTRATIVE | Facility: HOSPITAL | Age: 67
End: 2017-05-31

## 2017-05-31 DIAGNOSIS — C34.90 MALIGNANT NEOPLASM OF LUNG, UNSPECIFIED LATERALITY, UNSPECIFIED PART OF LUNG (HCC): Primary | ICD-10-CM

## 2017-06-06 ENCOUNTER — APPOINTMENT (OUTPATIENT)
Dept: LAB | Facility: CLINIC | Age: 67
End: 2017-06-06
Payer: COMMERCIAL

## 2017-06-06 DIAGNOSIS — C34.90 MALIGNANT NEOPLASM OF UNSPECIFIED PART OF UNSPECIFIED BRONCHUS OR LUNG (HCC): ICD-10-CM

## 2017-06-06 LAB
ALBUMIN SERPL BCP-MCNC: 2.8 G/DL (ref 3.5–5)
ALP SERPL-CCNC: 67 U/L (ref 46–116)
ALT SERPL W P-5'-P-CCNC: <6 U/L (ref 12–78)
ANION GAP SERPL CALCULATED.3IONS-SCNC: 4 MMOL/L (ref 4–13)
ANISOCYTOSIS BLD QL SMEAR: PRESENT
AST SERPL W P-5'-P-CCNC: <5 U/L (ref 5–45)
BASOPHILS # BLD AUTO: 0 THOUSAND/UL (ref 0–0.1)
BASOPHILS NFR MAR MANUAL: 0 % (ref 0–1)
BILIRUB SERPL-MCNC: 0.3 MG/DL (ref 0.2–1)
BUN SERPL-MCNC: 34 MG/DL (ref 5–25)
CALCIUM SERPL-MCNC: 8.9 MG/DL (ref 8.3–10.1)
CHLORIDE SERPL-SCNC: 105 MMOL/L (ref 100–108)
CO2 SERPL-SCNC: 30 MMOL/L (ref 21–32)
CREAT SERPL-MCNC: 1.17 MG/DL (ref 0.6–1.3)
EOSINOPHIL # BLD AUTO: 0 THOUSAND/UL (ref 0–0.61)
EOSINOPHIL NFR BLD MANUAL: 0 % (ref 0–6)
ERYTHROCYTE [DISTWIDTH] IN BLOOD BY AUTOMATED COUNT: 22 % (ref 11.6–15.1)
GFR SERPL CREATININE-BSD FRML MDRD: 46.4 ML/MIN/1.73SQ M
GLUCOSE SERPL-MCNC: 194 MG/DL (ref 65–140)
HCT VFR BLD AUTO: 25 % (ref 34.8–46.1)
HGB BLD-MCNC: 8 G/DL (ref 11.5–15.4)
HYPERCHROMIA BLD QL SMEAR: PRESENT
LYMPHOCYTES # BLD AUTO: 0.12 THOUSAND/UL (ref 0.6–4.47)
LYMPHOCYTES # BLD AUTO: 2 % (ref 14–44)
MCH RBC QN AUTO: 29 PG (ref 26.8–34.3)
MCHC RBC AUTO-ENTMCNC: 32.2 G/DL (ref 31.4–37.4)
MCV RBC AUTO: 90 FL (ref 82–98)
MONOCYTES # BLD AUTO: 0.06 THOUSAND/UL (ref 0–1.22)
MONOCYTES NFR BLD AUTO: 1 % (ref 4–12)
NEUTS BAND NFR BLD MANUAL: 10 % (ref 0–8)
NEUTS SEG # BLD: 5.63 THOUSAND/UL (ref 1.81–6.82)
NEUTS SEG NFR BLD AUTO: 87 % (ref 43–75)
PLATELET # BLD AUTO: 114 THOUSANDS/UL (ref 149–390)
PLATELET BLD QL SMEAR: ABNORMAL
PMV BLD AUTO: 7 FL (ref 8.9–12.7)
POTASSIUM SERPL-SCNC: 5.1 MMOL/L (ref 3.5–5.3)
PROT SERPL-MCNC: 6.9 G/DL (ref 6.4–8.2)
RBC # BLD AUTO: 2.77 MILLION/UL (ref 3.81–5.12)
SODIUM SERPL-SCNC: 139 MMOL/L (ref 136–145)
TOTAL CELLS COUNTED SPEC: 100
WBC # BLD AUTO: 5.8 THOUSAND/UL (ref 4.31–10.16)
WBC NRBC COR # BLD: 5.8 THOUSAND/UL (ref 4.31–10.16)

## 2017-06-06 PROCEDURE — 85027 COMPLETE CBC AUTOMATED: CPT

## 2017-06-06 PROCEDURE — 80053 COMPREHEN METABOLIC PANEL: CPT

## 2017-06-06 PROCEDURE — 36415 COLL VENOUS BLD VENIPUNCTURE: CPT

## 2017-06-06 PROCEDURE — 85007 BL SMEAR W/DIFF WBC COUNT: CPT

## 2017-06-06 RX ORDER — SODIUM CHLORIDE 9 MG/ML
20 INJECTION, SOLUTION INTRAVENOUS CONTINUOUS
Status: DISCONTINUED | OUTPATIENT
Start: 2017-06-07 | End: 2017-06-10 | Stop reason: HOSPADM

## 2017-06-07 ENCOUNTER — HOSPITAL ENCOUNTER (OUTPATIENT)
Dept: INFUSION CENTER | Facility: CLINIC | Age: 67
Discharge: HOME | End: 2017-06-07
Payer: COMMERCIAL

## 2017-06-07 VITALS
BODY MASS INDEX: 20.2 KG/M2 | DIASTOLIC BLOOD PRESSURE: 78 MMHG | TEMPERATURE: 97.7 F | WEIGHT: 125.66 LBS | SYSTOLIC BLOOD PRESSURE: 115 MMHG | HEART RATE: 82 BPM | RESPIRATION RATE: 18 BRPM | OXYGEN SATURATION: 97 % | HEIGHT: 66 IN

## 2017-06-07 PROCEDURE — 96367 TX/PROPH/DG ADDL SEQ IV INF: CPT

## 2017-06-07 PROCEDURE — 96375 TX/PRO/DX INJ NEW DRUG ADDON: CPT

## 2017-06-07 PROCEDURE — 96413 CHEMO IV INFUSION 1 HR: CPT

## 2017-06-07 PROCEDURE — 96417 CHEMO IV INFUS EACH ADDL SEQ: CPT

## 2017-06-07 RX ORDER — SULFAMETHOXAZOLE AND TRIMETHOPRIM 400; 80 MG/1; MG/1
1 TABLET ORAL DAILY
COMMUNITY

## 2017-06-07 RX ADMIN — CARBOPLATIN 285 MG: 10 INJECTION, SOLUTION INTRAVENOUS at 11:05

## 2017-06-07 RX ADMIN — HYDROCORTISONE SODIUM SUCCINATE 100 MG: 100 INJECTION, POWDER, FOR SOLUTION INTRAMUSCULAR; INTRAVENOUS at 09:46

## 2017-06-07 RX ADMIN — DEXAMETHASONE SODIUM PHOSPHATE: 10 INJECTION, SOLUTION INTRAMUSCULAR; INTRAVENOUS at 09:01

## 2017-06-07 RX ADMIN — DIPHENHYDRAMINE HYDROCHLORIDE 25 MG: 50 INJECTION, SOLUTION INTRAMUSCULAR; INTRAVENOUS at 09:27

## 2017-06-07 RX ADMIN — SODIUM CHLORIDE 20 ML/HR: 0.9 INJECTION, SOLUTION INTRAVENOUS at 09:02

## 2017-06-07 RX ADMIN — DOCETAXEL 101 MG: 20 INJECTION, SOLUTION, CONCENTRATE INTRAVENOUS at 09:57

## 2017-06-07 NOTE — PROGRESS NOTES
Pt arrived to unit without complaint  It is noted pt's Hgb=8 0, this is within approved parameters for chemo admin today  Pt is assymptomatic  Spoke to Blaise, RN with Dr Chan Gudino to inform her of low Hgb  Pt will have weekly CBC checks to monitor  Pt informed of same and verbalized understanding  Pt also instructed to notify MD with any s/s worsening anemia such as SOB, lightheadedness, dizziness

## 2017-06-07 NOTE — PLAN OF CARE
Problem: Potential for Falls  Goal: Patient will remain free of falls  INTERVENTIONS:  - Assess patient frequently for physical needs  - Identify cognitive and physical deficits and behaviors that affect risk of falls    - [place] fall precautions as indicated by assessment   - Educate patient/family on patient safety including physical limitations  - Instruct patient to call for assistance with activity based on assessment  - Modify environment to reduce risk of injury  - Consider OT/PT consult to assist with strengthening/mobility   Outcome: Progressing

## 2017-06-10 ENCOUNTER — APPOINTMENT (EMERGENCY)
Dept: RADIOLOGY | Facility: HOSPITAL | Age: 67
DRG: 189 | End: 2017-06-10
Payer: COMMERCIAL

## 2017-06-10 ENCOUNTER — HOSPITAL ENCOUNTER (INPATIENT)
Facility: HOSPITAL | Age: 67
LOS: 13 days | Discharge: NURSING, REHAB OR LONG-TERM CARE | DRG: 189 | End: 2017-06-23
Attending: EMERGENCY MEDICINE | Admitting: INTERNAL MEDICINE
Payer: COMMERCIAL

## 2017-06-10 ENCOUNTER — APPOINTMENT (EMERGENCY)
Dept: CT IMAGING | Facility: HOSPITAL | Age: 67
DRG: 189 | End: 2017-06-10
Payer: COMMERCIAL

## 2017-06-10 DIAGNOSIS — C34.91 SQUAMOUS CELL LUNG CANCER, RIGHT (HCC): ICD-10-CM

## 2017-06-10 DIAGNOSIS — J44.1 ACUTE EXACERBATION OF CHRONIC OBSTRUCTIVE PULMONARY DISEASE (COPD) (HCC): ICD-10-CM

## 2017-06-10 DIAGNOSIS — R00.0 TACHYCARDIA: ICD-10-CM

## 2017-06-10 DIAGNOSIS — D61.818 PANCYTOPENIA (HCC): ICD-10-CM

## 2017-06-10 DIAGNOSIS — R06.00 DYSPNEA: Primary | ICD-10-CM

## 2017-06-10 DIAGNOSIS — D69.6 THROMBOCYTOPENIA (HCC): ICD-10-CM

## 2017-06-10 LAB
ALBUMIN SERPL BCP-MCNC: 2.3 G/DL (ref 3.5–5)
ALP SERPL-CCNC: 82 U/L (ref 46–116)
ALT SERPL W P-5'-P-CCNC: 55 U/L (ref 12–78)
ANION GAP SERPL CALCULATED.3IONS-SCNC: 7 MMOL/L (ref 4–13)
ANISOCYTOSIS BLD QL SMEAR: PRESENT
AST SERPL W P-5'-P-CCNC: 31 U/L (ref 5–45)
ATRIAL RATE: 108 BPM
ATRIAL RATE: 150 BPM
BASOPHILS # BLD MANUAL: 0 THOUSAND/UL (ref 0–0.1)
BASOPHILS NFR MAR MANUAL: 0 % (ref 0–1)
BILIRUB SERPL-MCNC: 0.49 MG/DL (ref 0.2–1)
BUN SERPL-MCNC: 41 MG/DL (ref 5–25)
CALCIUM SERPL-MCNC: 9.1 MG/DL (ref 8.3–10.1)
CHLORIDE SERPL-SCNC: 104 MMOL/L (ref 100–108)
CO2 SERPL-SCNC: 29 MMOL/L (ref 21–32)
CREAT SERPL-MCNC: 1.24 MG/DL (ref 0.6–1.3)
EOSINOPHIL # BLD MANUAL: 0 THOUSAND/UL (ref 0–0.4)
EOSINOPHIL NFR BLD MANUAL: 0 % (ref 0–6)
ERYTHROCYTE [DISTWIDTH] IN BLOOD BY AUTOMATED COUNT: 19.1 % (ref 11.6–15.1)
GFR SERPL CREATININE-BSD FRML MDRD: 43.4 ML/MIN/1.73SQ M
GLUCOSE SERPL-MCNC: 81 MG/DL (ref 65–140)
HCT VFR BLD AUTO: 31.8 % (ref 34.8–46.1)
HGB BLD-MCNC: 10.1 G/DL (ref 11.5–15.4)
LYMPHOCYTES # BLD AUTO: 0.39 THOUSAND/UL (ref 0.6–4.47)
LYMPHOCYTES # BLD AUTO: 13 % (ref 14–44)
MCH RBC QN AUTO: 29.8 PG (ref 26.8–34.3)
MCHC RBC AUTO-ENTMCNC: 31.8 G/DL (ref 31.4–37.4)
MCV RBC AUTO: 94 FL (ref 82–98)
MONOCYTES # BLD AUTO: 0 THOUSAND/UL (ref 0–1.22)
MONOCYTES NFR BLD: 0 % (ref 4–12)
NEUTROPHILS # BLD MANUAL: 2.61 THOUSAND/UL (ref 1.85–7.62)
NEUTS BAND NFR BLD MANUAL: 6 % (ref 0–8)
NEUTS SEG NFR BLD AUTO: 81 % (ref 43–75)
NRBC BLD AUTO-RTO: 0 /100 WBCS
NT-PROBNP SERPL-MCNC: 2676 PG/ML
P AXIS: 66 DEGREES
P AXIS: 72 DEGREES
PLATELET # BLD AUTO: 78 THOUSANDS/UL (ref 149–390)
PLATELET BLD QL SMEAR: ABNORMAL
PMV BLD AUTO: 10.3 FL (ref 8.9–12.7)
POTASSIUM SERPL-SCNC: 5.1 MMOL/L (ref 3.5–5.3)
PR INTERVAL: 112 MS
PR INTERVAL: 120 MS
PROT SERPL-MCNC: 7.4 G/DL (ref 6.4–8.2)
QRS AXIS: 67 DEGREES
QRS AXIS: 68 DEGREES
QRSD INTERVAL: 68 MS
QRSD INTERVAL: 72 MS
QT INTERVAL: 262 MS
QT INTERVAL: 302 MS
QTC INTERVAL: 404 MS
QTC INTERVAL: 413 MS
RBC # BLD AUTO: 3.39 MILLION/UL (ref 3.81–5.12)
SODIUM SERPL-SCNC: 140 MMOL/L (ref 136–145)
SPECIMEN SOURCE: NORMAL
T WAVE AXIS: 235 DEGREES
T WAVE AXIS: 68 DEGREES
TOTAL CELLS COUNTED SPEC: 100
TROPONIN I BLD-MCNC: 0.01 NG/ML (ref 0–0.08)
VENTRICULAR RATE: 108 BPM
VENTRICULAR RATE: 150 BPM
WBC # BLD AUTO: 3 THOUSAND/UL (ref 4.31–10.16)

## 2017-06-10 PROCEDURE — 93005 ELECTROCARDIOGRAM TRACING: CPT

## 2017-06-10 PROCEDURE — 99285 EMERGENCY DEPT VISIT HI MDM: CPT

## 2017-06-10 PROCEDURE — 87040 BLOOD CULTURE FOR BACTERIA: CPT | Performed by: INTERNAL MEDICINE

## 2017-06-10 PROCEDURE — 80053 COMPREHEN METABOLIC PANEL: CPT | Performed by: EMERGENCY MEDICINE

## 2017-06-10 PROCEDURE — 93005 ELECTROCARDIOGRAM TRACING: CPT | Performed by: EMERGENCY MEDICINE

## 2017-06-10 PROCEDURE — 96360 HYDRATION IV INFUSION INIT: CPT

## 2017-06-10 PROCEDURE — 94760 N-INVAS EAR/PLS OXIMETRY 1: CPT

## 2017-06-10 PROCEDURE — 85007 BL SMEAR W/DIFF WBC COUNT: CPT | Performed by: EMERGENCY MEDICINE

## 2017-06-10 PROCEDURE — 84484 ASSAY OF TROPONIN QUANT: CPT

## 2017-06-10 PROCEDURE — 94640 AIRWAY INHALATION TREATMENT: CPT

## 2017-06-10 PROCEDURE — 71275 CT ANGIOGRAPHY CHEST: CPT

## 2017-06-10 PROCEDURE — 85027 COMPLETE CBC AUTOMATED: CPT | Performed by: EMERGENCY MEDICINE

## 2017-06-10 PROCEDURE — 83880 ASSAY OF NATRIURETIC PEPTIDE: CPT | Performed by: EMERGENCY MEDICINE

## 2017-06-10 PROCEDURE — 71020 HB CHEST X-RAY 2VW FRONTAL&LATL: CPT

## 2017-06-10 PROCEDURE — 36415 COLL VENOUS BLD VENIPUNCTURE: CPT

## 2017-06-10 RX ORDER — ALBUTEROL SULFATE 2.5 MG/3ML
5 SOLUTION RESPIRATORY (INHALATION) ONCE
Status: COMPLETED | OUTPATIENT
Start: 2017-06-10 | End: 2017-06-10

## 2017-06-10 RX ORDER — HEPARIN SODIUM 5000 [USP'U]/ML
5000 INJECTION, SOLUTION INTRAVENOUS; SUBCUTANEOUS EVERY 8 HOURS SCHEDULED
Status: DISCONTINUED | OUTPATIENT
Start: 2017-06-10 | End: 2017-06-11

## 2017-06-10 RX ORDER — BUDESONIDE AND FORMOTEROL FUMARATE DIHYDRATE 160; 4.5 UG/1; UG/1
2 AEROSOL RESPIRATORY (INHALATION) 2 TIMES DAILY
Status: DISCONTINUED | OUTPATIENT
Start: 2017-06-10 | End: 2017-06-23 | Stop reason: HOSPADM

## 2017-06-10 RX ORDER — METHYLPREDNISOLONE SODIUM SUCCINATE 40 MG/ML
40 INJECTION, POWDER, LYOPHILIZED, FOR SOLUTION INTRAMUSCULAR; INTRAVENOUS EVERY 8 HOURS SCHEDULED
Status: DISCONTINUED | OUTPATIENT
Start: 2017-06-10 | End: 2017-06-11

## 2017-06-10 RX ORDER — ACETAMINOPHEN 325 MG/1
650 TABLET ORAL EVERY 6 HOURS PRN
Status: DISCONTINUED | OUTPATIENT
Start: 2017-06-10 | End: 2017-06-23 | Stop reason: HOSPADM

## 2017-06-10 RX ORDER — MAGNESIUM HYDROXIDE/ALUMINUM HYDROXICE/SIMETHICONE 120; 1200; 1200 MG/30ML; MG/30ML; MG/30ML
30 SUSPENSION ORAL EVERY 4 HOURS PRN
Status: DISCONTINUED | OUTPATIENT
Start: 2017-06-10 | End: 2017-06-15

## 2017-06-10 RX ORDER — SULFAMETHOXAZOLE AND TRIMETHOPRIM 400; 80 MG/1; MG/1
1 TABLET ORAL DAILY
Status: DISCONTINUED | OUTPATIENT
Start: 2017-06-10 | End: 2017-06-23 | Stop reason: HOSPADM

## 2017-06-10 RX ORDER — FENTANYL CITRATE 50 UG/ML
50 INJECTION, SOLUTION INTRAMUSCULAR; INTRAVENOUS ONCE
Status: COMPLETED | OUTPATIENT
Start: 2017-06-10 | End: 2017-06-10

## 2017-06-10 RX ORDER — FERROUS SULFATE 325(65) MG
325 TABLET ORAL
Status: DISCONTINUED | OUTPATIENT
Start: 2017-06-11 | End: 2017-06-23 | Stop reason: HOSPADM

## 2017-06-10 RX ORDER — ALBUTEROL SULFATE 2.5 MG/3ML
2.5 SOLUTION RESPIRATORY (INHALATION) EVERY 6 HOURS PRN
Status: DISCONTINUED | OUTPATIENT
Start: 2017-06-10 | End: 2017-06-11

## 2017-06-10 RX ORDER — SODIUM CHLORIDE 9 MG/ML
100 INJECTION, SOLUTION INTRAVENOUS CONTINUOUS
Status: DISCONTINUED | OUTPATIENT
Start: 2017-06-10 | End: 2017-06-11

## 2017-06-10 RX ORDER — FUROSEMIDE 10 MG/ML
20 INJECTION INTRAMUSCULAR; INTRAVENOUS ONCE
Status: COMPLETED | OUTPATIENT
Start: 2017-06-10 | End: 2017-06-10

## 2017-06-10 RX ORDER — KETOROLAC TROMETHAMINE 30 MG/ML
15 INJECTION, SOLUTION INTRAMUSCULAR; INTRAVENOUS ONCE
Status: COMPLETED | OUTPATIENT
Start: 2017-06-10 | End: 2017-06-10

## 2017-06-10 RX ADMIN — ACETAMINOPHEN 650 MG: 325 TABLET, FILM COATED ORAL at 14:58

## 2017-06-10 RX ADMIN — ALUMINUM HYDROXIDE, MAGNESIUM HYDROXIDE, AND SIMETHICONE 30 ML: 200; 200; 20 SUSPENSION ORAL at 17:53

## 2017-06-10 RX ADMIN — ALBUTEROL SULFATE 2.5 MG: 2.5 SOLUTION RESPIRATORY (INHALATION) at 17:58

## 2017-06-10 RX ADMIN — DILTIAZEM HYDROCHLORIDE 30 MG: 30 TABLET, FILM COATED ORAL at 23:26

## 2017-06-10 RX ADMIN — IPRATROPIUM BROMIDE 0.5 MG: 0.5 SOLUTION RESPIRATORY (INHALATION) at 09:54

## 2017-06-10 RX ADMIN — DILTIAZEM HYDROCHLORIDE 30 MG: 30 TABLET, FILM COATED ORAL at 17:42

## 2017-06-10 RX ADMIN — PREDNISONE 50 MG: 20 TABLET ORAL at 14:59

## 2017-06-10 RX ADMIN — METHYLPREDNISOLONE SODIUM SUCCINATE 40 MG: 40 INJECTION, POWDER, FOR SOLUTION INTRAMUSCULAR; INTRAVENOUS at 21:58

## 2017-06-10 RX ADMIN — BUDESONIDE AND FORMOTEROL FUMARATE DIHYDRATE 2 PUFF: 160; 4.5 AEROSOL RESPIRATORY (INHALATION) at 17:42

## 2017-06-10 RX ADMIN — KETOROLAC TROMETHAMINE 15 MG: 30 INJECTION, SOLUTION INTRAMUSCULAR at 23:52

## 2017-06-10 RX ADMIN — SODIUM CHLORIDE 1000 ML: 0.9 INJECTION, SOLUTION INTRAVENOUS at 09:57

## 2017-06-10 RX ADMIN — ACETAMINOPHEN 650 MG: 325 TABLET, FILM COATED ORAL at 22:29

## 2017-06-10 RX ADMIN — ALBUTEROL SULFATE 5 MG: 2.5 SOLUTION RESPIRATORY (INHALATION) at 09:54

## 2017-06-10 RX ADMIN — FUROSEMIDE 20 MG: 10 INJECTION, SOLUTION INTRAMUSCULAR; INTRAVENOUS at 12:33

## 2017-06-10 RX ADMIN — FENTANYL CITRATE 50 MCG: 50 INJECTION, SOLUTION INTRAMUSCULAR; INTRAVENOUS at 13:48

## 2017-06-10 RX ADMIN — HEPARIN SODIUM 5000 UNITS: 5000 INJECTION, SOLUTION INTRAVENOUS; SUBCUTANEOUS at 21:58

## 2017-06-10 RX ADMIN — ALUMINUM HYDROXIDE, MAGNESIUM HYDROXIDE, AND SIMETHICONE 30 ML: 200; 200; 20 SUSPENSION ORAL at 22:03

## 2017-06-10 RX ADMIN — SODIUM CHLORIDE 100 ML/HR: 0.9 INJECTION, SOLUTION INTRAVENOUS at 17:42

## 2017-06-10 RX ADMIN — IODIXANOL 85 ML: 320 INJECTION, SOLUTION INTRAVASCULAR at 11:02

## 2017-06-10 RX ADMIN — HEPARIN SODIUM 5000 UNITS: 5000 INJECTION, SOLUTION INTRAVENOUS; SUBCUTANEOUS at 14:59

## 2017-06-10 RX ADMIN — METOPROLOL TARTRATE 2.5 MG: 5 INJECTION INTRAVENOUS at 12:28

## 2017-06-10 RX ADMIN — IPRATROPIUM BROMIDE 0.5 MG: 0.5 SOLUTION RESPIRATORY (INHALATION) at 17:58

## 2017-06-10 RX ADMIN — Medication 800 UNITS: at 17:43

## 2017-06-10 RX ADMIN — SULFAMETHOXAZOLE AND TRIMETHOPRIM 1 TABLET: 400; 80 TABLET ORAL at 17:42

## 2017-06-11 LAB
ABO GROUP BLD: NORMAL
ALBUMIN SERPL BCP-MCNC: 1.7 G/DL (ref 3.5–5)
ALP SERPL-CCNC: 65 U/L (ref 46–116)
ALT SERPL W P-5'-P-CCNC: 32 U/L (ref 12–78)
ANION GAP SERPL CALCULATED.3IONS-SCNC: 8 MMOL/L (ref 4–13)
AST SERPL W P-5'-P-CCNC: 18 U/L (ref 5–45)
BILIRUB SERPL-MCNC: 0.25 MG/DL (ref 0.2–1)
BLD GP AB SCN SERPL QL: NEGATIVE
BUN SERPL-MCNC: 43 MG/DL (ref 5–25)
CALCIUM SERPL-MCNC: 8.3 MG/DL (ref 8.3–10.1)
CHLORIDE SERPL-SCNC: 108 MMOL/L (ref 100–108)
CO2 SERPL-SCNC: 27 MMOL/L (ref 21–32)
CREAT SERPL-MCNC: 1.27 MG/DL (ref 0.6–1.3)
ERYTHROCYTE [DISTWIDTH] IN BLOOD BY AUTOMATED COUNT: 19.5 % (ref 11.6–15.1)
GFR SERPL CREATININE-BSD FRML MDRD: 42.2 ML/MIN/1.73SQ M
GLUCOSE SERPL-MCNC: 145 MG/DL (ref 65–140)
HCT VFR BLD AUTO: 23.1 % (ref 34.8–46.1)
HGB BLD-MCNC: 7.3 G/DL (ref 11.5–15.4)
INR PPP: 1.28 (ref 0.86–1.16)
MCH RBC QN AUTO: 29.3 PG (ref 26.8–34.3)
MCHC RBC AUTO-ENTMCNC: 31.6 G/DL (ref 31.4–37.4)
MCV RBC AUTO: 93 FL (ref 82–98)
PLATELET # BLD AUTO: 50 THOUSANDS/UL (ref 149–390)
PLATELET # BLD AUTO: 50 THOUSANDS/UL (ref 149–390)
PMV BLD AUTO: 10.1 FL (ref 8.9–12.7)
PMV BLD AUTO: 10.4 FL (ref 8.9–12.7)
POTASSIUM SERPL-SCNC: 5 MMOL/L (ref 3.5–5.3)
PROT SERPL-MCNC: 5.6 G/DL (ref 6.4–8.2)
PROTHROMBIN TIME: 16.1 SECONDS (ref 12.1–14.4)
RBC # BLD AUTO: 2.49 MILLION/UL (ref 3.81–5.12)
RH BLD: POSITIVE
SODIUM SERPL-SCNC: 143 MMOL/L (ref 136–145)
SPECIMEN EXPIRATION DATE: NORMAL
WBC # BLD AUTO: 0.93 THOUSAND/UL (ref 4.31–10.16)

## 2017-06-11 PROCEDURE — 86923 COMPATIBILITY TEST ELECTRIC: CPT

## 2017-06-11 PROCEDURE — 30233N1 TRANSFUSION OF NONAUTOLOGOUS RED BLOOD CELLS INTO PERIPHERAL VEIN, PERCUTANEOUS APPROACH: ICD-10-PCS | Performed by: INTERNAL MEDICINE

## 2017-06-11 PROCEDURE — 85027 COMPLETE CBC AUTOMATED: CPT | Performed by: PHYSICIAN ASSISTANT

## 2017-06-11 PROCEDURE — 80053 COMPREHEN METABOLIC PANEL: CPT | Performed by: PHYSICIAN ASSISTANT

## 2017-06-11 PROCEDURE — 86900 BLOOD TYPING SEROLOGIC ABO: CPT | Performed by: INTERNAL MEDICINE

## 2017-06-11 PROCEDURE — P9021 RED BLOOD CELLS UNIT: HCPCS

## 2017-06-11 PROCEDURE — 94760 N-INVAS EAR/PLS OXIMETRY 1: CPT

## 2017-06-11 PROCEDURE — 86850 RBC ANTIBODY SCREEN: CPT | Performed by: INTERNAL MEDICINE

## 2017-06-11 PROCEDURE — 85610 PROTHROMBIN TIME: CPT | Performed by: INTERNAL MEDICINE

## 2017-06-11 PROCEDURE — 94640 AIRWAY INHALATION TREATMENT: CPT

## 2017-06-11 PROCEDURE — 85049 AUTOMATED PLATELET COUNT: CPT | Performed by: PHYSICIAN ASSISTANT

## 2017-06-11 PROCEDURE — 86901 BLOOD TYPING SEROLOGIC RH(D): CPT | Performed by: INTERNAL MEDICINE

## 2017-06-11 RX ORDER — IPRATROPIUM BROMIDE AND ALBUTEROL SULFATE 2.5; .5 MG/3ML; MG/3ML
3 SOLUTION RESPIRATORY (INHALATION) 3 TIMES DAILY PRN
Status: DISCONTINUED | OUTPATIENT
Start: 2017-06-11 | End: 2017-06-23 | Stop reason: HOSPADM

## 2017-06-11 RX ORDER — METHYLPREDNISOLONE SODIUM SUCCINATE 40 MG/ML
40 INJECTION, POWDER, LYOPHILIZED, FOR SOLUTION INTRAMUSCULAR; INTRAVENOUS EVERY 12 HOURS SCHEDULED
Status: COMPLETED | OUTPATIENT
Start: 2017-06-11 | End: 2017-06-12

## 2017-06-11 RX ORDER — BENZONATATE 100 MG/1
100 CAPSULE ORAL 3 TIMES DAILY PRN
Status: DISCONTINUED | OUTPATIENT
Start: 2017-06-11 | End: 2017-06-23 | Stop reason: HOSPADM

## 2017-06-11 RX ORDER — OXYCODONE HYDROCHLORIDE 5 MG/1
5 TABLET ORAL EVERY 4 HOURS PRN
Status: DISCONTINUED | OUTPATIENT
Start: 2017-06-11 | End: 2017-06-21

## 2017-06-11 RX ADMIN — Medication 800 UNITS: at 08:16

## 2017-06-11 RX ADMIN — HEPARIN SODIUM 5000 UNITS: 5000 INJECTION, SOLUTION INTRAVENOUS; SUBCUTANEOUS at 05:41

## 2017-06-11 RX ADMIN — ALUMINUM HYDROXIDE, MAGNESIUM HYDROXIDE, AND SIMETHICONE 30 ML: 200; 200; 20 SUSPENSION ORAL at 17:06

## 2017-06-11 RX ADMIN — BUDESONIDE AND FORMOTEROL FUMARATE DIHYDRATE 2 PUFF: 160; 4.5 AEROSOL RESPIRATORY (INHALATION) at 17:06

## 2017-06-11 RX ADMIN — SODIUM CHLORIDE 100 ML/HR: 0.9 INJECTION, SOLUTION INTRAVENOUS at 04:21

## 2017-06-11 RX ADMIN — ALUMINUM HYDROXIDE, MAGNESIUM HYDROXIDE, AND SIMETHICONE 30 ML: 200; 200; 20 SUSPENSION ORAL at 08:16

## 2017-06-11 RX ADMIN — ALUMINUM HYDROXIDE, MAGNESIUM HYDROXIDE, AND SIMETHICONE 30 ML: 200; 200; 20 SUSPENSION ORAL at 21:27

## 2017-06-11 RX ADMIN — BENZONATATE 100 MG: 100 CAPSULE ORAL at 21:27

## 2017-06-11 RX ADMIN — IPRATROPIUM BROMIDE 0.5 MG: 0.5 SOLUTION RESPIRATORY (INHALATION) at 08:22

## 2017-06-11 RX ADMIN — DILTIAZEM HYDROCHLORIDE 30 MG: 30 TABLET, FILM COATED ORAL at 12:51

## 2017-06-11 RX ADMIN — OXYCODONE HYDROCHLORIDE 5 MG: 5 TABLET ORAL at 21:27

## 2017-06-11 RX ADMIN — DILTIAZEM HYDROCHLORIDE 30 MG: 30 TABLET, FILM COATED ORAL at 05:42

## 2017-06-11 RX ADMIN — METHYLPREDNISOLONE SODIUM SUCCINATE 40 MG: 40 INJECTION, POWDER, FOR SOLUTION INTRAMUSCULAR; INTRAVENOUS at 05:41

## 2017-06-11 RX ADMIN — OXYCODONE HYDROCHLORIDE 5 MG: 5 TABLET ORAL at 13:24

## 2017-06-11 RX ADMIN — ALUMINUM HYDROXIDE, MAGNESIUM HYDROXIDE, AND SIMETHICONE 30 ML: 200; 200; 20 SUSPENSION ORAL at 02:18

## 2017-06-11 RX ADMIN — ALBUTEROL SULFATE 2.5 MG: 2.5 SOLUTION RESPIRATORY (INHALATION) at 08:22

## 2017-06-11 RX ADMIN — DILTIAZEM HYDROCHLORIDE 30 MG: 30 TABLET, FILM COATED ORAL at 23:56

## 2017-06-11 RX ADMIN — BUDESONIDE AND FORMOTEROL FUMARATE DIHYDRATE 2 PUFF: 160; 4.5 AEROSOL RESPIRATORY (INHALATION) at 08:16

## 2017-06-11 RX ADMIN — METHYLPREDNISOLONE SODIUM SUCCINATE 40 MG: 40 INJECTION, POWDER, FOR SOLUTION INTRAMUSCULAR; INTRAVENOUS at 21:28

## 2017-06-11 RX ADMIN — DILTIAZEM HYDROCHLORIDE 30 MG: 30 TABLET, FILM COATED ORAL at 17:05

## 2017-06-11 RX ADMIN — SULFAMETHOXAZOLE AND TRIMETHOPRIM 1 TABLET: 400; 80 TABLET ORAL at 08:16

## 2017-06-11 RX ADMIN — ALUMINUM HYDROXIDE, MAGNESIUM HYDROXIDE, AND SIMETHICONE 30 ML: 200; 200; 20 SUSPENSION ORAL at 12:51

## 2017-06-11 RX ADMIN — IPRATROPIUM BROMIDE AND ALBUTEROL SULFATE 3 ML: .5; 3 SOLUTION RESPIRATORY (INHALATION) at 20:29

## 2017-06-11 RX ADMIN — FERROUS SULFATE TAB 325 MG (65 MG ELEMENTAL FE) 325 MG: 325 (65 FE) TAB at 08:16

## 2017-06-12 LAB
ANION GAP SERPL CALCULATED.3IONS-SCNC: 6 MMOL/L (ref 4–13)
ANISOCYTOSIS BLD QL SMEAR: PRESENT
BASOPHILS # BLD MANUAL: 0 THOUSAND/UL (ref 0–0.1)
BASOPHILS NFR MAR MANUAL: 0 % (ref 0–1)
BUN SERPL-MCNC: 33 MG/DL (ref 5–25)
CALCIUM SERPL-MCNC: 8.9 MG/DL (ref 8.3–10.1)
CHLORIDE SERPL-SCNC: 104 MMOL/L (ref 100–108)
CO2 SERPL-SCNC: 27 MMOL/L (ref 21–32)
CREAT SERPL-MCNC: 1.04 MG/DL (ref 0.6–1.3)
EOSINOPHIL # BLD MANUAL: 0 THOUSAND/UL (ref 0–0.4)
EOSINOPHIL NFR BLD MANUAL: 0 % (ref 0–6)
ERYTHROCYTE [DISTWIDTH] IN BLOOD BY AUTOMATED COUNT: 18.7 % (ref 11.6–15.1)
GFR SERPL CREATININE-BSD FRML MDRD: 53.2 ML/MIN/1.73SQ M
GLUCOSE SERPL-MCNC: 123 MG/DL (ref 65–140)
HCT VFR BLD AUTO: 27.7 % (ref 34.8–46.1)
HGB BLD-MCNC: 8.8 G/DL (ref 11.5–15.4)
LYMPHOCYTES # BLD AUTO: 0.15 THOUSAND/UL (ref 0.6–4.47)
LYMPHOCYTES # BLD AUTO: 16 % (ref 14–44)
MCH RBC QN AUTO: 29.2 PG (ref 26.8–34.3)
MCHC RBC AUTO-ENTMCNC: 31.8 G/DL (ref 31.4–37.4)
MCV RBC AUTO: 92 FL (ref 82–98)
METAMYELOCYTES NFR BLD MANUAL: 3 % (ref 0–1)
MONOCYTES # BLD AUTO: 0.03 THOUSAND/UL (ref 0–1.22)
MONOCYTES NFR BLD: 3 % (ref 4–12)
NEUTROPHILS # BLD MANUAL: 0.43 THOUSAND/UL (ref 1.85–7.62)
NEUTS BAND NFR BLD MANUAL: 22 % (ref 0–8)
NEUTS SEG NFR BLD AUTO: 23 % (ref 43–75)
NRBC BLD AUTO-RTO: 0 /100 WBCS
OVALOCYTES BLD QL SMEAR: PRESENT
PLATELET # BLD AUTO: 47 THOUSANDS/UL (ref 149–390)
PLATELET BLD QL SMEAR: ABNORMAL
PMV BLD AUTO: 11.1 FL (ref 8.9–12.7)
POLYCHROMASIA BLD QL SMEAR: PRESENT
POTASSIUM SERPL-SCNC: 5 MMOL/L (ref 3.5–5.3)
RBC # BLD AUTO: 3.01 MILLION/UL (ref 3.81–5.12)
SCHISTOCYTES BLD QL SMEAR: PRESENT
SODIUM SERPL-SCNC: 137 MMOL/L (ref 136–145)
TOTAL CELLS COUNTED SPEC: 73
UNIDENT CELLS # BLD: 33 % (ref 0–0)
WBC # BLD AUTO: 0.95 THOUSAND/UL (ref 4.31–10.16)

## 2017-06-12 PROCEDURE — 85007 BL SMEAR W/DIFF WBC COUNT: CPT | Performed by: INTERNAL MEDICINE

## 2017-06-12 PROCEDURE — 80048 BASIC METABOLIC PNL TOTAL CA: CPT | Performed by: INTERNAL MEDICINE

## 2017-06-12 PROCEDURE — 94760 N-INVAS EAR/PLS OXIMETRY 1: CPT

## 2017-06-12 PROCEDURE — 94640 AIRWAY INHALATION TREATMENT: CPT

## 2017-06-12 PROCEDURE — 85027 COMPLETE CBC AUTOMATED: CPT | Performed by: INTERNAL MEDICINE

## 2017-06-12 RX ORDER — PREDNISONE 20 MG/1
40 TABLET ORAL DAILY
Status: DISCONTINUED | OUTPATIENT
Start: 2017-06-13 | End: 2017-06-23 | Stop reason: HOSPADM

## 2017-06-12 RX ADMIN — IPRATROPIUM BROMIDE AND ALBUTEROL SULFATE 3 ML: .5; 3 SOLUTION RESPIRATORY (INHALATION) at 13:12

## 2017-06-12 RX ADMIN — IPRATROPIUM BROMIDE AND ALBUTEROL SULFATE 3 ML: .5; 3 SOLUTION RESPIRATORY (INHALATION) at 20:11

## 2017-06-12 RX ADMIN — OXYCODONE HYDROCHLORIDE 5 MG: 5 TABLET ORAL at 20:03

## 2017-06-12 RX ADMIN — BUDESONIDE AND FORMOTEROL FUMARATE DIHYDRATE 2 PUFF: 160; 4.5 AEROSOL RESPIRATORY (INHALATION) at 08:23

## 2017-06-12 RX ADMIN — METHYLPREDNISOLONE SODIUM SUCCINATE 40 MG: 40 INJECTION, POWDER, FOR SOLUTION INTRAMUSCULAR; INTRAVENOUS at 20:03

## 2017-06-12 RX ADMIN — Medication 800 UNITS: at 08:24

## 2017-06-12 RX ADMIN — FERROUS SULFATE TAB 325 MG (65 MG ELEMENTAL FE) 325 MG: 325 (65 FE) TAB at 08:24

## 2017-06-12 RX ADMIN — BUDESONIDE AND FORMOTEROL FUMARATE DIHYDRATE 2 PUFF: 160; 4.5 AEROSOL RESPIRATORY (INHALATION) at 17:42

## 2017-06-12 RX ADMIN — IPRATROPIUM BROMIDE AND ALBUTEROL SULFATE 3 ML: .5; 3 SOLUTION RESPIRATORY (INHALATION) at 08:02

## 2017-06-12 RX ADMIN — BENZONATATE 100 MG: 100 CAPSULE ORAL at 08:24

## 2017-06-12 RX ADMIN — OXYCODONE HYDROCHLORIDE 5 MG: 5 TABLET ORAL at 08:24

## 2017-06-12 RX ADMIN — METHYLPREDNISOLONE SODIUM SUCCINATE 40 MG: 40 INJECTION, POWDER, FOR SOLUTION INTRAMUSCULAR; INTRAVENOUS at 08:23

## 2017-06-12 RX ADMIN — BENZONATATE 100 MG: 100 CAPSULE ORAL at 20:03

## 2017-06-12 RX ADMIN — SULFAMETHOXAZOLE AND TRIMETHOPRIM 1 TABLET: 400; 80 TABLET ORAL at 08:24

## 2017-06-12 RX ADMIN — DILTIAZEM HYDROCHLORIDE 30 MG: 30 TABLET, FILM COATED ORAL at 12:24

## 2017-06-12 RX ADMIN — BENZONATATE 100 MG: 100 CAPSULE ORAL at 13:32

## 2017-06-12 RX ADMIN — DILTIAZEM HYDROCHLORIDE 30 MG: 30 TABLET, FILM COATED ORAL at 17:42

## 2017-06-12 RX ADMIN — ALUMINUM HYDROXIDE, MAGNESIUM HYDROXIDE, AND SIMETHICONE 30 ML: 200; 200; 20 SUSPENSION ORAL at 20:03

## 2017-06-12 RX ADMIN — DILTIAZEM HYDROCHLORIDE 30 MG: 30 TABLET, FILM COATED ORAL at 05:14

## 2017-06-13 PROCEDURE — 94760 N-INVAS EAR/PLS OXIMETRY 1: CPT

## 2017-06-13 PROCEDURE — 94640 AIRWAY INHALATION TREATMENT: CPT

## 2017-06-13 RX ADMIN — BENZONATATE 100 MG: 100 CAPSULE ORAL at 09:58

## 2017-06-13 RX ADMIN — DILTIAZEM HYDROCHLORIDE 30 MG: 30 TABLET, FILM COATED ORAL at 12:03

## 2017-06-13 RX ADMIN — IPRATROPIUM BROMIDE AND ALBUTEROL SULFATE 3 ML: .5; 3 SOLUTION RESPIRATORY (INHALATION) at 20:03

## 2017-06-13 RX ADMIN — DILTIAZEM HYDROCHLORIDE 30 MG: 30 TABLET, FILM COATED ORAL at 00:10

## 2017-06-13 RX ADMIN — SULFAMETHOXAZOLE AND TRIMETHOPRIM 1 TABLET: 400; 80 TABLET ORAL at 08:26

## 2017-06-13 RX ADMIN — OXYCODONE HYDROCHLORIDE 5 MG: 5 TABLET ORAL at 05:41

## 2017-06-13 RX ADMIN — BUDESONIDE AND FORMOTEROL FUMARATE DIHYDRATE 2 PUFF: 160; 4.5 AEROSOL RESPIRATORY (INHALATION) at 08:26

## 2017-06-13 RX ADMIN — BENZONATATE 100 MG: 100 CAPSULE ORAL at 16:55

## 2017-06-13 RX ADMIN — OXYCODONE HYDROCHLORIDE 5 MG: 5 TABLET ORAL at 22:28

## 2017-06-13 RX ADMIN — IPRATROPIUM BROMIDE AND ALBUTEROL SULFATE 3 ML: .5; 3 SOLUTION RESPIRATORY (INHALATION) at 07:35

## 2017-06-13 RX ADMIN — Medication 800 UNITS: at 08:26

## 2017-06-13 RX ADMIN — TBO-FILGRASTIM 480 MCG: 480 INJECTION, SOLUTION SUBCUTANEOUS at 12:36

## 2017-06-13 RX ADMIN — BUDESONIDE AND FORMOTEROL FUMARATE DIHYDRATE 2 PUFF: 160; 4.5 AEROSOL RESPIRATORY (INHALATION) at 17:49

## 2017-06-13 RX ADMIN — DILTIAZEM HYDROCHLORIDE 30 MG: 30 TABLET, FILM COATED ORAL at 05:41

## 2017-06-13 RX ADMIN — PREDNISONE 40 MG: 20 TABLET ORAL at 08:26

## 2017-06-13 RX ADMIN — DILTIAZEM HYDROCHLORIDE 30 MG: 30 TABLET, FILM COATED ORAL at 17:49

## 2017-06-13 RX ADMIN — FERROUS SULFATE TAB 325 MG (65 MG ELEMENTAL FE) 325 MG: 325 (65 FE) TAB at 08:26

## 2017-06-13 RX ADMIN — BENZONATATE 100 MG: 100 CAPSULE ORAL at 22:28

## 2017-06-14 LAB
ANISOCYTOSIS BLD QL SMEAR: PRESENT
BASOPHILS # BLD MANUAL: 0 THOUSAND/UL (ref 0–0.1)
BASOPHILS NFR MAR MANUAL: 0 % (ref 0–1)
EOSINOPHIL # BLD MANUAL: 0 THOUSAND/UL (ref 0–0.4)
EOSINOPHIL NFR BLD MANUAL: 0 % (ref 0–6)
ERYTHROCYTE [DISTWIDTH] IN BLOOD BY AUTOMATED COUNT: 18.1 % (ref 11.6–15.1)
HCT VFR BLD AUTO: 29.1 % (ref 34.8–46.1)
HGB BLD-MCNC: 9 G/DL (ref 11.5–15.4)
LYMPHOCYTES # BLD AUTO: 0.09 THOUSAND/UL (ref 0.6–4.47)
LYMPHOCYTES # BLD AUTO: 24 % (ref 14–44)
MCH RBC QN AUTO: 28.7 PG (ref 26.8–34.3)
MCHC RBC AUTO-ENTMCNC: 30.9 G/DL (ref 31.4–37.4)
MCV RBC AUTO: 93 FL (ref 82–98)
METAMYELOCYTES NFR BLD MANUAL: 5 % (ref 0–1)
MONOCYTES # BLD AUTO: 0.03 THOUSAND/UL (ref 0–1.22)
MONOCYTES NFR BLD: 8 % (ref 4–12)
NEUTROPHILS # BLD MANUAL: 0.25 THOUSAND/UL (ref 1.85–7.62)
NEUTS BAND NFR BLD MANUAL: 2 % (ref 0–8)
NEUTS SEG NFR BLD AUTO: 61 % (ref 43–75)
OVALOCYTES BLD QL SMEAR: PRESENT
PLATELET # BLD AUTO: 33 THOUSANDS/UL (ref 149–390)
PLATELET BLD QL SMEAR: ABNORMAL
PMV BLD AUTO: 11.3 FL (ref 8.9–12.7)
RBC # BLD AUTO: 3.14 MILLION/UL (ref 3.81–5.12)
TOTAL CELLS COUNTED SPEC: 100
WBC # BLD AUTO: 0.39 THOUSAND/UL (ref 4.31–10.16)

## 2017-06-14 PROCEDURE — 85007 BL SMEAR W/DIFF WBC COUNT: CPT | Performed by: INTERNAL MEDICINE

## 2017-06-14 PROCEDURE — 94760 N-INVAS EAR/PLS OXIMETRY 1: CPT

## 2017-06-14 PROCEDURE — 94640 AIRWAY INHALATION TREATMENT: CPT

## 2017-06-14 PROCEDURE — 85027 COMPLETE CBC AUTOMATED: CPT | Performed by: INTERNAL MEDICINE

## 2017-06-14 RX ADMIN — OXYCODONE HYDROCHLORIDE 5 MG: 5 TABLET ORAL at 06:16

## 2017-06-14 RX ADMIN — ALUMINUM HYDROXIDE, MAGNESIUM HYDROXIDE, AND SIMETHICONE 30 ML: 200; 200; 20 SUSPENSION ORAL at 00:50

## 2017-06-14 RX ADMIN — OXYCODONE HYDROCHLORIDE 5 MG: 5 TABLET ORAL at 23:28

## 2017-06-14 RX ADMIN — OXYCODONE HYDROCHLORIDE 5 MG: 5 TABLET ORAL at 10:00

## 2017-06-14 RX ADMIN — SULFAMETHOXAZOLE AND TRIMETHOPRIM 1 TABLET: 400; 80 TABLET ORAL at 09:03

## 2017-06-14 RX ADMIN — ALUMINUM HYDROXIDE, MAGNESIUM HYDROXIDE, AND SIMETHICONE 30 ML: 200; 200; 20 SUSPENSION ORAL at 17:16

## 2017-06-14 RX ADMIN — Medication 800 UNITS: at 09:03

## 2017-06-14 RX ADMIN — DILTIAZEM HYDROCHLORIDE 30 MG: 30 TABLET, FILM COATED ORAL at 23:28

## 2017-06-14 RX ADMIN — DILTIAZEM HYDROCHLORIDE 30 MG: 30 TABLET, FILM COATED ORAL at 06:16

## 2017-06-14 RX ADMIN — BENZONATATE 100 MG: 100 CAPSULE ORAL at 06:22

## 2017-06-14 RX ADMIN — BENZONATATE 100 MG: 100 CAPSULE ORAL at 23:28

## 2017-06-14 RX ADMIN — DILTIAZEM HYDROCHLORIDE 30 MG: 30 TABLET, FILM COATED ORAL at 00:50

## 2017-06-14 RX ADMIN — PREDNISONE 40 MG: 20 TABLET ORAL at 09:02

## 2017-06-14 RX ADMIN — IPRATROPIUM BROMIDE AND ALBUTEROL SULFATE 3 ML: .5; 3 SOLUTION RESPIRATORY (INHALATION) at 13:09

## 2017-06-14 RX ADMIN — IPRATROPIUM BROMIDE AND ALBUTEROL SULFATE 3 ML: .5; 3 SOLUTION RESPIRATORY (INHALATION) at 07:26

## 2017-06-14 RX ADMIN — DILTIAZEM HYDROCHLORIDE 30 MG: 30 TABLET, FILM COATED ORAL at 12:43

## 2017-06-14 RX ADMIN — FERROUS SULFATE TAB 325 MG (65 MG ELEMENTAL FE) 325 MG: 325 (65 FE) TAB at 09:03

## 2017-06-14 RX ADMIN — TBO-FILGRASTIM 480 MCG: 480 INJECTION, SOLUTION SUBCUTANEOUS at 09:03

## 2017-06-14 RX ADMIN — BUDESONIDE AND FORMOTEROL FUMARATE DIHYDRATE 2 PUFF: 160; 4.5 AEROSOL RESPIRATORY (INHALATION) at 09:03

## 2017-06-14 RX ADMIN — DILTIAZEM HYDROCHLORIDE 30 MG: 30 TABLET, FILM COATED ORAL at 17:16

## 2017-06-14 RX ADMIN — OXYCODONE HYDROCHLORIDE 5 MG: 5 TABLET ORAL at 19:08

## 2017-06-14 RX ADMIN — BUDESONIDE AND FORMOTEROL FUMARATE DIHYDRATE 2 PUFF: 160; 4.5 AEROSOL RESPIRATORY (INHALATION) at 17:16

## 2017-06-15 LAB
ABO GROUP BLD BPU: NORMAL
ABO GROUP BLD BPU: NORMAL
ANION GAP SERPL CALCULATED.3IONS-SCNC: 4 MMOL/L (ref 4–13)
ANISOCYTOSIS BLD QL SMEAR: PRESENT
BACTERIA BLD CULT: NORMAL
BACTERIA BLD CULT: NORMAL
BASOPHILS # BLD MANUAL: 0 THOUSAND/UL (ref 0–0.1)
BASOPHILS NFR MAR MANUAL: 0 % (ref 0–1)
BPU ID: NORMAL
BPU ID: NORMAL
BUN SERPL-MCNC: 25 MG/DL (ref 5–25)
CALCIUM SERPL-MCNC: 9.8 MG/DL (ref 8.3–10.1)
CHLORIDE SERPL-SCNC: 99 MMOL/L (ref 100–108)
CO2 SERPL-SCNC: 30 MMOL/L (ref 21–32)
CREAT SERPL-MCNC: 0.92 MG/DL (ref 0.6–1.3)
EOSINOPHIL # BLD MANUAL: 0 THOUSAND/UL (ref 0–0.4)
EOSINOPHIL NFR BLD MANUAL: 0 % (ref 0–6)
ERYTHROCYTE [DISTWIDTH] IN BLOOD BY AUTOMATED COUNT: 18.4 % (ref 11.6–15.1)
GFR SERPL CREATININE-BSD FRML MDRD: >60 ML/MIN/1.73SQ M
GLUCOSE SERPL-MCNC: 112 MG/DL (ref 65–140)
HCT VFR BLD AUTO: 31.9 % (ref 34.8–46.1)
HGB BLD-MCNC: 10.4 G/DL (ref 11.5–15.4)
LYMPHOCYTES # BLD AUTO: 0.14 THOUSAND/UL (ref 0.6–4.47)
LYMPHOCYTES # BLD AUTO: 14 % (ref 14–44)
MCH RBC QN AUTO: 29.6 PG (ref 26.8–34.3)
MCHC RBC AUTO-ENTMCNC: 32.6 G/DL (ref 31.4–37.4)
MCV RBC AUTO: 91 FL (ref 82–98)
METAMYELOCYTES NFR BLD MANUAL: 1 % (ref 0–1)
MONOCYTES # BLD AUTO: 0.13 THOUSAND/UL (ref 0–1.22)
MONOCYTES NFR BLD: 13 % (ref 4–12)
NEUTROPHILS # BLD MANUAL: 0.72 THOUSAND/UL (ref 1.85–7.62)
NEUTS BAND NFR BLD MANUAL: 9 % (ref 0–8)
NEUTS SEG NFR BLD AUTO: 62 % (ref 43–75)
NRBC BLD AUTO-RTO: 0 /100 WBCS
OVALOCYTES BLD QL SMEAR: PRESENT
PLATELET # BLD AUTO: 33 THOUSANDS/UL (ref 149–390)
PLATELET BLD QL SMEAR: ABNORMAL
POTASSIUM SERPL-SCNC: 5.5 MMOL/L (ref 3.5–5.3)
RBC # BLD AUTO: 3.51 MILLION/UL (ref 3.81–5.12)
SODIUM SERPL-SCNC: 133 MMOL/L (ref 136–145)
TOTAL CELLS COUNTED SPEC: 100
UNIT DISPENSE STATUS: NORMAL
UNIT DISPENSE STATUS: NORMAL
UNIT PRODUCT CODE: NORMAL
UNIT PRODUCT CODE: NORMAL
UNIT RH: NORMAL
UNIT RH: NORMAL
VARIANT LYMPHS # BLD AUTO: 1 %
WBC # BLD AUTO: 1.02 THOUSAND/UL (ref 4.31–10.16)

## 2017-06-15 PROCEDURE — 80048 BASIC METABOLIC PNL TOTAL CA: CPT | Performed by: INTERNAL MEDICINE

## 2017-06-15 PROCEDURE — 85007 BL SMEAR W/DIFF WBC COUNT: CPT | Performed by: INTERNAL MEDICINE

## 2017-06-15 PROCEDURE — 94760 N-INVAS EAR/PLS OXIMETRY 1: CPT

## 2017-06-15 PROCEDURE — 85027 COMPLETE CBC AUTOMATED: CPT | Performed by: INTERNAL MEDICINE

## 2017-06-15 RX ORDER — SODIUM CHLORIDE 9 MG/ML
100 INJECTION, SOLUTION INTRAVENOUS ONCE
Status: COMPLETED | OUTPATIENT
Start: 2017-06-15 | End: 2017-06-15

## 2017-06-15 RX ORDER — SODIUM POLYSTYRENE SULFONATE 15 G/60ML
15 SUSPENSION ORAL; RECTAL ONCE
Status: COMPLETED | OUTPATIENT
Start: 2017-06-15 | End: 2017-06-15

## 2017-06-15 RX ADMIN — BENZONATATE 100 MG: 100 CAPSULE ORAL at 16:32

## 2017-06-15 RX ADMIN — DILTIAZEM HYDROCHLORIDE 30 MG: 30 TABLET, FILM COATED ORAL at 17:28

## 2017-06-15 RX ADMIN — BENZONATATE 100 MG: 100 CAPSULE ORAL at 12:40

## 2017-06-15 RX ADMIN — PREDNISONE 40 MG: 20 TABLET ORAL at 08:05

## 2017-06-15 RX ADMIN — FERROUS SULFATE TAB 325 MG (65 MG ELEMENTAL FE) 325 MG: 325 (65 FE) TAB at 08:06

## 2017-06-15 RX ADMIN — OXYCODONE HYDROCHLORIDE 5 MG: 5 TABLET ORAL at 16:30

## 2017-06-15 RX ADMIN — DILTIAZEM HYDROCHLORIDE 30 MG: 30 TABLET, FILM COATED ORAL at 23:39

## 2017-06-15 RX ADMIN — DILTIAZEM HYDROCHLORIDE 30 MG: 30 TABLET, FILM COATED ORAL at 05:25

## 2017-06-15 RX ADMIN — SULFAMETHOXAZOLE AND TRIMETHOPRIM 1 TABLET: 400; 80 TABLET ORAL at 08:05

## 2017-06-15 RX ADMIN — SODIUM CHLORIDE 100 ML/HR: 0.9 INJECTION, SOLUTION INTRAVENOUS at 10:22

## 2017-06-15 RX ADMIN — DILTIAZEM HYDROCHLORIDE 30 MG: 30 TABLET, FILM COATED ORAL at 12:39

## 2017-06-15 RX ADMIN — OXYCODONE HYDROCHLORIDE 5 MG: 5 TABLET ORAL at 08:05

## 2017-06-15 RX ADMIN — OXYCODONE HYDROCHLORIDE 5 MG: 5 TABLET ORAL at 04:00

## 2017-06-15 RX ADMIN — Medication 800 UNITS: at 08:05

## 2017-06-15 RX ADMIN — BUDESONIDE AND FORMOTEROL FUMARATE DIHYDRATE 2 PUFF: 160; 4.5 AEROSOL RESPIRATORY (INHALATION) at 17:28

## 2017-06-15 RX ADMIN — TBO-FILGRASTIM 300 MCG: 480 INJECTION, SOLUTION SUBCUTANEOUS at 08:06

## 2017-06-15 RX ADMIN — SODIUM POLYSTYRENE SULFONATE 15 G: 15 SUSPENSION ORAL; RECTAL at 10:22

## 2017-06-15 RX ADMIN — BUDESONIDE AND FORMOTEROL FUMARATE DIHYDRATE 2 PUFF: 160; 4.5 AEROSOL RESPIRATORY (INHALATION) at 08:05

## 2017-06-16 ENCOUNTER — APPOINTMENT (INPATIENT)
Dept: PHYSICAL THERAPY | Facility: HOSPITAL | Age: 67
DRG: 189 | End: 2017-06-16
Payer: COMMERCIAL

## 2017-06-16 LAB
ANION GAP SERPL CALCULATED.3IONS-SCNC: 5 MMOL/L (ref 4–13)
ANISOCYTOSIS BLD QL SMEAR: PRESENT
BASOPHILS # BLD MANUAL: 0 THOUSAND/UL (ref 0–0.1)
BASOPHILS NFR MAR MANUAL: 0 % (ref 0–1)
BUN SERPL-MCNC: 29 MG/DL (ref 5–25)
CALCIUM SERPL-MCNC: 8.8 MG/DL (ref 8.3–10.1)
CHLORIDE SERPL-SCNC: 100 MMOL/L (ref 100–108)
CO2 SERPL-SCNC: 29 MMOL/L (ref 21–32)
CREAT SERPL-MCNC: 0.83 MG/DL (ref 0.6–1.3)
EOSINOPHIL # BLD MANUAL: 0.06 THOUSAND/UL (ref 0–0.4)
EOSINOPHIL NFR BLD MANUAL: 2 % (ref 0–6)
ERYTHROCYTE [DISTWIDTH] IN BLOOD BY AUTOMATED COUNT: 18.1 % (ref 11.6–15.1)
GFR SERPL CREATININE-BSD FRML MDRD: >60 ML/MIN/1.73SQ M
GLUCOSE SERPL-MCNC: 74 MG/DL (ref 65–140)
HCT VFR BLD AUTO: 26.6 % (ref 34.8–46.1)
HGB BLD-MCNC: 8.3 G/DL (ref 11.5–15.4)
LYMPHOCYTES # BLD AUTO: 0.26 THOUSAND/UL (ref 0.6–4.47)
LYMPHOCYTES # BLD AUTO: 9 % (ref 14–44)
MCH RBC QN AUTO: 29.1 PG (ref 26.8–34.3)
MCHC RBC AUTO-ENTMCNC: 31.2 G/DL (ref 31.4–37.4)
MCV RBC AUTO: 93 FL (ref 82–98)
METAMYELOCYTES NFR BLD MANUAL: 1 % (ref 0–1)
MONOCYTES # BLD AUTO: 0.03 THOUSAND/UL (ref 0–1.22)
MONOCYTES NFR BLD: 1 % (ref 4–12)
MYELOCYTES NFR BLD MANUAL: 1 % (ref 0–1)
NEUTROPHILS # BLD MANUAL: 2.45 THOUSAND/UL (ref 1.85–7.62)
NEUTS BAND NFR BLD MANUAL: 40 % (ref 0–8)
NEUTS SEG NFR BLD AUTO: 45 % (ref 43–75)
PLATELET # BLD AUTO: 30 THOUSANDS/UL (ref 149–390)
PLATELET BLD QL SMEAR: ABNORMAL
PMV BLD AUTO: 11.9 FL (ref 8.9–12.7)
POTASSIUM SERPL-SCNC: 5 MMOL/L (ref 3.5–5.3)
RBC # BLD AUTO: 2.85 MILLION/UL (ref 3.81–5.12)
SODIUM SERPL-SCNC: 134 MMOL/L (ref 136–145)
TOTAL CELLS COUNTED SPEC: 100
VARIANT LYMPHS # BLD AUTO: 1 %
WBC # BLD AUTO: 2.88 THOUSAND/UL (ref 4.31–10.16)

## 2017-06-16 PROCEDURE — 85007 BL SMEAR W/DIFF WBC COUNT: CPT | Performed by: INTERNAL MEDICINE

## 2017-06-16 PROCEDURE — 94640 AIRWAY INHALATION TREATMENT: CPT

## 2017-06-16 PROCEDURE — 97163 PT EVAL HIGH COMPLEX 45 MIN: CPT

## 2017-06-16 PROCEDURE — G8979 MOBILITY GOAL STATUS: HCPCS

## 2017-06-16 PROCEDURE — 85027 COMPLETE CBC AUTOMATED: CPT | Performed by: INTERNAL MEDICINE

## 2017-06-16 PROCEDURE — G8978 MOBILITY CURRENT STATUS: HCPCS

## 2017-06-16 PROCEDURE — 94760 N-INVAS EAR/PLS OXIMETRY 1: CPT

## 2017-06-16 PROCEDURE — 80048 BASIC METABOLIC PNL TOTAL CA: CPT | Performed by: INTERNAL MEDICINE

## 2017-06-16 RX ADMIN — FERROUS SULFATE TAB 325 MG (65 MG ELEMENTAL FE) 325 MG: 325 (65 FE) TAB at 08:54

## 2017-06-16 RX ADMIN — IPRATROPIUM BROMIDE AND ALBUTEROL SULFATE 3 ML: .5; 3 SOLUTION RESPIRATORY (INHALATION) at 07:30

## 2017-06-16 RX ADMIN — OXYCODONE HYDROCHLORIDE 5 MG: 5 TABLET ORAL at 17:01

## 2017-06-16 RX ADMIN — BUDESONIDE AND FORMOTEROL FUMARATE DIHYDRATE 2 PUFF: 160; 4.5 AEROSOL RESPIRATORY (INHALATION) at 08:54

## 2017-06-16 RX ADMIN — OXYCODONE HYDROCHLORIDE 5 MG: 5 TABLET ORAL at 05:38

## 2017-06-16 RX ADMIN — Medication 800 UNITS: at 08:53

## 2017-06-16 RX ADMIN — BENZONATATE 100 MG: 100 CAPSULE ORAL at 17:02

## 2017-06-16 RX ADMIN — SULFAMETHOXAZOLE AND TRIMETHOPRIM 1 TABLET: 400; 80 TABLET ORAL at 08:54

## 2017-06-16 RX ADMIN — DILTIAZEM HYDROCHLORIDE 30 MG: 30 TABLET, FILM COATED ORAL at 23:38

## 2017-06-16 RX ADMIN — TBO-FILGRASTIM 300 MCG: 480 INJECTION, SOLUTION SUBCUTANEOUS at 08:54

## 2017-06-16 RX ADMIN — DILTIAZEM HYDROCHLORIDE 30 MG: 30 TABLET, FILM COATED ORAL at 12:12

## 2017-06-16 RX ADMIN — DILTIAZEM HYDROCHLORIDE 30 MG: 30 TABLET, FILM COATED ORAL at 05:25

## 2017-06-16 RX ADMIN — PREDNISONE 40 MG: 20 TABLET ORAL at 08:54

## 2017-06-16 RX ADMIN — BUDESONIDE AND FORMOTEROL FUMARATE DIHYDRATE 2 PUFF: 160; 4.5 AEROSOL RESPIRATORY (INHALATION) at 17:03

## 2017-06-16 RX ADMIN — DILTIAZEM HYDROCHLORIDE 30 MG: 30 TABLET, FILM COATED ORAL at 17:01

## 2017-06-16 RX ADMIN — OXYCODONE HYDROCHLORIDE 5 MG: 5 TABLET ORAL at 01:37

## 2017-06-17 PROBLEM — R53.1 ASTHENIA DUE TO DISEASE: Status: ACTIVE | Noted: 2017-06-17

## 2017-06-17 RX ADMIN — DILTIAZEM HYDROCHLORIDE 30 MG: 30 TABLET, FILM COATED ORAL at 05:45

## 2017-06-17 RX ADMIN — BENZONATATE 100 MG: 100 CAPSULE ORAL at 10:35

## 2017-06-17 RX ADMIN — DILTIAZEM HYDROCHLORIDE 30 MG: 30 TABLET, FILM COATED ORAL at 17:46

## 2017-06-17 RX ADMIN — OXYCODONE HYDROCHLORIDE 5 MG: 5 TABLET ORAL at 05:45

## 2017-06-17 RX ADMIN — DILTIAZEM HYDROCHLORIDE 30 MG: 30 TABLET, FILM COATED ORAL at 13:00

## 2017-06-17 RX ADMIN — FERROUS SULFATE TAB 325 MG (65 MG ELEMENTAL FE) 325 MG: 325 (65 FE) TAB at 08:57

## 2017-06-17 RX ADMIN — TBO-FILGRASTIM 300 MCG: 480 INJECTION, SOLUTION SUBCUTANEOUS at 09:04

## 2017-06-17 RX ADMIN — OXYCODONE HYDROCHLORIDE 5 MG: 5 TABLET ORAL at 17:46

## 2017-06-17 RX ADMIN — BUDESONIDE AND FORMOTEROL FUMARATE DIHYDRATE 2 PUFF: 160; 4.5 AEROSOL RESPIRATORY (INHALATION) at 08:57

## 2017-06-17 RX ADMIN — BUDESONIDE AND FORMOTEROL FUMARATE DIHYDRATE 2 PUFF: 160; 4.5 AEROSOL RESPIRATORY (INHALATION) at 17:46

## 2017-06-17 RX ADMIN — PREDNISONE 40 MG: 20 TABLET ORAL at 08:58

## 2017-06-17 RX ADMIN — SULFAMETHOXAZOLE AND TRIMETHOPRIM 1 TABLET: 400; 80 TABLET ORAL at 08:58

## 2017-06-17 RX ADMIN — OXYCODONE HYDROCHLORIDE 5 MG: 5 TABLET ORAL at 00:02

## 2017-06-17 RX ADMIN — Medication 800 UNITS: at 10:36

## 2017-06-18 PROCEDURE — 94640 AIRWAY INHALATION TREATMENT: CPT

## 2017-06-18 PROCEDURE — 94760 N-INVAS EAR/PLS OXIMETRY 1: CPT

## 2017-06-18 RX ADMIN — DILTIAZEM HYDROCHLORIDE 30 MG: 30 TABLET, FILM COATED ORAL at 17:00

## 2017-06-18 RX ADMIN — SULFAMETHOXAZOLE AND TRIMETHOPRIM 1 TABLET: 400; 80 TABLET ORAL at 09:07

## 2017-06-18 RX ADMIN — OXYCODONE HYDROCHLORIDE 5 MG: 5 TABLET ORAL at 06:26

## 2017-06-18 RX ADMIN — PREDNISONE 40 MG: 20 TABLET ORAL at 09:06

## 2017-06-18 RX ADMIN — FERROUS SULFATE TAB 325 MG (65 MG ELEMENTAL FE) 325 MG: 325 (65 FE) TAB at 09:06

## 2017-06-18 RX ADMIN — OXYCODONE HYDROCHLORIDE 5 MG: 5 TABLET ORAL at 00:41

## 2017-06-18 RX ADMIN — IPRATROPIUM BROMIDE AND ALBUTEROL SULFATE 3 ML: .5; 3 SOLUTION RESPIRATORY (INHALATION) at 07:50

## 2017-06-18 RX ADMIN — BUDESONIDE AND FORMOTEROL FUMARATE DIHYDRATE 2 PUFF: 160; 4.5 AEROSOL RESPIRATORY (INHALATION) at 17:00

## 2017-06-18 RX ADMIN — BENZONATATE 100 MG: 100 CAPSULE ORAL at 09:06

## 2017-06-18 RX ADMIN — BUDESONIDE AND FORMOTEROL FUMARATE DIHYDRATE 2 PUFF: 160; 4.5 AEROSOL RESPIRATORY (INHALATION) at 09:08

## 2017-06-18 RX ADMIN — Medication 800 UNITS: at 09:07

## 2017-06-18 RX ADMIN — DILTIAZEM HYDROCHLORIDE 30 MG: 30 TABLET, FILM COATED ORAL at 12:42

## 2017-06-18 RX ADMIN — DILTIAZEM HYDROCHLORIDE 30 MG: 30 TABLET, FILM COATED ORAL at 00:41

## 2017-06-18 RX ADMIN — DILTIAZEM HYDROCHLORIDE 30 MG: 30 TABLET, FILM COATED ORAL at 06:26

## 2017-06-18 RX ADMIN — OXYCODONE HYDROCHLORIDE 5 MG: 5 TABLET ORAL at 16:45

## 2017-06-18 RX ADMIN — OXYCODONE HYDROCHLORIDE 5 MG: 5 TABLET ORAL at 21:07

## 2017-06-19 PROCEDURE — 97110 THERAPEUTIC EXERCISES: CPT

## 2017-06-19 RX ADMIN — OXYCODONE HYDROCHLORIDE 5 MG: 5 TABLET ORAL at 15:16

## 2017-06-19 RX ADMIN — PREDNISONE 40 MG: 20 TABLET ORAL at 09:42

## 2017-06-19 RX ADMIN — DILTIAZEM HYDROCHLORIDE 30 MG: 30 TABLET, FILM COATED ORAL at 01:00

## 2017-06-19 RX ADMIN — Medication 800 UNITS: at 09:42

## 2017-06-19 RX ADMIN — DILTIAZEM HYDROCHLORIDE 30 MG: 30 TABLET, FILM COATED ORAL at 05:13

## 2017-06-19 RX ADMIN — OXYCODONE HYDROCHLORIDE 5 MG: 5 TABLET ORAL at 01:03

## 2017-06-19 RX ADMIN — DILTIAZEM HYDROCHLORIDE 30 MG: 30 TABLET, FILM COATED ORAL at 23:00

## 2017-06-19 RX ADMIN — BUDESONIDE AND FORMOTEROL FUMARATE DIHYDRATE 2 PUFF: 160; 4.5 AEROSOL RESPIRATORY (INHALATION) at 17:39

## 2017-06-19 RX ADMIN — FERROUS SULFATE TAB 325 MG (65 MG ELEMENTAL FE) 325 MG: 325 (65 FE) TAB at 09:42

## 2017-06-19 RX ADMIN — OXYCODONE HYDROCHLORIDE 5 MG: 5 TABLET ORAL at 22:50

## 2017-06-19 RX ADMIN — OXYCODONE HYDROCHLORIDE 5 MG: 5 TABLET ORAL at 05:13

## 2017-06-19 RX ADMIN — BUDESONIDE AND FORMOTEROL FUMARATE DIHYDRATE 2 PUFF: 160; 4.5 AEROSOL RESPIRATORY (INHALATION) at 09:42

## 2017-06-19 RX ADMIN — OXYCODONE HYDROCHLORIDE 5 MG: 5 TABLET ORAL at 09:46

## 2017-06-19 RX ADMIN — DILTIAZEM HYDROCHLORIDE 30 MG: 30 TABLET, FILM COATED ORAL at 12:37

## 2017-06-19 RX ADMIN — SULFAMETHOXAZOLE AND TRIMETHOPRIM 1 TABLET: 400; 80 TABLET ORAL at 09:43

## 2017-06-19 RX ADMIN — DILTIAZEM HYDROCHLORIDE 30 MG: 30 TABLET, FILM COATED ORAL at 17:38

## 2017-06-20 LAB
MYCOBACTERIUM SPEC CULT: NORMAL
MYCOBACTERIUM SPEC CULT: NORMAL
RHODAMINE-AURAMINE STN SPEC: NORMAL
RHODAMINE-AURAMINE STN SPEC: NORMAL

## 2017-06-20 PROCEDURE — 97116 GAIT TRAINING THERAPY: CPT

## 2017-06-20 PROCEDURE — 97530 THERAPEUTIC ACTIVITIES: CPT

## 2017-06-20 RX ORDER — KETOROLAC TROMETHAMINE 30 MG/ML
15 INJECTION, SOLUTION INTRAMUSCULAR; INTRAVENOUS ONCE
Status: COMPLETED | OUTPATIENT
Start: 2017-06-20 | End: 2017-06-20

## 2017-06-20 RX ADMIN — SULFAMETHOXAZOLE AND TRIMETHOPRIM 1 TABLET: 400; 80 TABLET ORAL at 09:05

## 2017-06-20 RX ADMIN — PREDNISONE 40 MG: 20 TABLET ORAL at 09:05

## 2017-06-20 RX ADMIN — ACETAMINOPHEN 650 MG: 325 TABLET, FILM COATED ORAL at 09:28

## 2017-06-20 RX ADMIN — DILTIAZEM HYDROCHLORIDE 30 MG: 30 TABLET, FILM COATED ORAL at 06:15

## 2017-06-20 RX ADMIN — DILTIAZEM HYDROCHLORIDE 30 MG: 30 TABLET, FILM COATED ORAL at 11:05

## 2017-06-20 RX ADMIN — BUDESONIDE AND FORMOTEROL FUMARATE DIHYDRATE 2 PUFF: 160; 4.5 AEROSOL RESPIRATORY (INHALATION) at 09:05

## 2017-06-20 RX ADMIN — DILTIAZEM HYDROCHLORIDE 30 MG: 30 TABLET, FILM COATED ORAL at 17:03

## 2017-06-20 RX ADMIN — Medication 800 UNITS: at 09:05

## 2017-06-20 RX ADMIN — KETOROLAC TROMETHAMINE 15 MG: 30 INJECTION, SOLUTION INTRAMUSCULAR at 14:08

## 2017-06-20 RX ADMIN — FERROUS SULFATE TAB 325 MG (65 MG ELEMENTAL FE) 325 MG: 325 (65 FE) TAB at 09:05

## 2017-06-20 RX ADMIN — OXYCODONE HYDROCHLORIDE 5 MG: 5 TABLET ORAL at 20:58

## 2017-06-20 RX ADMIN — OXYCODONE HYDROCHLORIDE 5 MG: 5 TABLET ORAL at 11:04

## 2017-06-20 RX ADMIN — OXYCODONE HYDROCHLORIDE 5 MG: 5 TABLET ORAL at 06:15

## 2017-06-20 RX ADMIN — BUDESONIDE AND FORMOTEROL FUMARATE DIHYDRATE 2 PUFF: 160; 4.5 AEROSOL RESPIRATORY (INHALATION) at 17:03

## 2017-06-21 PROBLEM — D61.810 PANCYTOPENIA DUE TO CHEMOTHERAPY (HCC): Status: ACTIVE | Noted: 2017-05-17

## 2017-06-21 PROBLEM — R00.0 SINUS TACHYCARDIA: Status: ACTIVE | Noted: 2017-06-21

## 2017-06-21 PROCEDURE — 94760 N-INVAS EAR/PLS OXIMETRY 1: CPT

## 2017-06-21 RX ORDER — PANTOPRAZOLE SODIUM 40 MG/1
40 TABLET, DELAYED RELEASE ORAL
Status: DISCONTINUED | OUTPATIENT
Start: 2017-06-21 | End: 2017-06-23 | Stop reason: HOSPADM

## 2017-06-21 RX ORDER — MORPHINE SULFATE 15 MG/1
15 TABLET ORAL EVERY 4 HOURS PRN
Status: DISCONTINUED | OUTPATIENT
Start: 2017-06-21 | End: 2017-06-23 | Stop reason: HOSPADM

## 2017-06-21 RX ORDER — DILTIAZEM HYDROCHLORIDE 120 MG/1
120 CAPSULE, COATED, EXTENDED RELEASE ORAL DAILY
Status: DISCONTINUED | OUTPATIENT
Start: 2017-06-21 | End: 2017-06-23 | Stop reason: HOSPADM

## 2017-06-21 RX ORDER — AMOXICILLIN 250 MG
1 CAPSULE ORAL 2 TIMES DAILY
Status: DISCONTINUED | OUTPATIENT
Start: 2017-06-21 | End: 2017-06-23 | Stop reason: HOSPADM

## 2017-06-21 RX ORDER — MORPHINE SULFATE 15 MG/1
15 TABLET, FILM COATED, EXTENDED RELEASE ORAL EVERY 12 HOURS SCHEDULED
Status: DISCONTINUED | OUTPATIENT
Start: 2017-06-21 | End: 2017-06-22

## 2017-06-21 RX ORDER — ONDANSETRON 2 MG/ML
4 INJECTION INTRAMUSCULAR; INTRAVENOUS EVERY 4 HOURS PRN
Status: DISCONTINUED | OUTPATIENT
Start: 2017-06-21 | End: 2017-06-23 | Stop reason: HOSPADM

## 2017-06-21 RX ADMIN — Medication 800 UNITS: at 08:10

## 2017-06-21 RX ADMIN — ONDANSETRON 4 MG: 2 INJECTION INTRAMUSCULAR; INTRAVENOUS at 11:06

## 2017-06-21 RX ADMIN — OXYCODONE HYDROCHLORIDE 5 MG: 5 TABLET ORAL at 01:00

## 2017-06-21 RX ADMIN — MORPHINE SULFATE 15 MG: 15 TABLET ORAL at 15:31

## 2017-06-21 RX ADMIN — OXYCODONE HYDROCHLORIDE 5 MG: 5 TABLET ORAL at 09:56

## 2017-06-21 RX ADMIN — DILTIAZEM HYDROCHLORIDE 30 MG: 30 TABLET, FILM COATED ORAL at 05:20

## 2017-06-21 RX ADMIN — MORPHINE SULFATE 15 MG: 15 TABLET, EXTENDED RELEASE ORAL at 23:08

## 2017-06-21 RX ADMIN — BUDESONIDE AND FORMOTEROL FUMARATE DIHYDRATE 2 PUFF: 160; 4.5 AEROSOL RESPIRATORY (INHALATION) at 08:10

## 2017-06-21 RX ADMIN — DILTIAZEM HYDROCHLORIDE 120 MG: 120 CAPSULE, EXTENDED RELEASE ORAL at 09:56

## 2017-06-21 RX ADMIN — BUDESONIDE AND FORMOTEROL FUMARATE DIHYDRATE 2 PUFF: 160; 4.5 AEROSOL RESPIRATORY (INHALATION) at 17:25

## 2017-06-21 RX ADMIN — PREDNISONE 40 MG: 20 TABLET ORAL at 08:10

## 2017-06-21 RX ADMIN — SULFAMETHOXAZOLE AND TRIMETHOPRIM 1 TABLET: 400; 80 TABLET ORAL at 08:10

## 2017-06-21 RX ADMIN — MORPHINE SULFATE 15 MG: 15 TABLET, EXTENDED RELEASE ORAL at 11:06

## 2017-06-21 RX ADMIN — Medication 1 TABLET: at 17:26

## 2017-06-21 RX ADMIN — FERROUS SULFATE TAB 325 MG (65 MG ELEMENTAL FE) 325 MG: 325 (65 FE) TAB at 08:10

## 2017-06-21 RX ADMIN — DILTIAZEM HYDROCHLORIDE 30 MG: 30 TABLET, FILM COATED ORAL at 01:00

## 2017-06-21 RX ADMIN — OXYCODONE HYDROCHLORIDE 5 MG: 5 TABLET ORAL at 05:20

## 2017-06-21 RX ADMIN — Medication 1 TABLET: at 11:06

## 2017-06-21 RX ADMIN — PANTOPRAZOLE SODIUM 40 MG: 40 TABLET, DELAYED RELEASE ORAL at 11:06

## 2017-06-22 ENCOUNTER — APPOINTMENT (INPATIENT)
Dept: PHYSICAL THERAPY | Facility: HOSPITAL | Age: 67
DRG: 189 | End: 2017-06-22
Payer: COMMERCIAL

## 2017-06-22 PROBLEM — E43 SEVERE PROTEIN-CALORIE MALNUTRITION (HCC): Status: ACTIVE | Noted: 2017-06-22

## 2017-06-22 LAB
ALBUMIN SERPL BCP-MCNC: 1.6 G/DL (ref 3.5–5)
ALP SERPL-CCNC: 193 U/L (ref 46–116)
ALT SERPL W P-5'-P-CCNC: 32 U/L (ref 12–78)
ANION GAP SERPL CALCULATED.3IONS-SCNC: 6 MMOL/L (ref 4–13)
ANISOCYTOSIS BLD QL SMEAR: PRESENT
AST SERPL W P-5'-P-CCNC: 26 U/L (ref 5–45)
BASOPHILS # BLD MANUAL: 0 THOUSAND/UL (ref 0–0.1)
BASOPHILS NFR MAR MANUAL: 0 % (ref 0–1)
BILIRUB SERPL-MCNC: 0.53 MG/DL (ref 0.2–1)
BUN SERPL-MCNC: 42 MG/DL (ref 5–25)
CALCIUM SERPL-MCNC: 8.9 MG/DL (ref 8.3–10.1)
CHLORIDE SERPL-SCNC: 102 MMOL/L (ref 100–108)
CO2 SERPL-SCNC: 28 MMOL/L (ref 21–32)
CREAT SERPL-MCNC: 1.07 MG/DL (ref 0.6–1.3)
EOSINOPHIL # BLD MANUAL: 0.03 THOUSAND/UL (ref 0–0.4)
EOSINOPHIL NFR BLD MANUAL: 1 % (ref 0–6)
ERYTHROCYTE [DISTWIDTH] IN BLOOD BY AUTOMATED COUNT: 18.5 % (ref 11.6–15.1)
GFR SERPL CREATININE-BSD FRML MDRD: 51.5 ML/MIN/1.73SQ M
GLUCOSE SERPL-MCNC: 72 MG/DL (ref 65–140)
HCT VFR BLD AUTO: 26 % (ref 34.8–46.1)
HGB BLD-MCNC: 7.8 G/DL (ref 11.5–15.4)
LYMPHOCYTES # BLD AUTO: 0.17 THOUSAND/UL (ref 0.6–4.47)
LYMPHOCYTES # BLD AUTO: 5 % (ref 14–44)
MCH RBC QN AUTO: 28.7 PG (ref 26.8–34.3)
MCHC RBC AUTO-ENTMCNC: 30 G/DL (ref 31.4–37.4)
MCV RBC AUTO: 96 FL (ref 82–98)
MONOCYTES # BLD AUTO: 0.23 THOUSAND/UL (ref 0–1.22)
MONOCYTES NFR BLD: 7 % (ref 4–12)
NEUTROPHILS # BLD MANUAL: 2.87 THOUSAND/UL (ref 1.85–7.62)
NEUTS BAND NFR BLD MANUAL: 23 % (ref 0–8)
NEUTS SEG NFR BLD AUTO: 63 % (ref 43–75)
PLATELET # BLD AUTO: 51 THOUSANDS/UL (ref 149–390)
PLATELET BLD QL SMEAR: ABNORMAL
PMV BLD AUTO: 10.9 FL (ref 8.9–12.7)
POTASSIUM SERPL-SCNC: 4.8 MMOL/L (ref 3.5–5.3)
PROT SERPL-MCNC: 6.1 G/DL (ref 6.4–8.2)
RBC # BLD AUTO: 2.72 MILLION/UL (ref 3.81–5.12)
SODIUM SERPL-SCNC: 136 MMOL/L (ref 136–145)
TOTAL CELLS COUNTED SPEC: 100
VARIANT LYMPHS # BLD AUTO: 1 %
WBC # BLD AUTO: 3.34 THOUSAND/UL (ref 4.31–10.16)

## 2017-06-22 PROCEDURE — 94760 N-INVAS EAR/PLS OXIMETRY 1: CPT

## 2017-06-22 PROCEDURE — 97110 THERAPEUTIC EXERCISES: CPT

## 2017-06-22 PROCEDURE — 85007 BL SMEAR W/DIFF WBC COUNT: CPT | Performed by: INTERNAL MEDICINE

## 2017-06-22 PROCEDURE — 85027 COMPLETE CBC AUTOMATED: CPT | Performed by: INTERNAL MEDICINE

## 2017-06-22 PROCEDURE — 97530 THERAPEUTIC ACTIVITIES: CPT

## 2017-06-22 PROCEDURE — 80053 COMPREHEN METABOLIC PANEL: CPT | Performed by: INTERNAL MEDICINE

## 2017-06-22 PROCEDURE — 97116 GAIT TRAINING THERAPY: CPT

## 2017-06-22 RX ORDER — MORPHINE SULFATE 15 MG/1
15 TABLET, FILM COATED, EXTENDED RELEASE ORAL EVERY 12 HOURS SCHEDULED
Status: DISCONTINUED | OUTPATIENT
Start: 2017-06-22 | End: 2017-06-23 | Stop reason: HOSPADM

## 2017-06-22 RX ORDER — MELATONIN
1000 DAILY
Status: DISCONTINUED | OUTPATIENT
Start: 2017-06-23 | End: 2017-06-23 | Stop reason: HOSPADM

## 2017-06-22 RX ADMIN — Medication 800 UNITS: at 09:08

## 2017-06-22 RX ADMIN — MORPHINE SULFATE 15 MG: 15 TABLET ORAL at 12:54

## 2017-06-22 RX ADMIN — PREDNISONE 40 MG: 20 TABLET ORAL at 09:04

## 2017-06-22 RX ADMIN — Medication 1 TABLET: at 17:22

## 2017-06-22 RX ADMIN — BUDESONIDE AND FORMOTEROL FUMARATE DIHYDRATE 2 PUFF: 160; 4.5 AEROSOL RESPIRATORY (INHALATION) at 17:22

## 2017-06-22 RX ADMIN — MORPHINE SULFATE 15 MG: 15 TABLET, EXTENDED RELEASE ORAL at 10:12

## 2017-06-22 RX ADMIN — PANTOPRAZOLE SODIUM 40 MG: 40 TABLET, DELAYED RELEASE ORAL at 05:37

## 2017-06-22 RX ADMIN — DILTIAZEM HYDROCHLORIDE 120 MG: 120 CAPSULE, EXTENDED RELEASE ORAL at 09:05

## 2017-06-22 RX ADMIN — MORPHINE SULFATE 15 MG: 15 TABLET, EXTENDED RELEASE ORAL at 21:57

## 2017-06-22 RX ADMIN — SULFAMETHOXAZOLE AND TRIMETHOPRIM 1 TABLET: 400; 80 TABLET ORAL at 09:05

## 2017-06-22 RX ADMIN — FERROUS SULFATE TAB 325 MG (65 MG ELEMENTAL FE) 325 MG: 325 (65 FE) TAB at 09:04

## 2017-06-22 RX ADMIN — BUDESONIDE AND FORMOTEROL FUMARATE DIHYDRATE 2 PUFF: 160; 4.5 AEROSOL RESPIRATORY (INHALATION) at 09:04

## 2017-06-22 RX ADMIN — Medication 1 TABLET: at 09:04

## 2017-06-23 VITALS
HEART RATE: 104 BPM | WEIGHT: 125.66 LBS | TEMPERATURE: 97.2 F | SYSTOLIC BLOOD PRESSURE: 112 MMHG | OXYGEN SATURATION: 99 % | HEIGHT: 66 IN | BODY MASS INDEX: 20.2 KG/M2 | RESPIRATION RATE: 18 BRPM | DIASTOLIC BLOOD PRESSURE: 64 MMHG

## 2017-06-23 RX ORDER — IPRATROPIUM BROMIDE AND ALBUTEROL SULFATE 2.5; .5 MG/3ML; MG/3ML
3 SOLUTION RESPIRATORY (INHALATION) 3 TIMES DAILY PRN
Refills: 0
Start: 2017-06-23

## 2017-06-23 RX ORDER — BENZONATATE 100 MG/1
100 CAPSULE ORAL 3 TIMES DAILY PRN
Qty: 20 CAPSULE | Refills: 0
Start: 2017-06-23

## 2017-06-23 RX ORDER — PREDNISONE 10 MG/1
TABLET ORAL
Start: 2017-06-23

## 2017-06-23 RX ORDER — MORPHINE SULFATE 15 MG/1
15 TABLET ORAL EVERY 4 HOURS PRN
Qty: 10 TABLET | Refills: 0 | Status: SHIPPED | OUTPATIENT
Start: 2017-06-23

## 2017-06-23 RX ORDER — PANTOPRAZOLE SODIUM 40 MG/1
40 TABLET, DELAYED RELEASE ORAL
Refills: 0
Start: 2017-06-23

## 2017-06-23 RX ORDER — DILTIAZEM HYDROCHLORIDE 120 MG/1
120 CAPSULE, COATED, EXTENDED RELEASE ORAL DAILY
Refills: 0
Start: 2017-06-23

## 2017-06-23 RX ORDER — AMOXICILLIN 250 MG
1 CAPSULE ORAL 2 TIMES DAILY
Refills: 0
Start: 2017-06-23

## 2017-06-23 RX ORDER — MORPHINE SULFATE 15 MG/1
15 TABLET, FILM COATED, EXTENDED RELEASE ORAL EVERY 12 HOURS SCHEDULED
Qty: 10 TABLET | Refills: 0 | Status: SHIPPED | OUTPATIENT
Start: 2017-06-23

## 2017-06-23 RX ADMIN — Medication 1 TABLET: at 08:18

## 2017-06-23 RX ADMIN — PANTOPRAZOLE SODIUM 40 MG: 40 TABLET, DELAYED RELEASE ORAL at 05:26

## 2017-06-23 RX ADMIN — BUDESONIDE AND FORMOTEROL FUMARATE DIHYDRATE 2 PUFF: 160; 4.5 AEROSOL RESPIRATORY (INHALATION) at 18:04

## 2017-06-23 RX ADMIN — Medication 1 TABLET: at 18:04

## 2017-06-23 RX ADMIN — MORPHINE SULFATE 15 MG: 15 TABLET, EXTENDED RELEASE ORAL at 08:18

## 2017-06-23 RX ADMIN — FERROUS SULFATE TAB 325 MG (65 MG ELEMENTAL FE) 325 MG: 325 (65 FE) TAB at 08:18

## 2017-06-23 RX ADMIN — VITAMIN D, TAB 1000IU (100/BT) 1000 UNITS: 25 TAB at 11:26

## 2017-06-23 RX ADMIN — DILTIAZEM HYDROCHLORIDE 120 MG: 120 CAPSULE, EXTENDED RELEASE ORAL at 08:18

## 2017-06-23 RX ADMIN — BUDESONIDE AND FORMOTEROL FUMARATE DIHYDRATE 2 PUFF: 160; 4.5 AEROSOL RESPIRATORY (INHALATION) at 08:17

## 2017-06-23 RX ADMIN — SULFAMETHOXAZOLE AND TRIMETHOPRIM 1 TABLET: 400; 80 TABLET ORAL at 08:19

## 2017-06-23 RX ADMIN — PREDNISONE 40 MG: 20 TABLET ORAL at 08:18

## 2017-06-26 ENCOUNTER — GENERIC CONVERSION - ENCOUNTER (OUTPATIENT)
Dept: OTHER | Facility: OTHER | Age: 67
End: 2017-06-26

## 2017-07-06 DIAGNOSIS — J70.0 ACUTE PULMONARY MANIFESTATIONS DUE TO RADIATION (HCC): ICD-10-CM

## 2018-01-10 NOTE — CONSULTS
Chief Complaint  Evaluation regarding lung cancer  History of Present Illness  August 2016- had bronchitis  Didn't get better  CXR showed RLL abn  PET/CT showed 4 8 cm right lower lobe mass, SUV 18 7  Mediastinal lymph nodes showed no enlargement, SUVs up to 2 5  6 9 cm infrarenal abdominal aortic aneurysm noted  October 26, 2016- aneurysm repair  December 2016-repeat PET/CT showed left parotid hypermetabolism, unchanged  Right lower lobe mass, 5 4 cm, SUV 19 2  Mediastinal nodes not enlarged SUVs maximum 3 3  Biopsy of the right lower lobe mass shows squamous cell carcinoma  Review of Systems    Constitutional: No fever, no chills, feels well, no tiredness, no recent weight gain or weight loss  Eyes: No complaints of eye pain, no red eyes, no eyesight problems, no discharge, no dry eyes, no itching of eyes  ENT: no complaints of earache, no loss of hearing, no nose bleeds, no nasal discharge, no sore throat, no hoarseness  Cardiovascular: No complaints of slow heart rate, no fast heart rate, no chest pain, no palpitations, no leg claudication, no lower extremity edema  Respiratory: No complaints of shortness of breath, no wheezing, no cough, no SOB on exertion, no orthopnea, no PND  Gastrointestinal: No complaints of abdominal pain, no constipation, no nausea or vomiting, no diarrhea, no bloody stools  Genitourinary: No complaints of dysuria, no incontinence, no pelvic pain, no dysmenorrhea, no vaginal discharge or bleeding  Musculoskeletal: No complaints of arthralgias, no myalgias, no joint swelling or stiffness, no limb pain or swelling  Integumentary: No complaints of skin rash or lesions, no itching, no skin wounds, no breast pain or lump  Neurological: No complaints of headache, no confusion, no convulsions, no numbness, no dizziness or fainting, no tingling, no limb weakness, no difficulty walking     Psychiatric: Not suicidal, no sleep disturbance, no anxiety or depression, no change in personality, no emotional problems  Endocrine: No complaints of proptosis, no hot flashes, no muscle weakness, no deepening of the voice, no feelings of weakness  Hematologic/Lymphatic: No complaints of swollen glands, no swollen glands in the neck, does not bleed easily, does not bruise easily  Active Problems    1  Abdominal aortic aneurysm (441 4) (I71 4)   2  Abnormal findings on diagnostic imaging of other specified body structures (793 99)   (R93 8)   3  Arthritis (716 90) (M19 90)   4  Chronic bronchitis (491 9) (J42)   5  Chronic obstructive pulmonary disease, unspecified COPD type (496) (J44 9)   6  Encounter for routine gynecological examination (V72 31) (Z01 419)   7  Encounter for screening mammogram for malignant neoplasm of breast (V76 12)   (Z12 31)   8  Lung mass (786 6) (R91 8)   9  Osteopenia (733 90) (M85 80)   10  Preop cardiovascular exam (V72 81) (Z01 810)   11  Primary cancer of left lower lobe of lung (162 5) (C34 32)   12  Renal artery stenosis (440 1) (I70 1)    Past Medical History    · History of Asthma (493 90) (J45 909)   · History of Menopause (627 2)   · History of Pneumonia (V12 61)   · History of Previous Pregnancy - Vaginal Delivery   · History of Summary Of Previous Pregnancies  2  (Total No )   · History of Summary Of Previous Pregnancies Para 2  (Deliveries)    The active problems and past medical history were reviewed and updated today  Surgical History    · History of Bronchoscopy (Therap) W/ EBUS Guid Transendoscopic, For Periph Lesions   · History of Endovascular Repair Of Abdominal Aorta Aneurysm   · History of Tubal Ligation    The surgical history was reviewed and updated today         Family History    · Family history of Brain Cancer (V16 8)   · Family history of Lung Cancer (V16 1)    · Family history of Arthritis (V17 7)   · Family history of abdominal aortic aneurysm (AAA) (V17 49) (Z82 49)   · Family history of Renal Failure    The family history was reviewed and updated today  Social History    · Being A Social Drinker   · Former smoker (N67 86) (N16 921)   · Quit March 2016  Diaz Castro Has a smoking history of 42 years  Diaz Castro 2 packs of cigarettes a day  · Marital History - Currently    · No drug use  The social history was reviewed and updated today  Current Meds   1  Calcium + D TABS; TAKE 1 TABLET DAILY; Therapy: (Recorded:07Oct2016) to Recorded   2  Coconut Oil 1000 MG Oral Capsule; TAKE CAPSULE Daily; Therapy: (Recorded:07Oct2016) to Recorded   3  [employer] Vitamin D CAPS; TAKE CAPSULE Daily; Therapy: (Recorded:07Oct2016) to Recorded   4  Flaxseed Oil 1200 MG Oral Capsule; TAKE AS DIRECTED; Therapy: (Recorded:07Oct2016) to Recorded   5  Krill Oil CAPS Recorded   6  Ocuvite TABS; take 2 tablet daily; Therapy: (Recorded:07Oct2016) to Recorded   7  One-Daily Multi Vitamins TABS; TAKE 1 TABLET DAILY; Therapy: (Recorded:07Oct2016) to Recorded   8  ProAir  (90 Base) MCG/ACT Inhalation Aerosol Solution; INHALE 1 TO 2 PUFFS   EVERY 4 TO 6 HOURS AS NEEDED Recorded   9  Symbicort 160-4 5 MCG/ACT Inhalation Aerosol; INHALE 2 PUFFS TWICE DAILY  RINSE   MOUTH AFTER USE Recorded   10  Turmeric 450 MG Oral Capsule; TAKE CAPSULE Daily; Therapy: (Recorded:07Oct2016) to Recorded   11  ZyrTEC Allergy 10 MG Oral Tablet; Therapy: (Recorded:22Nov2013) to Recorded    The medication list was reviewed and updated today  Allergies    1  Codeine Derivatives    2  Seasonal    Vitals   Recorded: 69Yox2567 10:59AM   Temperature 98 6 F   Heart Rate 75   Respiration 16   Systolic 586   Diastolic 62   Height 5 ft 6 in   Weight 153 lb 3 04 oz   BMI Calculated 24 73   BSA Calculated 1 79   O2 Saturation 97   Pain Scale 0     Physical Exam    Constitutional   General appearance: No acute distress, well appearing and well nourished  Eyes   Conjunctiva and lids: No swelling, erythema or discharge      Ears, Nose, Mouth, and Throat   External inspection of ears and nose: Normal     Oropharynx: Normal with no erythema, edema, exudate or lesions  Pulmonary   Auscultation of lungs: Clear to auscultation  Cardiovascular   Auscultation of heart: Normal rate and rhythm, normal S1 and S2, without murmurs  Examination of extremities for edema and/or varicosities: Normal     Abdomen   Abdomen: Non-tender, no masses  Liver and spleen: No hepatomegaly or splenomegaly  Lymphatic   Palpation of lymph nodes in neck: No lymphadenopathy  Musculoskeletal   Gait and station: Normal     Skin   Skin and subcutaneous tissue: Normal without rashes or lesions  Neurologic   Cranial nerves: Cranial nerves 2-12 intact  Psychiatric   Orientation to person, place, and time: Normal          Assessment    1  Squamous cell carcinoma lung (162 9) (C34 90)    Plan  Squamous cell carcinoma lung    · Ondansetron HCl - 8 MG Oral Tablet (Zofran); One tab PO every 8 hours as needed  for nausea   Rx By: Alka Osorio; Dispense: 0 Days ; #:30 Tablet; Refill: 3; For: Squamous cell carcinoma lung; ROMAIN = N; Verified Transmission to [facility] #[id number]; Last Updated By: System, IKO System; 12/20/2016 11:51:26 AM   · Drink plenty of fluids ; Status:Complete;   Done: 54DVF8129   Ordered; For:Squamous cell carcinoma lung; Ordered By:Caro Robert;   · Follow-up visit in 3 weeks Evaluation and Treatment  Follow-up  Status: Hold For -  Scheduling  Requested for: 07Lyn0085   Ordered; For: Squamous cell carcinoma lung; Ordered By: Alka Osorio Performed:  Due: 81SIS9575   · (1) CBC/PLT/DIFF; Status:Active; Requested VWE:[id number];    Perform:[facility]; ISQ:72WBD9313; Last Updated By:Maria Alejandra Gee; 12/20/2016 12:08:00 PM;Ordered; For:Squamous cell carcinoma lung; Ordered By:Caro Robert;   · (1) CBC/PLT/DIFF; Status:Active; Requested VMW:49WOJ3707;    Perform:[facility]; [place];  Last Updated By:Maria Alejandra Gee; 12/20/2016 12:08:08 PM;Ordered; For:Squamous cell carcinoma lung; Ordered By:Veena Robert;   · (1) CBC/PLT/DIFF; Status:Active; Requested for:94Clt8689;    Perform:[facility]; PJM:97RMW7269;EUZDIMK; For:Squamous cell carcinoma lung; Ordered By:Veena Robert;   · (1) CBC/PLT/DIFF; Status:Active; Requested for:15Kpw9214;    Perform:[facility]; Due:02Nyt3596; Last Updated By:Reymundo Gee; 12/20/2016 12:07:50 PM;Ordered; For:Squamous cell carcinoma lung; Ordered By:Williams Robert;   · (1) CBC/PLT/DIFF; Status: In Progress - Order Generated; Requested for:Recurring  Schedule: 12/20/2016; 12/27/2016; 1/3/2017; 1/10/2017; 1/17/2017; 1/24/2017;  1/31/2017; 2/   ;    Perform:[facility]; ROK:91CIH6274;MCYWVJK; For:Squamous cell carcinoma lung; Ordered By:Veena Robert;   · (1) COMPREHENSIVE METABOLIC PANEL; Status:Active; Requested BQJ:[id number];    Perform:[facility]; LJB:49UML6236; Last Updated By:Reymundo Gee; 12/20/2016 12:08:00 PM;Ordered; For:Squamous cell carcinoma lung; Ordered By:Veena Robert;   · (1) COMPREHENSIVE METABOLIC PANEL; Status:Active; Requested OQH:[id number];    Perform:[facility]; [place]; Last Updated By:Reymundo Gee; 12/20/2016 12:08:08 PM;Ordered; For:Squamous cell carcinoma lung; Ordered By:Veena Robert;   · (1) COMPREHENSIVE METABOLIC PANEL; Status:Active; Requested for:05Csd1013;    Perform:[facility]; LQV:70GKB6293;TWRKUFG; For:Squamous cell carcinoma lung; Ordered By:Veena Robert;   · (1) COMPREHENSIVE METABOLIC PANEL; Status:Active; Requested for:52Rff3321;    Perform:[facility]; Due:59Byz9188; Last Updated By:Reymundo Gee; 12/20/2016 12:07:50 PM;Ordered; For:Squamous cell carcinoma lung; Ordered By:Williams Robert;   · (1) COMPREHENSIVE METABOLIC PANEL; Status: In Progress - Order Generated; Requested for:Recurring Schedule: 12/20/2016; 12/27/2016; 1/3/2017; 1/10/2017;  1/17/2017; 1/24/2017; 1/31/2017; 2/   ; Perform:[facility]; ABV:05ZWN7155;FTLJREX; For:Squamous cell carcinoma lung; Ordered By:Williams Robert;    Discussion/Summary    In summary, this is a 29-year-old female history of recently diagnosed squamous cell carcinoma  The right lung as outlined  Borderline hilar disease is noted  Pulmonary function and metabolic testing are equivocal  Surgical consideration indicated that a high likelihood for postoperative morbidity given  Bilateral upper lobe emphysema and a lower lobe tumor  Radiation therapy with concomitant sensitization and follow-up chemotherapy are considered as an alternative, therefore  We reviewed a chemotherapy plan using weekly Taxol and carboplatin  Concomitantly with radiation therapy followed by consolidative chemotherapy for 2 cycles, provided an improvement is noted  We reviewed potential toxicities of this program including but not limited to, nausea, vomiting, alopecia, fatigue, cytopenias, peripheral neuropathy, constipation, allergic reactions  We reviewed prophylactic measures taken routinely as well as monitoring to allow for early intervention is appropriate  Our chemotherapy nurse review the above information as well as given her written information in this regard  The patient voiced understanding and agreement with the above plan and will be proceeding as outlined  We made arrangements accordingly  The patient was counseled regarding diagnostic results, instructions for management, prognosis, patient and family education, impressions, risks and benefits of treatment options  total time of encounter was 40 minutes        Signatures   Electronically signed by : ALEJANDRA Weaver DOM D ,DO; Dec 20 2016  4:53PM EST                       (Author)

## 2018-01-11 NOTE — MISCELLANEOUS
Message  patient had infusion reaction to taxol today - Patient got about 10 minutes of drug  Patient had lower back pain, dizziness  Hypersensitivity protocol initiated  Per Dr Alie Russell D/C treatment at this time  I will discuss further with Dr Charlene Goodman when he returns next week  Active Problems    1  Abdominal aortic aneurysm (441 4) (I71 4)   2  Abnormal findings on diagnostic imaging of other specified body structures (793 99)   (R93 8)   3  Arthritis (716 90) (M19 90)   4  Chronic bronchitis (491 9) (J42)   5  Chronic obstructive pulmonary disease, unspecified COPD type (496) (J44 9)   6  Encounter for routine gynecological examination (V72 31) (Z01 419)   7  Encounter for screening mammogram for malignant neoplasm of breast (V76 12)   (Z12 31)   8  Lung mass (786 6) (R91 8)   9  Osteopenia (733 90) (M85 80)   10  Preop cardiovascular exam (V72 81) (Z01 810)   11  Primary cancer of left lower lobe of lung (162 5) (C34 32)   12  Renal artery stenosis (440 1) (I70 1)   13  Squamous cell carcinoma lung (162 9) (C34 90)    Current Meds   1  Calcium + D TABS; TAKE 1 TABLET DAILY; Therapy: (Recorded:07Oct2016) to Recorded   2  Coconut Oil 1000 MG Oral Capsule; TAKE CAPSULE Daily; Therapy: (Recorded:07Oct2016) to Recorded   3  [employer] Vitamin D CAPS; TAKE CAPSULE Daily; Therapy: (Recorded:07Oct2016) to Recorded   4  Flaxseed Oil 1200 MG Oral Capsule; TAKE AS DIRECTED; Therapy: (Recorded:07Oct2016) to Recorded   5  Krill Oil CAPS Recorded   6  Ocuvite TABS; take 2 tablet daily; Therapy: (Recorded:07Oct2016) to Recorded   7  Ondansetron HCl - 8 MG Oral Tablet (Zofran); One tab PO every 8 hours as needed for   nausea; Therapy: 24Lvh1559 to (Last Rx:74Ebb2082)  Requested for: 01Cjj6576 Ordered   8  One-Daily Multi Vitamins TABS; TAKE 1 TABLET DAILY; Therapy: (Recorded:24Ekk7536) to Recorded   9   ProAir  (90 Base) MCG/ACT Inhalation Aerosol Solution; INHALE 1 TO 2 PUFFS   EVERY 4 TO 6 HOURS AS NEEDED Recorded   10  Symbicort 160-4 5 MCG/ACT Inhalation Aerosol; INHALE 2 PUFFS TWICE DAILY  RINSE MOUTH AFTER USE Recorded   11  Turmeric 450 MG Oral Capsule; TAKE CAPSULE Daily; Therapy: (Recorded:07Oct2016) to Recorded   12  ZyrTEC Allergy 10 MG Oral Tablet; Therapy: (Recorded:22Nov2013) to Recorded    Allergies    1  Codeine Derivatives    2   Seasonal    Signatures   Electronically signed by : Jacy Reinoso, ; Dec 28 2016 11:15AM EST                       (Author)

## 2018-01-11 NOTE — MISCELLANEOUS
Message   Recorded as Task   Date: 04/17/2017 11:19 AM, Created By: Rasheeda Sims   Task Name: Follow Up   Assigned To: Alena Rivera   Regarding Patient: Chepe Verduzco, Status: Active   Comment:    GeeSue - 17 Apr 2017 11:19 AM     TASK CREATED  Caller: Self; General Medical Question; [phone] (Home)  Recently discharged from the hospital and wants to know when she should restart chemo  Had to cancel last week's infusion because she was hospitalized  Please call back  Spoke with patient today - She was d/c'd from the hospital on Thursday  Missed chemo scheduled for 4/12/17  Patient requesting to wait to start chemo until 5/3  follow up with Dr Albertina Walker on 5/2 - she will call with any additional questions or concerns      Active Problems    1  Abdominal aortic aneurysm (441 4) (I71 4)   2  Abnormal findings on diagnostic imaging of other specified body structures (793 99)   (R93 8)   3  Allergic rhinitis (477 9) (J30 9)   4  Anemia (285 9) (D64 9)   5  Anemia due to antineoplastic chemotherapy (285 3,E933 1) (D64 81)   6  Arthritis (716 90) (M19 90)   7  Chronic bronchitis (491 9) (J42)   8  COPD (chronic obstructive pulmonary disease) (496) (J44 9)   9  Encounter for routine gynecological examination (V72 31) (Z01 419)   10  Encounter for screening mammogram for malignant neoplasm of breast (V76 12)    (Z12 31)   11  Lung mass (786 6) (R91 8)   12  Malignant neoplasm of lower lobe bronchus, right (162 5) (C34 31)   13  Osteopenia (733 90) (M85 80)   14  Preop cardiovascular exam (V72 81) (Z01 810)   15  Renal artery stenosis (440 1) (I70 1)   16  Squamous cell carcinoma lung (162 9) (C34 90)    Current Meds   1  Calcium + D TABS; TAKE 1 TABLET DAILY; Therapy: (Recorded:07Oct2016) to Recorded   2  Coconut Oil 1000 MG Oral Capsule; TAKE CAPSULE Daily; Therapy: (Recorded:07Oct2016) to Recorded   3  [employer]S Vitamin D CAPS; TAKE CAPSULE Daily; Therapy: (Recorded:07Oct2016) to Recorded   4   Flaxseed Oil 1200 MG Oral Capsule; TAKE AS DIRECTED; Therapy: (Recorded:07Oct2016) to Recorded   5  Fluticasone Propionate 50 MCG/ACT Nasal Suspension; USE 2 SPRAYS IN EACH   NOSTRIL ONCE DAILY; Therapy: 30VXD2331 to (Last Rx:02Mar2017)  Requested for: 20ICJ5389 Ordered   6  Krill Oil CAPS Recorded   7  Ocuvite TABS; take 2 tablet daily; Therapy: (Recorded:07Oct2016) to Recorded   8  One-Daily Multi Vitamins TABS; TAKE 1 TABLET DAILY; Therapy: (Recorded:07Oct2016) to Recorded   9  ProAir  (90 Base) MCG/ACT Inhalation Aerosol Solution; INHALE 1 TO 2 PUFFS   EVERY 4 TO 6 HOURS AS NEEDED Recorded   10  Symbicort 160-4 5 MCG/ACT Inhalation Aerosol; INHALE TWO PUFFS BY MOUTH    TWICE DAILY  RINSE MOUTH AFTER USE  Requested for: 97PGI8886; Last    Rx:09Mar2017 Ordered   11  Turmeric 450 MG Oral Capsule; TAKE CAPSULE Daily; Therapy: (Recorded:07Oct2016) to Recorded   12  Zegerid  MG Oral Capsule (Omeprazole-Sodium Bicarbonate); Therapy: (QAUAMIYR:41LOW8784) to Recorded   13  ZyrTEC Allergy 10 MG Oral Tablet; Therapy: (Recorded:22Nov2013) to Recorded    Allergies    1  Codeine Derivatives   2  Taxol CONC    3   Seasonal    Signatures   Electronically signed by : Karin Floyd, ; Apr 17 2017 12:48PM EST                       (Author)

## 2018-01-11 NOTE — MISCELLANEOUS
Message   Date: 12 Aug 2016 4:04 PM EST, Recorded By: Saumya Goel   Calling For: Johnathanhivu Sos: Damaso Cote, Other   Phone: [phone]   Reason: Authorization Status   Spoke with Damaso Cote w/[facility]   No PreCert required for CPT 73532   But there is a pre-determination clause  I have faxed previous testing results and notes to FAX# [phone]//Ref#[id number]///Anabella///        Active Problems    1  Arthritis (716 90) (M19 90)   2  Chronic bronchitis (491 9) (J42)   3  Encounter for routine gynecological examination (V72 31) (Z01 419)   4  Encounter for screening mammogram for malignant neoplasm of breast (V76 12)   (Z12 31)   5  Osteopenia (733 90) (M85 80)   6  Symptomatic menopausal or female climacteric states (627 2) (N95 1)    Current Meds   1  ZyrTEC Allergy 10 MG Oral Tablet; Therapy: (Recorded:22Nov2013) to Recorded    Allergies    1  Codeine Derivatives    2   Seasonal    Signatures   Electronically signed by : Tj Styles, ; Aug 12 2016  4:07PM EST                       (Author)

## 2018-01-11 NOTE — MISCELLANEOUS
Message  Discussed the results of the CAT scan with the patient  She has a 7 cm AAA  We will discuss her case in the vascular case conference coming Monday to decide on open versus endovascular options  We are planning to repair this within the next 2-3 weeks due to the size of this aneurysm  She will undergo a bronchoscopy with Dr Danitza Barger next week  She will also be undergoing cardiac evaluation tomorrow  Signatures   Electronically signed by :  Maria Teresa Oleary MD; Oct  5 2016  5:37PM EST                       (Author)

## 2018-01-12 VITALS
BODY MASS INDEX: 20.88 KG/M2 | RESPIRATION RATE: 16 BRPM | SYSTOLIC BLOOD PRESSURE: 100 MMHG | TEMPERATURE: 101.5 F | HEIGHT: 67 IN | OXYGEN SATURATION: 97 % | WEIGHT: 133 LBS | DIASTOLIC BLOOD PRESSURE: 60 MMHG | HEART RATE: 125 BPM

## 2018-01-12 VITALS
WEIGHT: 130.8 LBS | HEIGHT: 67 IN | SYSTOLIC BLOOD PRESSURE: 128 MMHG | BODY MASS INDEX: 20.53 KG/M2 | RESPIRATION RATE: 16 BRPM | DIASTOLIC BLOOD PRESSURE: 64 MMHG | TEMPERATURE: 97.3 F | HEART RATE: 126 BPM | OXYGEN SATURATION: 95 %

## 2018-01-13 VITALS
RESPIRATION RATE: 16 BRPM | SYSTOLIC BLOOD PRESSURE: 122 MMHG | BODY MASS INDEX: 24.61 KG/M2 | HEIGHT: 66 IN | TEMPERATURE: 97.7 F | OXYGEN SATURATION: 98 % | HEART RATE: 86 BPM | DIASTOLIC BLOOD PRESSURE: 82 MMHG | WEIGHT: 153.13 LBS

## 2018-01-13 VITALS
SYSTOLIC BLOOD PRESSURE: 114 MMHG | WEIGHT: 132 LBS | DIASTOLIC BLOOD PRESSURE: 66 MMHG | BODY MASS INDEX: 21.21 KG/M2 | TEMPERATURE: 98.3 F | HEART RATE: 113 BPM | HEIGHT: 66 IN | OXYGEN SATURATION: 92 % | RESPIRATION RATE: 16 BRPM

## 2018-01-13 VITALS
DIASTOLIC BLOOD PRESSURE: 62 MMHG | TEMPERATURE: 97.7 F | HEIGHT: 66 IN | BODY MASS INDEX: 25.88 KG/M2 | WEIGHT: 161 LBS | SYSTOLIC BLOOD PRESSURE: 132 MMHG | OXYGEN SATURATION: 99 % | HEART RATE: 107 BPM | RESPIRATION RATE: 16 BRPM

## 2018-01-13 VITALS
HEIGHT: 67 IN | WEIGHT: 135 LBS | RESPIRATION RATE: 15 BRPM | SYSTOLIC BLOOD PRESSURE: 118 MMHG | DIASTOLIC BLOOD PRESSURE: 64 MMHG | BODY MASS INDEX: 21.19 KG/M2 | HEART RATE: 64 BPM | TEMPERATURE: 97 F | OXYGEN SATURATION: 97 %

## 2018-01-13 VITALS
RESPIRATION RATE: 16 BRPM | TEMPERATURE: 97.6 F | SYSTOLIC BLOOD PRESSURE: 134 MMHG | BODY MASS INDEX: 21.44 KG/M2 | DIASTOLIC BLOOD PRESSURE: 60 MMHG | OXYGEN SATURATION: 91 % | HEART RATE: 94 BPM | HEIGHT: 66 IN | WEIGHT: 133.38 LBS

## 2018-01-13 VITALS
DIASTOLIC BLOOD PRESSURE: 58 MMHG | RESPIRATION RATE: 16 BRPM | HEART RATE: 110 BPM | WEIGHT: 149 LBS | TEMPERATURE: 97.3 F | OXYGEN SATURATION: 95 % | BODY MASS INDEX: 23.95 KG/M2 | SYSTOLIC BLOOD PRESSURE: 120 MMHG | HEIGHT: 66 IN

## 2018-01-14 VITALS
HEIGHT: 67 IN | WEIGHT: 134.38 LBS | BODY MASS INDEX: 21.09 KG/M2 | SYSTOLIC BLOOD PRESSURE: 122 MMHG | DIASTOLIC BLOOD PRESSURE: 64 MMHG

## 2018-01-14 VITALS
OXYGEN SATURATION: 90 % | TEMPERATURE: 99.2 F | DIASTOLIC BLOOD PRESSURE: 52 MMHG | WEIGHT: 138.2 LBS | RESPIRATION RATE: 16 BRPM | BODY MASS INDEX: 22.21 KG/M2 | HEART RATE: 78 BPM | HEIGHT: 66 IN | SYSTOLIC BLOOD PRESSURE: 120 MMHG

## 2018-01-14 VITALS
HEART RATE: 102 BPM | DIASTOLIC BLOOD PRESSURE: 50 MMHG | TEMPERATURE: 100.4 F | OXYGEN SATURATION: 93 % | HEIGHT: 66 IN | WEIGHT: 140 LBS | RESPIRATION RATE: 16 BRPM | SYSTOLIC BLOOD PRESSURE: 98 MMHG | BODY MASS INDEX: 22.5 KG/M2

## 2018-01-14 VITALS
SYSTOLIC BLOOD PRESSURE: 124 MMHG | HEIGHT: 67 IN | TEMPERATURE: 101.4 F | BODY MASS INDEX: 20.72 KG/M2 | WEIGHT: 132 LBS | DIASTOLIC BLOOD PRESSURE: 70 MMHG | OXYGEN SATURATION: 91 % | HEART RATE: 54 BPM

## 2018-01-14 NOTE — CONSULTS
Assessment    1  Squamous cell carcinoma lung (162 9) (C34 90)    Plan  Squamous cell carcinoma lung    · Ondansetron HCl - 8 MG Oral Tablet (Zofran); One tab PO every 8 hours as needed  for nausea   Rx By: Chase Gudino; Dispense: 0 Days ; #:30 Tablet; Refill: 3; For: Squamous cell carcinoma lung; ROMAIN = N; Verified Transmission to [facility] #[id number]; Last Updated By: System, [employer]; 2016 11:51:26 AM   · Drink plenty of fluids ; Status:Complete;   Done: 60XFE3942   Ordered; For:Squamous cell carcinoma lung; Ordered By:Shelia Robert;   · Follow-up visit in 3 weeks Evaluation and Treatment  Follow-up  Status: Hold For -  Scheduling  Requested for: 10Gxd9939   Ordered; For: Squamous cell carcinoma lung; Ordered By: Chase Gudino Performed:  Due: 74TKT4537   · (1) CBC/PLT/DIFF; Status:Active; Requested SGW:18KQZ3621;    Perform:[facility]; AXEL:[id number]; Last Updated By:Axel Gee; 2016 12:08:00 PM;Ordered; For:Squamous cell carcinoma lung; Ordered By:Shelia Robert;   · (1) CBC/PLT/DIFF; Status:Active; Requested [id number];    Perform:[facility]; [place]; Last Updated By:Axel Gee; 2016 12:08:08 PM;Ordered; For:Squamous cell carcinoma lung; Ordered By:Shelia Robert;   · (1) CBC/PLT/DIFF; Status:Active; Requested for:35Cvq5774;    Perform:[facility]; IAT:69OID2581;AITTIGQ; For:Squamous cell carcinoma lung; Ordered By:Shelia Robert;   · (1) CBC/PLT/DIFF; Status:Active; Requested for:22Dhx0321;    Perform:[facility]; Due:66Upx8017; Last Updated By:Axel Gee; 2016 12:07:50 PM;Ordered; For:Squamous cell carcinoma lung; Ordered By:Williams Robert;   · (1) CBC/PLT/DIFF; Status: In Progress - Order Generated; Requested for:Recurring  Schedule: 2016; 2016; 1/3/2017; 1/10/2017; 2017; 2017;  2017; 2/   ;    Perform:[facility]; CCP:[id number];EDYIFAI;   For:Squamous cell carcinoma lung; Ordered By:Domonique Robert;   · (1) COMPREHENSIVE METABOLIC PANEL; Status:Active; Requested FPR:[id number];    Perform:[facility]; DNY:32GTC8390; Last Updated By:Ari Gee; 12/20/2016 12:08:00 PM;Ordered; For:Squamous cell carcinoma lung; Ordered By:Domonique Robert;   · (1) COMPREHENSIVE METABOLIC PANEL; Status:Active; Requested FREDDY:[id number];    Perform:[facility]; [place]; Last Updated By:Ari Gee; 12/20/2016 12:08:08 PM;Ordered; For:Squamous cell carcinoma lung; Ordered By:Domonique Robert;   · (1) COMPREHENSIVE METABOLIC PANEL; Status:Active; Requested for:52Qry9063;    Perform:[facility]; ZMV:39LMI6324;KIWACEX; For:Squamous cell carcinoma lung; Ordered By:Domonique Robert;   · (1) COMPREHENSIVE METABOLIC PANEL; Status:Active; Requested for:50Xeu0085;    Perform:[facility]; Due:40Slk2538; Last Updated By:Ari Gee; 12/20/2016 12:07:50 PM;Ordered; For:Squamous cell carcinoma lung; Ordered By:Williams Robert;   · (1) COMPREHENSIVE METABOLIC PANEL; Status: In Progress - Order Generated; Requested for:Recurring Schedule: 12/20/2016; 12/27/2016; 1/3/2017; 1/10/2017;  1/17/2017; 1/24/2017; 1/31/2017; 2/   ;    Perform:[facility]; ZTB:74KCO0397;ZVBMYGZ; For:Squamous cell carcinoma lung; Ordered By:Williams Robert;    Discussion/Summary    In summary, this is a 72-year-old female history of recently diagnosed squamous cell carcinoma  The right lung as outlined  Borderline hilar disease is noted  Pulmonary function and metabolic testing are equivocal  Surgical consideration indicated that a high likelihood for postoperative morbidity given  Bilateral upper lobe emphysema and a lower lobe tumor  Radiation therapy with concomitant sensitization and follow-up chemotherapy are considered as an alternative, therefore  We reviewed a chemotherapy plan using weekly Taxol and carboplatin   Concomitantly with radiation therapy followed by consolidative chemotherapy for 2 cycles, provided an improvement is noted  We reviewed potential toxicities of this program including but not limited to, nausea, vomiting, alopecia, fatigue, cytopenias, peripheral neuropathy, constipation, allergic reactions  We reviewed prophylactic measures taken routinely as well as monitoring to allow for early intervention is appropriate  Our chemotherapy nurse review the above information as well as given her written information in this regard  The patient voiced understanding and agreement with the above plan and will be proceeding as outlined  We made arrangements accordingly  The patient was counseled regarding diagnostic results, instructions for management, prognosis, patient and family education, impressions, risks and benefits of treatment options  total time of encounter was 40 minutes  Chief Complaint  Evaluation regarding lung cancer  History of Present Illness  August 2016- had bronchitis  Didn't get better  CXR showed RLL abn  PET/CT showed 4 8 cm right lower lobe mass, SUV 18 7  Mediastinal lymph nodes showed no enlargement, SUVs up to 2 5  6 9 cm infrarenal abdominal aortic aneurysm noted  October 26, 2016- aneurysm repair  December 2016-repeat PET/CT showed left parotid hypermetabolism, unchanged  Right lower lobe mass, 5 4 cm, SUV 19 2  Mediastinal nodes not enlarged SUVs maximum 3 3  Biopsy of the right lower lobe mass shows squamous cell carcinoma  Review of Systems    Constitutional: No fever, no chills, feels well, no tiredness, no recent weight gain or weight loss  Eyes: No complaints of eye pain, no red eyes, no eyesight problems, no discharge, no dry eyes, no itching of eyes  ENT: no complaints of earache, no loss of hearing, no nose bleeds, no nasal discharge, no sore throat, no hoarseness     Cardiovascular: No complaints of slow heart rate, no fast heart rate, no chest pain, no palpitations, no leg claudication, no lower extremity edema  Respiratory: No complaints of shortness of breath, no wheezing, no cough, no SOB on exertion, no orthopnea, no PND  Gastrointestinal: No complaints of abdominal pain, no constipation, no nausea or vomiting, no diarrhea, no bloody stools  Genitourinary: No complaints of dysuria, no incontinence, no pelvic pain, no dysmenorrhea, no vaginal discharge or bleeding  Musculoskeletal: No complaints of arthralgias, no myalgias, no joint swelling or stiffness, no limb pain or swelling  Integumentary: No complaints of skin rash or lesions, no itching, no skin wounds, no breast pain or lump  Neurological: No complaints of headache, no confusion, no convulsions, no numbness, no dizziness or fainting, no tingling, no limb weakness, no difficulty walking  Psychiatric: Not suicidal, no sleep disturbance, no anxiety or depression, no change in personality, no emotional problems  Endocrine: No complaints of proptosis, no hot flashes, no muscle weakness, no deepening of the voice, no feelings of weakness  Hematologic/Lymphatic: No complaints of swollen glands, no swollen glands in the neck, does not bleed easily, does not bruise easily  Active Problems    1  Abdominal aortic aneurysm (441 4) (I71 4)   2  Abnormal findings on diagnostic imaging of other specified body structures (793 99)   (R93 8)   3  Arthritis (716 90) (M19 90)   4  Chronic bronchitis (491 9) (J42)   5  Chronic obstructive pulmonary disease, unspecified COPD type (496) (J44 9)   6  Encounter for routine gynecological examination (V72 31) (Z01 419)   7  Encounter for screening mammogram for malignant neoplasm of breast (V76 12)   (Z12 31)   8  Lung mass (786 6) (R91 8)   9  Osteopenia (733 90) (M85 80)   10  Preop cardiovascular exam (V72 81) (Z01 810)   11  Primary cancer of left lower lobe of lung (162 5) (C34 32)   12  Renal artery stenosis (440 1) (I70 1)    Past Medical History    1  History of Asthma (493 90) (J45 909)   2   History of Menopause (627 2)   3  History of Pneumonia (V12 61)   4  History of Previous Pregnancy - Vaginal Delivery   5  History of Summary Of Previous Pregnancies  2  (Total No )   6  History of Summary Of Previous Pregnancies Para 2  (Deliveries)    The active problems and past medical history were reviewed and updated today  Surgical History    1  History of Bronchoscopy (Therap) W/ EBUS Guid Transendoscopic, For Periph Lesions   2  History of Endovascular Repair Of Abdominal Aorta Aneurysm   3  History of Tubal Ligation    The surgical history was reviewed and updated today  Family History  Mother    1  Family history of Brain Cancer (V16 8)   2  Family history of Lung Cancer (V16 1)  Father    3  Family history of Arthritis (V17 7)   4  Family history of abdominal aortic aneurysm (AAA) (V17 49) (Z82 49)   5  Family history of Renal Failure    The family history was reviewed and updated today  Social History    · Being A Social Drinker   · Former smoker (A17 42) (J59 068)   · Marital History - Currently    · No drug use  The social history was reviewed and updated today  Current Meds   1  Calcium + D TABS; TAKE 1 TABLET DAILY; Therapy: (Recorded:2016) to Recorded   2  Coconut Oil 1000 MG Oral Capsule; TAKE CAPSULE Daily; Therapy: (Recorded:2016) to Recorded   3  [employer] Vitamin D CAPS; TAKE CAPSULE Daily; Therapy: (Recorded:2016) to Recorded   4  Flaxseed Oil 1200 MG Oral Capsule; TAKE AS DIRECTED; Therapy: (Recorded:2016) to Recorded   5  Krill Oil CAPS Recorded   6  Ocuvite TABS; take 2 tablet daily; Therapy: (Recorded:2016) to Recorded   7  One-Daily Multi Vitamins TABS; TAKE 1 TABLET DAILY; Therapy: (Recorded:2016) to Recorded   8  ProAir  (90 Base) MCG/ACT Inhalation Aerosol Solution; INHALE 1 TO 2 PUFFS   EVERY 4 TO 6 HOURS AS NEEDED Recorded   9  Symbicort 160-4 5 MCG/ACT Inhalation Aerosol; INHALE 2 PUFFS TWICE DAILY   RINSE MOUTH AFTER USE Recorded   10  Turmeric 450 MG Oral Capsule; TAKE CAPSULE Daily; Therapy: (Recorded:07Oct2016) to Recorded   11  ZyrTEC Allergy 10 MG Oral Tablet; Therapy: (Recorded:22Nov2013) to Recorded    The medication list was reviewed and updated today  Allergies    1  Codeine Derivatives    2  Seasonal    Vitals  Vital Signs    Recorded: 09Lel1777 10:59AM   Temperature 98 6 F   Heart Rate 75   Respiration 16   Systolic 927   Diastolic 62   Height 5 ft 6 in   Weight 153 lb 3 04 oz   BMI Calculated 24 73   BSA Calculated 1 79   O2 Saturation 97   Pain Scale 0     Physical Exam    Constitutional   General appearance: No acute distress, well appearing and well nourished  Eyes   Conjunctiva and lids: No swelling, erythema or discharge  Ears, Nose, Mouth, and Throat   External inspection of ears and nose: Normal     Oropharynx: Normal with no erythema, edema, exudate or lesions  Pulmonary   Auscultation of lungs: Clear to auscultation  Cardiovascular   Auscultation of heart: Normal rate and rhythm, normal S1 and S2, without murmurs  Examination of extremities for edema and/or varicosities: Normal     Abdomen   Abdomen: Non-tender, no masses  Liver and spleen: No hepatomegaly or splenomegaly  Lymphatic   Palpation of lymph nodes in neck: No lymphadenopathy  Musculoskeletal   Gait and station: Normal     Skin   Skin and subcutaneous tissue: Normal without rashes or lesions  Neurologic   Cranial nerves: Cranial nerves 2-12 intact      Psychiatric   Orientation to person, place, and time: Normal          Future Appointments    Date/Time Provider Specialty Site   12/22/2016 09:45 AM Pretty Pizano MD Vascular Surgery [facility]  [place]   02/01/2017 08:40 AM Karuna Arroyo MD Pulmonary Medicine [place] [address]     Signatures   Electronically signed by : ALEJANDRA Vo ,DO; Dec 20 2016  4:52PM EST                       (Author)

## 2018-01-15 NOTE — RESULT NOTES
Verified Results  (1) AFB CULTURE (MYCOBACTERIA) 02Brg5457 12:43PM [facility]     Test Name Result Flag Reference   CLINICAL REPORT (Report)     Test:        AFB Culture with Stain  Specimen Source:  Lung, Right Upper Lobe Bronchial Lavage  Specimen Type:   Lavage  Result Date:    6/20/2017 12:43 PM  Result Status:   Final result  Resulting Lab:   [facility] [address] [address]            Tel: [phone]      CULTURE                                       ------------------                                   No AFB Isolated at 6 Weeks      STAIN                                        ------------------                                   No acid fast bacilli seen     (1) AFB CULTURE (MYCOBACTERIA) 82VQM6389 01:40PM [facility]     Test Name Result Flag Reference   CLINICAL REPORT (Report)     Test:        AFB Culture with Stain  Specimen Source:  Lung, Right Lower Lobe Bronchial Lavage  Specimen Type:   Lavage  Specimen Date:   5/3/2017 1:40 PM  Result Date:    6/20/2017 12:43 PM  Result Status:   Final result  Resulting Lab:   [facility] [address] [place] [address]            Tel: [phone]      CULTURE                                       ------------------                                   No AFB Isolated at 6 Weeks      STAIN                                        ------------------                                   No acid fast bacilli seen

## 2018-01-16 NOTE — RESULT NOTES
Verified Results  CTA ABDOMEN PELVIS W WO CONTRAST [id number] 05:49PM Isabella Lieberman     Test Name Result Flag Reference   CTA ABDOMEN PELVIS W WO CONTRAST (Report)     CT ANGIOGRAM OF THE ABDOMEN AND PELVIS WITH AND WITHOUT IV CONTRAST     INDICATION: Aortic aneurysm     COMPARISON: PET scan August 9, 2016     TECHNIQUE: CT angiogram examination of the abdomen and pelvis was performed according to standard protocol  This examination, like all CT scans performed in the [facility], was performed utilizing techniques to minimize radiation dose    exposure, including the use of iterative reconstruction and automated exposure control  Contrast as well as noncontrast images were obtained  100 ml of Omnipaque 350 was injected intravenously  3D reconstructions were performed an independent workstation, and are supplied for review  FINDINGS:     VASCULAR STRUCTURES: There is a large fusiform aneurysm of the infrarenal aorta measuring 70 x 68 mm  There is a large amount of mural thrombus with laminated calcification  The aneurysm neck measures 20 mm in diameter and is fairly angulated  The    length of the neck measures slightly greater than 20 mm  The aneurysm extends to the aortic bifurcation  The common iliac arteries are densely calcified bilaterally without focal stenosis seen  The distal left common iliac artery is relatively small    measuring 7 mm  External iliac arteries measure between 6 and 7 mm bilaterally  There is mild common femoral disease bilaterally without significant stenosis  The celiac artery and superior mesenteric artery are patent  There is a replaced common artery  There are solitary renal arteries are seen bilaterally  There is a significant stenosis of the proximal left renal artery which is the lower renal artery    with diffuse renal atrophy       OTHER FINDINGS     ABDOMEN     LOWER CHEST: There is a known mass in the right lower lobedd     LIVER/BILIARY TREE: Unremarkable  GALLBLADDER: No calcified gallstones  No pericholecystic inflammatory change  SPLEEN: Unremarkable  Normal size  PANCREAS: Unremarkable  ADRENAL GLANDS: Unremarkable  KIDNEYS/URETERS: No solid renal mass  No hydronephrosis  No urinary tract calculi  PELVIS     REPRODUCTIVE ORGANS: Unremarkable for patient's age  URINARY BLADDER: Unremarkable  ADDITIONAL ABDOMINAL AND PELVIC STRUCTURES     STOMACH AND BOWEL: Unremarkable  ABDOMINOPELVIC CAVITY: No pathologically enlarged mesenteric or retroperitoneal lymph nodes  No ascites or free intraperitoneal air  ABDOMINAL WALL/INGUINAL REGIONS: Unremarkable  OSSEOUS STRUCTURES: No acute fracture or destructive osseous lesion  IMPRESSION:   1  7 cm fusiform aneurysm of the infrarenal aorta  Aneurysm morphology appears amenable to endovascular repair  The neck is of sufficient length but angulated  The common iliac arteries are relatively small but without focal stenosis measuring 7-8 mm   in diameter  2  High-grade focal proximal left renal artery stenosis with left renal atrophy   3   Known large right lower lobe lung mass       Workstation performed: [id number]     Signed by:   Rickey Seymour MD   10/4/16   100

## 2018-01-17 NOTE — MISCELLANEOUS
Message  I called the patient and her  picked up the phone  I left a message for him regarding the plan for repair of aneurysm and necessity for stress testing  We will all her back later tonight when she returns home  Signatures   Electronically signed by :  Nitin Harman MD; Oct 11 2016  6:10PM EST                       (Author)

## 2018-01-17 NOTE — MISCELLANEOUS
History of Present Illness  COPD Hospital Discharge Initial Follow-Up Call: The patient is being contacted for follow-up after hospitalization  The date of discharge is 6/23/2017  I spoke with nurse Sarah Goodman @ [facility]   Patient was identified as high risk for readmission per risk stratification  The patient was discharged to [facility]  The Gold Stage of COPD is severe  The patient is a former smoker with a 70 pack year history  The patient quit smoking in 2007  The patient is experiencing the following symptoms: cough and dyspnea, but no wheezing and no fever  Pulse oximetry mid 90's % and with oxygen at 4 LPM    Zone tool status is yellow  The following topics were reviewed:  rescue vs  maintenance inhalers  Narrative Summary:  Nurse reports patient very weak, deconditioned likely due to extended hospitalization  PT to begin today, as tolerated  Taking MS IR Q 6 prn ATC for continued back pain  Care team to consult ADM team (advanced disease management) to discuss goals of care/next steps in upcoming days  Pulmonary assessment stable today, per nurse  Current Meds    1  Sulfamethoxazole-Trimethoprim 800-160 MG Oral Tablet (Bactrim DS); Take one tablet   every Monday, Wednesday, and Friday; Therapy: 89NSA5268 to (Evaluate:06Oct2017)  Requested for: 97ZQD4382; Last   Rx:19May2017 Ordered   2  Symbicort 160-4 5 MCG/ACT Inhalation Aerosol; INHALE TWO PUFFS BY MOUTH TWICE   DAILY  RINSE MOUTH AFTER USE  Requested for: 16PAX3500; Last Rx:09Mar2017   Ordered    3  Sulfamethoxazole-Trimethoprim 800-160 MG Oral Tablet (Bactrim DS); TAKE 1 TABLET   DAILY ON MONDAY, WEDNESDAY, AND FRIDAY; Therapy: 94ZFH9513 to (Last Rx:31May2017) Ordered    4  Calcium + D TABS; TAKE 1 TABLET DAILY; Therapy: (Recorded:07Oct2016) to Recorded   5  Coconut Oil 1000 MG Oral Capsule; TAKE CAPSULE Daily; Therapy: (Recorded:07Oct2016) to Recorded   6   [employer]S Vitamin D CAPS; TAKE CAPSULE Daily; Therapy: (Recorded:07Oct2016) to Recorded   7  Flaxseed Oil 1200 MG Oral Capsule; TAKE AS DIRECTED; Therapy: (Recorded:07Oct2016) to Recorded   8  Krill Oil CAPS; one capsule daily; Therapy: (Recorded:02May2017) to Recorded   9  Ocuvite TABS; take 2 tablet daily; Therapy: (Recorded:07Oct2016) to Recorded   10  One-Daily Multi Vitamins TABS; TAKE 1 TABLET DAILY; Therapy: (Recorded:07Oct2016) to Recorded   11  PredniSONE 20 MG Oral Tablet; TAKE 3 TABLETS DAILY FOR 5 DAYS THEN, TAKE 2    TABLETS DAILY FOR 5 DAYS THEN, TAKE 1 TABLET DAILY FOR 5 DAYS; Therapy: (Recorded:02May2017) to Recorded   12  ProAir  (90 Base) MCG/ACT Inhalation Aerosol Solution; INHALE 1 TO 2 PUFFS    EVERY 4 TO 6 HOURS AS NEEDED Recorded   13  Turmeric 450 MG Oral Capsule; TAKE CAPSULE Daily; Therapy: (Recorded:02May2017) to Recorded   14  Zegerid  MG Oral Capsule (Omeprazole-Sodium Bicarbonate); TAKE 1    CAPSULE TWICE DAILY; Therapy: (Recorded:02May2017) to Recorded   15  ZyrTEC Allergy 10 MG Oral Tablet; TAKE 1 TABLET DAILY AS DIRECTED; Therapy: (Recorded:02May2017) to Recorded    Allergies    1  Codeine Derivatives   2  Taxol CONC    3   Seasonal    Future Appointments    Date/Time Provider Specialty Site   07/25/2017 02:00 PM Sandra Yen MD Pulmonary Medicine [facility]     Signatures   Electronically signed by : Mamta Harrell, ; Jun 26 2017 11:52AM EST                       (Author)

## 2018-01-17 NOTE — MISCELLANEOUS
History of Present Illness  COPD Hospital Discharge Initial Follow-Up Call: The patient is being contacted for follow-up after hospitalization  The date of discharge is 8/10/16  I spoke with patient  Patient was identified as medium risk for readmission per risk stratification  The patient was discharged to home  The patient is a former smoker with a pack year history  The patient quit smoking in 2016  The following topics were reviewed:  rescue vs  maintenance inhalers, DME [employer], energy conservation, physician follow-ups and medication compliance  Narrative Summary:    Spoke with patient after discharge a week ago  Missed as a COPD admission due to comorbidities  Patient called to schedule her PET Scan (Aug 30) and will call today to schedule PFT and follow-up with Pulmonary  She was in the hospital in March this year with Pneumonia, after which she was to follow-up with Dr Barkley Alu  No appts had been made to date  At this time, she is breathing without difficulty, using her inhaler if needed and taking Symbicort as ordered  Discussed activity limitations and importance of follow-up care  COPD Care Team # given to assist if needed  Current Meds    1  ZyrTEC Allergy 10 MG Oral Tablet; Therapy: (Recorded:27Vlp8273) to Recorded    Allergies    1  Codeine Derivatives    2  Seasonal    Future Appointments    Date/Time Provider Specialty Site   09/16/2016 09:40 AM Jonas Colindres MD Pulmonary Medicine [facility]     Signatures   Electronically signed by :  RT Elias; Aug 17 2016  1:46PM EST                       (Author)

## 2018-01-17 NOTE — RESULT NOTES
Message   Please advise patient on oral hydration  8 glasses of water each day on day before, day of and day after study  Verified Results  (1) BASIC METABOLIC PROFILE 91CPB9448 09:31AM Maia Mcgee    Order Number: [id number]     Test Name Result Flag Reference   GLUCOSE,RANDM 133 mg/dL     If the patient is fasting, the ADA then defines impaired fasting glucose as > 100 mg/dL and diabetes as > or equal to 123 mg/dL  SODIUM 141 mmol/L  136-145   POTASSIUM 4 1 mmol/L  3 5-5 3   CHLORIDE 104 mmol/L  100-108   CARBON DIOXIDE 28 mmol/L  21-32   ANION GAP (CALC) 9 mmol/L  4-13   BLOOD UREA NITROGEN 11 mg/dL  5-25   CREATININE 1 22 mg/dL  0 60-1 30   Standardized to IDMS reference method   CALCIUM 9 4 mg/dL  8 3-10 1   eGFR Non-African American 44 4 ml/min/1 73sq m     [facility] Energy Disease Education Program recommendations are as follows:  GFR calculation is accurate only with a steady state creatinine  Chronic Kidney disease less than 60 ml/min/1 73 sq  meters  Kidney failure less than 15 ml/min/1 73 sq  meters

## 2018-01-18 NOTE — RESULT NOTES
Verified Results  (1) FUNGUS - YEAST CULTURE [id number] 09:57AM [facility]     Test Name Result Flag Reference   CLINICAL REPORT (Report)     Test:        Fungal culture  Specimen Source:  Lung, Right Upper Lobe Bronchial Lavage  Specimen Type:   Lavage  Result Date:    5/12/2017 9:57 AM  Result Status:   Final result  Resulting Lab:   [facility] [address] [facility] [address]            Tel: [phone]      CULTURE                                       ------------------                                   4+ Growth of Candida albicans     *** This organism has been edited  The previous result was Yeast species on      5/9/2017 at 1352 EDT  ***    4+ Growth of Candida glabrata     *** This organism has been edited   The previous result was Yeast species on      5/10/2017 at 1325 EDT  ***     (1) BRONCHIAL CULTURE [id number] 06:54AM [facility]     Test Name Result Flag Reference   CLINICAL REPORT (Report)     Test:        Bronchial culture and Gram stain  Specimen Source:  Lung, Right Upper Lobe  Specimen Type:   Lavage  Result Date:    5/5/2017 6:54 AM  Result Status:   Final result  Resulting Lab:   [facility] [address] [facility] [address]            Tel: [phone]      CULTURE                                       ------------------                                   2+ Growth of Mixed Respiratory Tiffany      STAIN                                        ------------------                                   Rare Polys    1+ Gram positive cocci in pairs and chains     (1) PNEUMOCYSTIS DFA SMEAR [id number] 07:34PM [facility]     Test Name Result Flag Reference   PNEUMOCYSTIS SMEAR   Direct FA negative for Pneumocystis jiroveci (carinii)   Direct FA negative for Pneumocystis jiroveci (carinii)     (1) WBC AND DIFF,FLUID [id number] 04:02PM [facility]     Test Name Result Flag Reference   WBC FLUID 67 /ul     SITE (1) WBC AND DIFF,FLUID [id number] 04:02PM Del[facility]     Test Name Result Flag Reference   NEUTROPHIL, FLUID 67 %     LYMPHS FLUID 11 %     MONOCYTES FLUID 10 %     HISTIOCYTE, FLUID 12 %     TOTAL COUNTED 100       (1) BRONCHIAL CULTURE [id number] 01:40PM [facility]     Test Name Result Flag Reference   CLINICAL REPORT (Report)     Test:        Bronchial culture and Gram stain  Specimen Source:  Lung, Right Lower Lobe Bronchial Lavage  Specimen Type:   Lavage  Specimen Date:   5/3/2017 1:40 PM  Result Date:    5/5/2017 6:55 AM  Result Status:   Final result  Resulting Lab:   [facility] [address] [facility] [address]            Tel: [phone]      CULTURE                                       ------------------                                   1+ Growth of Mixed Respiratory Tiffany      STAIN                                        ------------------                                   No Polys or Bacteria seen     (1) FUNGUS - YEAST CULTURE [id number] 01:40PM [facility]     Test Name Result Flag Reference   CLINICAL REPORT (Report)     Test:        Fungal culture  Specimen Source:  Lung, Right Lower Lobe Bronchial Lavage  Specimen Type:   Lavage  Specimen Date:   5/3/2017 1:40 PM  Result Date:    5/12/2017 9:56 AM  Result Status:   Final result  Resulting Lab:   [facility] [address] [facility] [address]            Tel: [phone]      CULTURE                                       ------------------                                   4+ Growth of Candida albicans     *** This organism has been edited  The previous result was Yeast species on      5/9/2017 at 1352 EDT  ***    4+ Growth of Candida glabrata     *** This organism has been edited   The previous result was Yeast species on      5/10/2017 at 1327 EDT  ***

## 2018-03-07 NOTE — MISCELLANEOUS
History of Present Illness  COPD Subsequent Hospital Discharge Phone Calls: The patient is being contacted for continued follow-up after hospitalization  The patient was discharged from the hospital on 8/10/16  I spoke with patient  Patient was identified as medium risk for readmission per risk stratification  The patient was discharged to home  The patient was seen by her PCP on   The patient has not been seen by her Pulmonologist since discharge from the hospital    The patient is a former smoker with a 70 pack year history  Counseling and topics reviewed:  maintenance inhaler and frequency are Symbicort, rescue inhaler and frequency are ProAir, importance of continued adherence to medication regimen and physician follow-up, DME (Oxygen, Nebulizer, Flutter valve, CPAP/BiPap) [employer] and COPD Care Number given  Narrative summary:  Patient feeling well after discharge from hospital  No problems detected at this time  Dr Lynette Valenzuela appt next week on 9/16/16  Current Meds    1  ZyrTEC Allergy 10 MG Oral Tablet; Therapy: (Recorded:[id number]) to Recorded    Allergies    1  Codeine Derivatives    2  Seasonal    Future Appointments    Date/Time Provider Specialty Site   09/29/2016 01:15 PM Luciano Burroughs MD Vascular Surgery [facility]   09/16/2016 09:40 AM Brianda Augustin MD Pulmonary Medicine [place] [address]     Signatures   Electronically signed by :  Keegan Yeung RT; Sep  9 2016  2:40PM EST                       (Author)

## 2018-03-07 NOTE — PROGRESS NOTES
Thoracic Oncology Multidisciplinary Conference: Hui Pritchard was presented today at the Thoracic Tumor Conference  The group's recommendations were: Consult with CT surgery for mediastinal staging  Consult with Vascular to evaluate abdominal aortic aneurysm and consult with ENT for hypermetabolic parotid nodule        Signatures   Electronically signed by : Arnaldo Fleischer, ; Sep 12 2016  1:47PM EST                       (Author)

## 2018-03-07 NOTE — PROGRESS NOTES
Thoracic Oncology Multidisciplinary Conference: Yasmin Cannon was discussed at Thoracic Tumor Conference today  IR biopsy recommended for tissue type followed by chemotherapy and radiation therapy        Signatures   Electronically signed by : Leslie Palacios, ; Dec 12 2016  2:44PM EST                       (Author)